# Patient Record
Sex: FEMALE | Race: WHITE | NOT HISPANIC OR LATINO | Employment: OTHER | URBAN - METROPOLITAN AREA
[De-identification: names, ages, dates, MRNs, and addresses within clinical notes are randomized per-mention and may not be internally consistent; named-entity substitution may affect disease eponyms.]

---

## 2021-08-29 ENCOUNTER — HOSPITAL ENCOUNTER (INPATIENT)
Facility: HOSPITAL | Age: 86
LOS: 2 days | Discharge: HOME HEALTH | End: 2021-08-31
Attending: EMERGENCY MEDICINE | Admitting: FAMILY MEDICINE

## 2021-08-29 ENCOUNTER — APPOINTMENT (OUTPATIENT)
Dept: GENERAL RADIOLOGY | Facility: HOSPITAL | Age: 86
End: 2021-08-29

## 2021-08-29 DIAGNOSIS — R09.02 HYPOXIA: ICD-10-CM

## 2021-08-29 DIAGNOSIS — I50.9 ACUTE ON CHRONIC CONGESTIVE HEART FAILURE, UNSPECIFIED HEART FAILURE TYPE (HCC): Primary | ICD-10-CM

## 2021-08-29 PROBLEM — I10 HTN (HYPERTENSION): Status: ACTIVE | Noted: 2021-08-29

## 2021-08-29 PROBLEM — I35.1 AORTIC VALVE REGURGITATION: Status: ACTIVE | Noted: 2018-05-13

## 2021-08-29 PROBLEM — Z95.0 CARDIAC PACEMAKER IN SITU: Status: ACTIVE | Noted: 2021-08-29

## 2021-08-29 LAB
ALBUMIN SERPL-MCNC: 3.8 G/DL (ref 3.5–5.2)
ALBUMIN/GLOB SERPL: 1.2 G/DL
ALP SERPL-CCNC: 98 U/L (ref 39–117)
ALT SERPL W P-5'-P-CCNC: 15 U/L (ref 1–33)
ANION GAP SERPL CALCULATED.3IONS-SCNC: 15 MMOL/L (ref 5–15)
AST SERPL-CCNC: 25 U/L (ref 1–32)
BASOPHILS # BLD AUTO: 0.1 10*3/MM3 (ref 0–0.2)
BASOPHILS NFR BLD AUTO: 0.9 % (ref 0–1.5)
BILIRUB SERPL-MCNC: 0.5 MG/DL (ref 0–1.2)
BUN SERPL-MCNC: 22 MG/DL (ref 8–23)
BUN/CREAT SERPL: 27.5 (ref 7–25)
CALCIUM SPEC-SCNC: 9.6 MG/DL (ref 8.6–10.5)
CHLORIDE SERPL-SCNC: 102 MMOL/L (ref 98–107)
CO2 SERPL-SCNC: 21 MMOL/L (ref 22–29)
CREAT SERPL-MCNC: 0.8 MG/DL (ref 0.57–1)
DEPRECATED RDW RBC AUTO: 57.7 FL (ref 37–54)
EOSINOPHIL # BLD AUTO: 0.03 10*3/MM3 (ref 0–0.4)
EOSINOPHIL NFR BLD AUTO: 0.3 % (ref 0.3–6.2)
ERYTHROCYTE [DISTWIDTH] IN BLOOD BY AUTOMATED COUNT: 19.7 % (ref 12.3–15.4)
FLUAV SUBTYP SPEC NAA+PROBE: NOT DETECTED
FLUBV RNA ISLT QL NAA+PROBE: NOT DETECTED
GFR SERPL CREATININE-BSD FRML MDRD: 68 ML/MIN/1.73
GLOBULIN UR ELPH-MCNC: 3.3 GM/DL
GLUCOSE SERPL-MCNC: 133 MG/DL (ref 65–99)
HCT VFR BLD AUTO: 38.4 % (ref 34–46.6)
HGB BLD-MCNC: 12 G/DL (ref 12–15.9)
HOLD SPECIMEN: NORMAL
IMM GRANULOCYTES # BLD AUTO: 0.07 10*3/MM3 (ref 0–0.05)
IMM GRANULOCYTES NFR BLD AUTO: 0.7 % (ref 0–0.5)
LYMPHOCYTES # BLD AUTO: 1.24 10*3/MM3 (ref 0.7–3.1)
LYMPHOCYTES NFR BLD AUTO: 11.5 % (ref 19.6–45.3)
MAGNESIUM SERPL-MCNC: 2 MG/DL (ref 1.6–2.4)
MCH RBC QN AUTO: 25.6 PG (ref 26.6–33)
MCHC RBC AUTO-ENTMCNC: 31.3 G/DL (ref 31.5–35.7)
MCV RBC AUTO: 82.1 FL (ref 79–97)
MONOCYTES # BLD AUTO: 0.58 10*3/MM3 (ref 0.1–0.9)
MONOCYTES NFR BLD AUTO: 5.4 % (ref 5–12)
NEUTROPHILS NFR BLD AUTO: 8.72 10*3/MM3 (ref 1.7–7)
NEUTROPHILS NFR BLD AUTO: 81.2 % (ref 42.7–76)
NRBC BLD AUTO-RTO: 0 /100 WBC (ref 0–0.2)
NT-PROBNP SERPL-MCNC: ABNORMAL PG/ML (ref 0–1800)
PLATELET # BLD AUTO: 332 10*3/MM3 (ref 140–450)
PMV BLD AUTO: 12.3 FL (ref 6–12)
POTASSIUM SERPL-SCNC: 4.2 MMOL/L (ref 3.5–5.2)
PROT SERPL-MCNC: 7.1 G/DL (ref 6–8.5)
RBC # BLD AUTO: 4.68 10*6/MM3 (ref 3.77–5.28)
SARS-COV-2 RNA PNL SPEC NAA+PROBE: NOT DETECTED
SODIUM SERPL-SCNC: 138 MMOL/L (ref 136–145)
T4 FREE SERPL-MCNC: 1.07 NG/DL (ref 0.93–1.7)
TROPONIN T SERPL-MCNC: 0.02 NG/ML (ref 0–0.03)
TROPONIN T SERPL-MCNC: 0.02 NG/ML (ref 0–0.03)
TSH SERPL DL<=0.05 MIU/L-ACNC: 17.88 UIU/ML (ref 0.27–4.2)
WBC # BLD AUTO: 10.74 10*3/MM3 (ref 3.4–10.8)
WHOLE BLOOD HOLD SPECIMEN: NORMAL
WHOLE BLOOD HOLD SPECIMEN: NORMAL

## 2021-08-29 PROCEDURE — 25010000002 HEPARIN (PORCINE) PER 1000 UNITS: Performed by: NURSE PRACTITIONER

## 2021-08-29 PROCEDURE — 83735 ASSAY OF MAGNESIUM: CPT | Performed by: NURSE PRACTITIONER

## 2021-08-29 PROCEDURE — 85025 COMPLETE CBC W/AUTO DIFF WBC: CPT | Performed by: EMERGENCY MEDICINE

## 2021-08-29 PROCEDURE — 84484 ASSAY OF TROPONIN QUANT: CPT | Performed by: NURSE PRACTITIONER

## 2021-08-29 PROCEDURE — 99223 1ST HOSP IP/OBS HIGH 75: CPT | Performed by: INTERNAL MEDICINE

## 2021-08-29 PROCEDURE — 25010000002 FUROSEMIDE PER 20 MG: Performed by: NURSE PRACTITIONER

## 2021-08-29 PROCEDURE — 25010000002 FUROSEMIDE PER 20 MG: Performed by: EMERGENCY MEDICINE

## 2021-08-29 PROCEDURE — 84484 ASSAY OF TROPONIN QUANT: CPT | Performed by: EMERGENCY MEDICINE

## 2021-08-29 PROCEDURE — 83880 ASSAY OF NATRIURETIC PEPTIDE: CPT | Performed by: EMERGENCY MEDICINE

## 2021-08-29 PROCEDURE — 25010000002 FUROSEMIDE PER 20 MG: Performed by: FAMILY MEDICINE

## 2021-08-29 PROCEDURE — 99284 EMERGENCY DEPT VISIT MOD MDM: CPT

## 2021-08-29 PROCEDURE — 71045 X-RAY EXAM CHEST 1 VIEW: CPT

## 2021-08-29 PROCEDURE — 93005 ELECTROCARDIOGRAM TRACING: CPT | Performed by: EMERGENCY MEDICINE

## 2021-08-29 PROCEDURE — 80053 COMPREHEN METABOLIC PANEL: CPT | Performed by: EMERGENCY MEDICINE

## 2021-08-29 PROCEDURE — 84439 ASSAY OF FREE THYROXINE: CPT | Performed by: FAMILY MEDICINE

## 2021-08-29 PROCEDURE — 84443 ASSAY THYROID STIM HORMONE: CPT | Performed by: NURSE PRACTITIONER

## 2021-08-29 PROCEDURE — 87636 SARSCOV2 & INF A&B AMP PRB: CPT | Performed by: EMERGENCY MEDICINE

## 2021-08-29 RX ORDER — ERGOCALCIFEROL 1.25 MG/1
50000 CAPSULE ORAL WEEKLY
COMMUNITY

## 2021-08-29 RX ORDER — FUROSEMIDE 10 MG/ML
40 INJECTION INTRAMUSCULAR; INTRAVENOUS EVERY 12 HOURS
Status: COMPLETED | OUTPATIENT
Start: 2021-08-29 | End: 2021-08-30

## 2021-08-29 RX ORDER — SODIUM CHLORIDE 0.9 % (FLUSH) 0.9 %
10 SYRINGE (ML) INJECTION EVERY 12 HOURS SCHEDULED
Status: DISCONTINUED | OUTPATIENT
Start: 2021-08-29 | End: 2021-08-31 | Stop reason: HOSPADM

## 2021-08-29 RX ORDER — AMOXICILLIN 250 MG
2 CAPSULE ORAL 2 TIMES DAILY
Status: DISCONTINUED | OUTPATIENT
Start: 2021-08-29 | End: 2021-08-31 | Stop reason: HOSPADM

## 2021-08-29 RX ORDER — POLYETHYLENE GLYCOL 3350 17 G/17G
17 POWDER, FOR SOLUTION ORAL DAILY
Status: DISCONTINUED | OUTPATIENT
Start: 2021-08-29 | End: 2021-08-31 | Stop reason: HOSPADM

## 2021-08-29 RX ORDER — POTASSIUM CHLORIDE 1.5 G/1.77G
40 POWDER, FOR SOLUTION ORAL AS NEEDED
Status: DISCONTINUED | OUTPATIENT
Start: 2021-08-29 | End: 2021-08-31 | Stop reason: HOSPADM

## 2021-08-29 RX ORDER — HEPARIN SODIUM 5000 [USP'U]/ML
5000 INJECTION, SOLUTION INTRAVENOUS; SUBCUTANEOUS EVERY 12 HOURS SCHEDULED
Status: DISCONTINUED | OUTPATIENT
Start: 2021-08-29 | End: 2021-08-31 | Stop reason: HOSPADM

## 2021-08-29 RX ORDER — FUROSEMIDE 10 MG/ML
40 INJECTION INTRAMUSCULAR; INTRAVENOUS ONCE
Status: COMPLETED | OUTPATIENT
Start: 2021-08-29 | End: 2021-08-29

## 2021-08-29 RX ORDER — AMLODIPINE BESYLATE 10 MG/1
10 TABLET ORAL DAILY
COMMUNITY
End: 2021-08-31 | Stop reason: HOSPADM

## 2021-08-29 RX ORDER — SODIUM CHLORIDE 0.9 % (FLUSH) 0.9 %
10 SYRINGE (ML) INJECTION AS NEEDED
Status: DISCONTINUED | OUTPATIENT
Start: 2021-08-29 | End: 2021-08-31 | Stop reason: HOSPADM

## 2021-08-29 RX ORDER — FUROSEMIDE 10 MG/ML
40 INJECTION INTRAMUSCULAR; INTRAVENOUS DAILY
Status: COMPLETED | OUTPATIENT
Start: 2021-08-29 | End: 2021-08-29

## 2021-08-29 RX ORDER — MAGNESIUM SULFATE HEPTAHYDRATE 40 MG/ML
4 INJECTION, SOLUTION INTRAVENOUS AS NEEDED
Status: DISCONTINUED | OUTPATIENT
Start: 2021-08-29 | End: 2021-08-31 | Stop reason: HOSPADM

## 2021-08-29 RX ORDER — FLUTICASONE PROPIONATE 50 MCG
2 SPRAY, SUSPENSION (ML) NASAL DAILY
COMMUNITY
End: 2021-10-14

## 2021-08-29 RX ORDER — MAGNESIUM SULFATE HEPTAHYDRATE 40 MG/ML
2 INJECTION, SOLUTION INTRAVENOUS AS NEEDED
Status: DISCONTINUED | OUTPATIENT
Start: 2021-08-29 | End: 2021-08-31 | Stop reason: HOSPADM

## 2021-08-29 RX ORDER — LEVOTHYROXINE SODIUM 88 UG/1
88 TABLET ORAL
Status: DISCONTINUED | OUTPATIENT
Start: 2021-08-29 | End: 2021-08-31 | Stop reason: HOSPADM

## 2021-08-29 RX ORDER — ROSUVASTATIN CALCIUM 10 MG/1
10 TABLET, COATED ORAL DAILY
COMMUNITY

## 2021-08-29 RX ORDER — NITROGLYCERIN 20 MG/100ML
5-50 INJECTION INTRAVENOUS
Status: DISCONTINUED | OUTPATIENT
Start: 2021-08-29 | End: 2021-08-31 | Stop reason: HOSPADM

## 2021-08-29 RX ORDER — ROSUVASTATIN CALCIUM 10 MG/1
10 TABLET, COATED ORAL NIGHTLY
Status: DISCONTINUED | OUTPATIENT
Start: 2021-08-29 | End: 2021-08-31 | Stop reason: HOSPADM

## 2021-08-29 RX ORDER — POTASSIUM CHLORIDE 7.45 MG/ML
10 INJECTION INTRAVENOUS
Status: DISCONTINUED | OUTPATIENT
Start: 2021-08-29 | End: 2021-08-31 | Stop reason: HOSPADM

## 2021-08-29 RX ORDER — POTASSIUM CHLORIDE 750 MG/1
40 CAPSULE, EXTENDED RELEASE ORAL AS NEEDED
Status: DISCONTINUED | OUTPATIENT
Start: 2021-08-29 | End: 2021-08-31 | Stop reason: HOSPADM

## 2021-08-29 RX ORDER — LEVOTHYROXINE SODIUM 88 UG/1
88 TABLET ORAL DAILY
COMMUNITY

## 2021-08-29 RX ADMIN — FUROSEMIDE 40 MG: 10 INJECTION, SOLUTION INTRAMUSCULAR; INTRAVENOUS at 07:53

## 2021-08-29 RX ADMIN — DOCUSATE SODIUM 50 MG AND SENNOSIDES 8.6 MG 2 TABLET: 8.6; 5 TABLET, FILM COATED ORAL at 18:55

## 2021-08-29 RX ADMIN — SODIUM CHLORIDE, PRESERVATIVE FREE 10 ML: 5 INJECTION INTRAVENOUS at 07:54

## 2021-08-29 RX ADMIN — SODIUM CHLORIDE, PRESERVATIVE FREE 10 ML: 5 INJECTION INTRAVENOUS at 20:11

## 2021-08-29 RX ADMIN — HEPARIN SODIUM 5000 UNITS: 5000 INJECTION, SOLUTION INTRAVENOUS; SUBCUTANEOUS at 07:54

## 2021-08-29 RX ADMIN — HEPARIN SODIUM 5000 UNITS: 5000 INJECTION, SOLUTION INTRAVENOUS; SUBCUTANEOUS at 20:11

## 2021-08-29 RX ADMIN — POLYETHYLENE GLYCOL 3350 17 G: 17 POWDER, FOR SOLUTION ORAL at 18:55

## 2021-08-29 RX ADMIN — FUROSEMIDE 40 MG: 10 INJECTION, SOLUTION INTRAMUSCULAR; INTRAVENOUS at 17:19

## 2021-08-29 RX ADMIN — FUROSEMIDE 40 MG: 10 INJECTION, SOLUTION INTRAMUSCULAR; INTRAVENOUS at 03:45

## 2021-08-29 RX ADMIN — LEVOTHYROXINE SODIUM 88 MCG: 88 TABLET ORAL at 15:30

## 2021-08-29 RX ADMIN — ROSUVASTATIN CALCIUM 10 MG: 10 TABLET, COATED ORAL at 20:11

## 2021-08-30 ENCOUNTER — APPOINTMENT (OUTPATIENT)
Dept: CARDIOLOGY | Facility: HOSPITAL | Age: 86
End: 2021-08-30

## 2021-08-30 PROBLEM — I50.43 ACUTE ON CHRONIC COMBINED SYSTOLIC AND DIASTOLIC CONGESTIVE HEART FAILURE: Status: ACTIVE | Noted: 2021-08-29

## 2021-08-30 LAB
ANION GAP SERPL CALCULATED.3IONS-SCNC: 9 MMOL/L (ref 5–15)
ASCENDING AORTA: 2.9 CM
AV AREA-PISA: 0.43 CM2
AV RADIUS PISA: 1 CM
AV REGURGITANT VOLUME: 57 CC
AV VENA CONTRACTA: 0.85 CM
BH CV ECHO MEAS - AI DEC SLOPE: 747.7 CM/SEC^2
BH CV ECHO MEAS - AI MAX PG: 80.3 MMHG
BH CV ECHO MEAS - AI MAX VEL: 448 CM/SEC
BH CV ECHO MEAS - AI P1/2T: 175.5 MSEC
BH CV ECHO MEAS - AO MAX PG (FULL): 4.1 MMHG
BH CV ECHO MEAS - AO MAX PG: 7 MMHG
BH CV ECHO MEAS - AO MEAN PG (FULL): 3 MMHG
BH CV ECHO MEAS - AO MEAN PG: 4 MMHG
BH CV ECHO MEAS - AO ROOT AREA (BSA CORRECTED): 2.3
BH CV ECHO MEAS - AO ROOT AREA: 7.1 CM^2
BH CV ECHO MEAS - AO ROOT DIAM: 3 CM
BH CV ECHO MEAS - AO V2 MAX: 131 CM/SEC
BH CV ECHO MEAS - AO V2 MEAN: 88.5 CM/SEC
BH CV ECHO MEAS - AO V2 VTI: 24.2 CM
BH CV ECHO MEAS - ASC AORTA: 2.9 CM
BH CV ECHO MEAS - AVA(I,A): 1.8 CM^2
BH CV ECHO MEAS - AVA(I,D): 1.8 CM^2
BH CV ECHO MEAS - AVA(V,A): 2 CM^2
BH CV ECHO MEAS - AVA(V,D): 2 CM^2
BH CV ECHO MEAS - BSA(HAYCOCK): 1.3 M^2
BH CV ECHO MEAS - BSA: 1.3 M^2
BH CV ECHO MEAS - BZI_BMI: 17 KILOGRAMS/M^2
BH CV ECHO MEAS - BZI_METRIC_HEIGHT: 152.4 CM
BH CV ECHO MEAS - BZI_METRIC_WEIGHT: 39.5 KG
BH CV ECHO MEAS - EDV(CUBED): 117.6 ML
BH CV ECHO MEAS - EDV(MOD-SP2): 113 ML
BH CV ECHO MEAS - EDV(MOD-SP4): 91.6 ML
BH CV ECHO MEAS - EDV(TEICH): 112.8 ML
BH CV ECHO MEAS - EF(CUBED): 49.6 %
BH CV ECHO MEAS - EF(MOD-BP): 34.8 %
BH CV ECHO MEAS - EF(MOD-SP2): 35.3 %
BH CV ECHO MEAS - EF(MOD-SP4): 31.2 %
BH CV ECHO MEAS - EF(TEICH): 41.6 %
BH CV ECHO MEAS - ESV(CUBED): 59.3 ML
BH CV ECHO MEAS - ESV(MOD-SP2): 73.1 ML
BH CV ECHO MEAS - ESV(MOD-SP4): 63 ML
BH CV ECHO MEAS - ESV(TEICH): 65.9 ML
BH CV ECHO MEAS - FS: 20.4 %
BH CV ECHO MEAS - IVS/LVPW: 0.83
BH CV ECHO MEAS - IVSD: 1 CM
BH CV ECHO MEAS - LA DIMENSION: 2.8 CM
BH CV ECHO MEAS - LA/AO: 0.93
BH CV ECHO MEAS - LAD MAJOR: 5.5 CM
BH CV ECHO MEAS - LAT PEAK E' VEL: 6.7 CM/SEC
BH CV ECHO MEAS - LATERAL E/E' RATIO: 15
BH CV ECHO MEAS - LV DIASTOLIC VOL/BSA (35-75): 69.9 ML/M^2
BH CV ECHO MEAS - LV IVRT: 0.1 SEC
BH CV ECHO MEAS - LV MASS(C)D: 200.5 GRAMS
BH CV ECHO MEAS - LV MASS(C)DI: 153 GRAMS/M^2
BH CV ECHO MEAS - LV MAX PG: 2.9 MMHG
BH CV ECHO MEAS - LV MEAN PG: 1 MMHG
BH CV ECHO MEAS - LV SYSTOLIC VOL/BSA (12-30): 48.1 ML/M^2
BH CV ECHO MEAS - LV V1 MAX: 84.6 CM/SEC
BH CV ECHO MEAS - LV V1 MEAN: 50.1 CM/SEC
BH CV ECHO MEAS - LV V1 VTI: 13.7 CM
BH CV ECHO MEAS - LVIDD: 4.9 CM
BH CV ECHO MEAS - LVIDS: 3.9 CM
BH CV ECHO MEAS - LVLD AP2: 7.8 CM
BH CV ECHO MEAS - LVLD AP4: 7 CM
BH CV ECHO MEAS - LVLS AP2: 7.2 CM
BH CV ECHO MEAS - LVLS AP4: 6.8 CM
BH CV ECHO MEAS - LVOT AREA (M): 3.1 CM^2
BH CV ECHO MEAS - LVOT AREA: 3.1 CM^2
BH CV ECHO MEAS - LVOT DIAM: 2 CM
BH CV ECHO MEAS - LVPWD: 1.2 CM
BH CV ECHO MEAS - MED PEAK E' VEL: 5.6 CM/SEC
BH CV ECHO MEAS - MEDIAL E/E' RATIO: 17.8
BH CV ECHO MEAS - MR ALIAS VEL: 30.8 CM/SEC
BH CV ECHO MEAS - MR ERO: 0.13 CM^2
BH CV ECHO MEAS - MR FLOW RATE: 69.7 CM^3/SEC
BH CV ECHO MEAS - MR MAX PG: 110 MMHG
BH CV ECHO MEAS - MR MAX VEL: 524 CM/SEC
BH CV ECHO MEAS - MR MEAN PG: 71 MMHG
BH CV ECHO MEAS - MR MEAN VEL: 393 CM/SEC
BH CV ECHO MEAS - MR PISA RADIUS: 0.6 CM
BH CV ECHO MEAS - MR PISA: 2.3 CM^2
BH CV ECHO MEAS - MR VOLUME: 22.1 ML
BH CV ECHO MEAS - MR VTI: 166 CM
BH CV ECHO MEAS - MV A MAX VEL: 72.8 CM/SEC
BH CV ECHO MEAS - MV AREA (1 DIAM): 4.9 CM^2
BH CV ECHO MEAS - MV DEC SLOPE: 499.5 CM/SEC^2
BH CV ECHO MEAS - MV DEC TIME: 0.21 SEC
BH CV ECHO MEAS - MV DIAM: 2.5 CM
BH CV ECHO MEAS - MV E MAX VEL: 100 CM/SEC
BH CV ECHO MEAS - MV E/A: 1.4
BH CV ECHO MEAS - MV FLOW AREA(1DIAM): 4.9 CM^2
BH CV ECHO MEAS - MV MAX PG: 3.2 MMHG
BH CV ECHO MEAS - MV MEAN PG: 2 MMHG
BH CV ECHO MEAS - MV P1/2T MAX VEL: 93.1 CM/SEC
BH CV ECHO MEAS - MV P1/2T: 54.6 MSEC
BH CV ECHO MEAS - MV V2 MAX: 89.1 CM/SEC
BH CV ECHO MEAS - MV V2 MEAN: 73.1 CM/SEC
BH CV ECHO MEAS - MV V2 VTI: 16.1 CM
BH CV ECHO MEAS - MVA P1/2T LCG: 2.4 CM^2
BH CV ECHO MEAS - MVA(P1/2T): 4 CM^2
BH CV ECHO MEAS - MVA(VTI): 2.7 CM^2
BH CV ECHO MEAS - PA ACC TIME: 0.16 SEC
BH CV ECHO MEAS - PA MAX PG: 4.6 MMHG
BH CV ECHO MEAS - PA PR(ACCEL): 6.1 MMHG
BH CV ECHO MEAS - PA V2 MAX: 107 CM/SEC
BH CV ECHO MEAS - PI END-D VEL: 130 CM/SEC
BH CV ECHO MEAS - RAP SYSTOLE: 3 MMHG
BH CV ECHO MEAS - RF(MV,AO)(1 DIAM): -1.2
BH CV ECHO MEAS - RF(MV,LVOT)(1DIAM): 0.46
BH CV ECHO MEAS - RVSP: 25 MMHG
BH CV ECHO MEAS - SI(AO): 130.5 ML/M^2
BH CV ECHO MEAS - SI(CUBED): 44.5 ML/M^2
BH CV ECHO MEAS - SI(LVOT): 32.8 ML/M^2
BH CV ECHO MEAS - SI(MOD-SP2): 30.4 ML/M^2
BH CV ECHO MEAS - SI(MOD-SP4): 21.8 ML/M^2
BH CV ECHO MEAS - SI(MV 1 DIAM): 60.3 ML/M^2
BH CV ECHO MEAS - SI(TEICH): 35.8 ML/M^2
BH CV ECHO MEAS - SV(AO): 171.1 ML
BH CV ECHO MEAS - SV(CUBED): 58.3 ML
BH CV ECHO MEAS - SV(LVOT): 43 ML
BH CV ECHO MEAS - SV(MOD-SP2): 39.9 ML
BH CV ECHO MEAS - SV(MOD-SP4): 28.6 ML
BH CV ECHO MEAS - SV(MV 1 DIAM): 79 ML
BH CV ECHO MEAS - SV(TEICH): 46.9 ML
BH CV ECHO MEAS - TAPSE (>1.6): 1.6 CM
BH CV ECHO MEAS - TR MAX PG: 22 MMHG
BH CV ECHO MEAS - TR MAX VEL: 236 CM/SEC
BH CV ECHO MEASUREMENTS AVERAGE E/E' RATIO: 16.26
BH CV FAKE - AV AREA-PISA: 0.43 CM2
BH CV VAS BP LEFT ARM: NORMAL MMHG
BH CV XLRA - RV BASE: 3.2 CM
BH CV XLRA - RV LENGTH: 5 CM
BH CV XLRA - RV MID: 2.1 CM
BH CV XLRA - TDI S': 6.5 CM/SEC
BUN SERPL-MCNC: 27 MG/DL (ref 8–23)
BUN/CREAT SERPL: 27.8 (ref 7–25)
CALCIUM SPEC-SCNC: 8.5 MG/DL (ref 8.6–10.5)
CHLORIDE SERPL-SCNC: 102 MMOL/L (ref 98–107)
CO2 SERPL-SCNC: 29 MMOL/L (ref 22–29)
CREAT SERPL-MCNC: 0.97 MG/DL (ref 0.57–1)
GFR SERPL CREATININE-BSD FRML MDRD: 54 ML/MIN/1.73
GLUCOSE SERPL-MCNC: 110 MG/DL (ref 65–99)
IVRT: 95 MSEC
LEFT ATRIUM VOLUME INDEX: 41.2 ML/M^2
LEFT ATRIUM VOLUME: 54 ML
LV EF 2D ECHO EST: 30 %
MR PISA EROA: 0.13 CM2
MV REGURGITANT FRACTION: 28 %
MV REGURGITANT VOLUME: 22 CC
MV VENA CONTRACTA: 0.57 CM
PISA ALIASING VEL: 0.3 M/S
PISA AR ALIASING VEL: 30.8 M/S
PISA AR MAX VEL: 450 M/S
PISA RADIUS: 0.6 CM
POTASSIUM SERPL-SCNC: 3 MMOL/L (ref 3.5–5.2)
POTASSIUM SERPL-SCNC: 4.8 MMOL/L (ref 3.5–5.2)
SODIUM SERPL-SCNC: 140 MMOL/L (ref 136–145)

## 2021-08-30 PROCEDURE — 80048 BASIC METABOLIC PNL TOTAL CA: CPT | Performed by: FAMILY MEDICINE

## 2021-08-30 PROCEDURE — 93306 TTE W/DOPPLER COMPLETE: CPT

## 2021-08-30 PROCEDURE — 84132 ASSAY OF SERUM POTASSIUM: CPT | Performed by: FAMILY MEDICINE

## 2021-08-30 PROCEDURE — 25010000002 HEPARIN (PORCINE) PER 1000 UNITS: Performed by: NURSE PRACTITIONER

## 2021-08-30 PROCEDURE — 25010000002 FUROSEMIDE PER 20 MG: Performed by: FAMILY MEDICINE

## 2021-08-30 PROCEDURE — 93306 TTE W/DOPPLER COMPLETE: CPT | Performed by: INTERNAL MEDICINE

## 2021-08-30 PROCEDURE — 99233 SBSQ HOSP IP/OBS HIGH 50: CPT | Performed by: FAMILY MEDICINE

## 2021-08-30 RX ORDER — SPIRONOLACTONE 25 MG/1
50 TABLET ORAL DAILY
Status: DISCONTINUED | OUTPATIENT
Start: 2021-08-30 | End: 2021-08-31 | Stop reason: HOSPADM

## 2021-08-30 RX ORDER — IPRATROPIUM BROMIDE AND ALBUTEROL SULFATE 2.5; .5 MG/3ML; MG/3ML
3 SOLUTION RESPIRATORY (INHALATION) EVERY 4 HOURS PRN
Status: DISCONTINUED | OUTPATIENT
Start: 2021-08-30 | End: 2021-08-31 | Stop reason: HOSPADM

## 2021-08-30 RX ADMIN — HEPARIN SODIUM 5000 UNITS: 5000 INJECTION, SOLUTION INTRAVENOUS; SUBCUTANEOUS at 20:23

## 2021-08-30 RX ADMIN — POTASSIUM CHLORIDE 40 MEQ: 750 CAPSULE, EXTENDED RELEASE ORAL at 10:22

## 2021-08-30 RX ADMIN — HEPARIN SODIUM 5000 UNITS: 5000 INJECTION, SOLUTION INTRAVENOUS; SUBCUTANEOUS at 08:22

## 2021-08-30 RX ADMIN — POLYETHYLENE GLYCOL 3350 17 G: 17 POWDER, FOR SOLUTION ORAL at 08:22

## 2021-08-30 RX ADMIN — POTASSIUM CHLORIDE 40 MEQ: 750 CAPSULE, EXTENDED RELEASE ORAL at 06:31

## 2021-08-30 RX ADMIN — ROSUVASTATIN CALCIUM 10 MG: 10 TABLET, COATED ORAL at 20:23

## 2021-08-30 RX ADMIN — FUROSEMIDE 40 MG: 10 INJECTION, SOLUTION INTRAMUSCULAR; INTRAVENOUS at 19:00

## 2021-08-30 RX ADMIN — SODIUM CHLORIDE, PRESERVATIVE FREE 10 ML: 5 INJECTION INTRAVENOUS at 08:33

## 2021-08-30 RX ADMIN — POTASSIUM CHLORIDE 40 MEQ: 750 CAPSULE, EXTENDED RELEASE ORAL at 14:39

## 2021-08-30 RX ADMIN — SPIRONOLACTONE 50 MG: 25 TABLET ORAL at 12:29

## 2021-08-30 RX ADMIN — SODIUM CHLORIDE, PRESERVATIVE FREE 10 ML: 5 INJECTION INTRAVENOUS at 20:23

## 2021-08-30 RX ADMIN — DOCUSATE SODIUM 50 MG AND SENNOSIDES 8.6 MG 2 TABLET: 8.6; 5 TABLET, FILM COATED ORAL at 08:22

## 2021-08-30 RX ADMIN — FUROSEMIDE 40 MG: 10 INJECTION, SOLUTION INTRAMUSCULAR; INTRAVENOUS at 04:58

## 2021-08-30 RX ADMIN — LEVOTHYROXINE SODIUM 88 MCG: 88 TABLET ORAL at 04:58

## 2021-08-31 VITALS
TEMPERATURE: 98.1 F | DIASTOLIC BLOOD PRESSURE: 47 MMHG | SYSTOLIC BLOOD PRESSURE: 118 MMHG | RESPIRATION RATE: 16 BRPM | WEIGHT: 83.7 LBS | HEIGHT: 60 IN | OXYGEN SATURATION: 92 % | HEART RATE: 82 BPM | BODY MASS INDEX: 16.43 KG/M2

## 2021-08-31 PROBLEM — I50.43 ACUTE ON CHRONIC COMBINED SYSTOLIC AND DIASTOLIC CONGESTIVE HEART FAILURE (HCC): Status: RESOLVED | Noted: 2021-08-29 | Resolved: 2021-08-31

## 2021-08-31 PROCEDURE — 25010000002 HEPARIN (PORCINE) PER 1000 UNITS: Performed by: NURSE PRACTITIONER

## 2021-08-31 PROCEDURE — 99239 HOSP IP/OBS DSCHRG MGMT >30: CPT | Performed by: FAMILY MEDICINE

## 2021-08-31 PROCEDURE — 97161 PT EVAL LOW COMPLEX 20 MIN: CPT

## 2021-08-31 RX ORDER — METOPROLOL SUCCINATE 25 MG/1
25 TABLET, EXTENDED RELEASE ORAL DAILY
Qty: 30 TABLET | Refills: 1 | Status: SHIPPED | OUTPATIENT
Start: 2021-08-31

## 2021-08-31 RX ORDER — SPIRONOLACTONE 50 MG/1
50 TABLET, FILM COATED ORAL DAILY
Qty: 30 TABLET | Refills: 1 | Status: SHIPPED | OUTPATIENT
Start: 2021-08-31 | End: 2021-12-07 | Stop reason: SDDI

## 2021-08-31 RX ADMIN — SODIUM CHLORIDE, PRESERVATIVE FREE 10 ML: 5 INJECTION INTRAVENOUS at 09:01

## 2021-08-31 RX ADMIN — LEVOTHYROXINE SODIUM 88 MCG: 88 TABLET ORAL at 06:20

## 2021-08-31 RX ADMIN — HEPARIN SODIUM 5000 UNITS: 5000 INJECTION, SOLUTION INTRAVENOUS; SUBCUTANEOUS at 09:01

## 2021-08-31 RX ADMIN — SPIRONOLACTONE 50 MG: 25 TABLET ORAL at 09:01

## 2021-08-31 RX ADMIN — METOPROLOL TARTRATE 12.5 MG: 25 TABLET, FILM COATED ORAL at 09:01

## 2021-09-01 ENCOUNTER — DOCUMENTATION (OUTPATIENT)
Dept: CARDIAC REHAB | Facility: HOSPITAL | Age: 86
End: 2021-09-01

## 2021-09-03 ENCOUNTER — DOCUMENTATION (OUTPATIENT)
Dept: CARDIAC REHAB | Facility: HOSPITAL | Age: 86
End: 2021-09-03

## 2021-09-03 LAB
QT INTERVAL: 392 MS
QTC INTERVAL: 518 MS

## 2021-09-07 ENCOUNTER — OFFICE VISIT (OUTPATIENT)
Dept: CARDIOLOGY | Facility: HOSPITAL | Age: 86
End: 2021-09-07

## 2021-09-07 ENCOUNTER — LAB (OUTPATIENT)
Dept: LAB | Facility: HOSPITAL | Age: 86
End: 2021-09-07

## 2021-09-07 VITALS
SYSTOLIC BLOOD PRESSURE: 131 MMHG | WEIGHT: 89 LBS | TEMPERATURE: 97.4 F | OXYGEN SATURATION: 98 % | RESPIRATION RATE: 20 BRPM | BODY MASS INDEX: 17.47 KG/M2 | HEART RATE: 104 BPM | DIASTOLIC BLOOD PRESSURE: 60 MMHG | HEIGHT: 60 IN

## 2021-09-07 DIAGNOSIS — I34.0 NONRHEUMATIC MITRAL VALVE REGURGITATION: ICD-10-CM

## 2021-09-07 DIAGNOSIS — I50.20 HFREF (HEART FAILURE WITH REDUCED EJECTION FRACTION) (HCC): ICD-10-CM

## 2021-09-07 DIAGNOSIS — I50.20 HFREF (HEART FAILURE WITH REDUCED EJECTION FRACTION) (HCC): Primary | ICD-10-CM

## 2021-09-07 DIAGNOSIS — I35.1 AORTIC VALVE INSUFFICIENCY, ETIOLOGY OF CARDIAC VALVE DISEASE UNSPECIFIED: ICD-10-CM

## 2021-09-07 DIAGNOSIS — R94.31 ABNORMAL ELECTROCARDIOGRAM (ECG) (EKG): ICD-10-CM

## 2021-09-07 DIAGNOSIS — I10 ESSENTIAL HYPERTENSION: ICD-10-CM

## 2021-09-07 LAB
ANION GAP SERPL CALCULATED.3IONS-SCNC: 9 MMOL/L (ref 5–15)
BUN SERPL-MCNC: 17 MG/DL (ref 8–23)
BUN/CREAT SERPL: 19.5 (ref 7–25)
CALCIUM SPEC-SCNC: 9.5 MG/DL (ref 8.6–10.5)
CHLORIDE SERPL-SCNC: 107 MMOL/L (ref 98–107)
CO2 SERPL-SCNC: 27 MMOL/L (ref 22–29)
CREAT SERPL-MCNC: 0.87 MG/DL (ref 0.57–1)
GFR SERPL CREATININE-BSD FRML MDRD: 61 ML/MIN/1.73
GLUCOSE SERPL-MCNC: 93 MG/DL (ref 65–99)
POTASSIUM SERPL-SCNC: 4 MMOL/L (ref 3.5–5.2)
SODIUM SERPL-SCNC: 143 MMOL/L (ref 136–145)

## 2021-09-07 PROCEDURE — 80048 BASIC METABOLIC PNL TOTAL CA: CPT

## 2021-09-07 PROCEDURE — 99204 OFFICE O/P NEW MOD 45 MIN: CPT | Performed by: NURSE PRACTITIONER

## 2021-09-07 PROCEDURE — 36415 COLL VENOUS BLD VENIPUNCTURE: CPT

## 2021-09-07 RX ORDER — LISINOPRIL 2.5 MG/1
2.5 TABLET ORAL DAILY
Qty: 30 TABLET | Refills: 11 | Status: SHIPPED | OUTPATIENT
Start: 2021-09-07 | End: 2021-12-07 | Stop reason: ALTCHOICE

## 2021-09-07 NOTE — PATIENT INSTRUCTIONS
- Lab work on 7th floor    - Office will call to schedule testing    - Office will call or send message with testing results    - Cardiology will call to schedule appointment

## 2021-09-07 NOTE — PROGRESS NOTES
"Chief Complaint  Congestive Heart Failure and Establish Care    Subjective    History of Present Illness {CC  Problem List  Visit  Diagnosis   Encounters  Notes  Medications  Labs  Result Review Imaging  Media :23}     Laureen Nettles, 89 y.o. female with HTN, HLD, dual-chamber PPM, aortic valve regurgitation, hypothyroidism, and newly diagnosed HFrEF presents to [facility] and Valve clinic for Congestive Heart Failure and Establish Care.    Patient recently hospitalized to [facility] from 8/29/2021-8/31/2021 for complaints of shortness of breath.  Patient was diuresed with IV furosemide; TTE revealed LVEF equals 30-35%, moderate to severe LV systolic dysfunction.  Severe aortic valve regurgitation present, moderate to severe mitral valve regurgitation present.  Patient with newly diagnosed HFrEF diagnosed during recent hospitalization. Hospital notes indicate patient with no desire for prolonged aggressive interventions given her age.    Patient presents today feeling well overall, and improved since recent hospitalization.  Patient limited historian, POA present during clinic visit and assisted with history and details.  Patient states that her dyspnea has improved since hospital discharge.  Patient denies dyspnea, chest pain/pressure, heart racing, and syncope.  Patient does not recall any previous cardiac diagnoses.  Does not recall any previous cardiac testing.  Current primary cardiologist.      Objective     Vital Signs:   Vitals:    09/07/21 1010 09/07/21 1013 09/07/21 1015   BP: 140/63 128/58 131/60   BP Location: Right arm Left arm Left arm   Patient Position: Sitting Standing Sitting   Cuff Size: Adult Adult Adult   Pulse: 92 101 104   Resp:   20   Temp:   97.4 °F (36.3 °C)   TempSrc:   Temporal   SpO2: 97% 98% 98%   Weight:   40.4 kg (89 lb)   Height:   152.4 cm (60\")     Body mass index is 17.38 kg/m².  Physical Exam  Vitals and nursing note reviewed.   Constitutional:     "   Appearance: Normal appearance.   HENT:      Head: Normocephalic.   Eyes:      Extraocular Movements: Extraocular movements intact.   Neck:      Vascular: No carotid bruit.   Cardiovascular:      Rate and Rhythm: Normal rate and regular rhythm.      Pulses: Normal pulses.      Heart sounds: Normal heart sounds, S1 normal and S2 normal. No murmur heard.     Pulmonary:      Effort: Pulmonary effort is normal. No respiratory distress.      Breath sounds: Normal breath sounds.   Musculoskeletal:      Cervical back: Neck supple.      Right lower leg: No edema.      Left lower leg: No edema.   Skin:     General: Skin is warm and dry.   Neurological:      General: No focal deficit present.      Mental Status: She is alert.   Psychiatric:         Mood and Affect: Mood normal.         Behavior: Behavior normal.         Thought Content: Thought content normal.        Data Reviewed:{ Labs  Result Review  Imaging  Med Tab  Media :23}     Potassium (08/30/2021 17:36)  Basic Metabolic Panel (08/30/2021 03:32)  BNP (08/29/2021 01:29)  CBC & Differential (08/29/2021 01:29)  Adult Transthoracic Echo Complete W/ Cont if Necessary Per Protocol (08/30/2021 10:21)  ECG 12 Lead (08/29/2021 02:04)      Assessment and Plan {CC Problem List  Visit Diagnosis  ROS  Review (Popup)  Health Maintenance  Quality  BestPractice  Medications  SmartSets  SnapShot Encounters  Media :23}     1. HFrEF (heart failure with reduced ejection fraction) (CMS/[facility])  -Newly diagnosed HFrEF; TTE during recent hospitalization with LVEF 30-35%, moderate to severe LV systolic dysfunction.  -Appears euvolemic on exam; pulmonary exam with no evidence of pulmonary edema  -Given newly diagnosed HFrEF will pursue ischemic evaluation with myocardial perfusion study.  -Continue metoprolol, spironolactone as prescribed. Entresto a consideration in future; will initiate low-dose lisinopril and monitor response given patient's age and unknown home blood  pressures.  - Ambulatory Referral to Cardiology  - Stress Test With Myocardial Perfusion (1 Day); Future  - lisinopril (PRINIVIL,ZESTRIL) 2.5 MG tablet; Take 1 tablet by mouth Daily.  Dispense: 30 tablet; Refill: 11  - Basic Metabolic Panel; Future  -Discussed heart failure symptoms, and when to seek emergent medical care.    2.  Aortic valve regurgitation  -Severe aortic valve regurgitation noted on TTE 8/30/2021  -Denies syncopal episodes  - Ambulatory Referral to Cardiology    3.  Mitral valve regurgitation  -Moderate to severe mitral valve regurgitation noted on TTE 8/30/2021  -Referral to cardiology as above    4. Essential hypertension  -Controlled at time of visit  -Continue with metoprolol, spironolactone as prescribed  - lisinopril (PRINIVIL,ZESTRIL) 2.5 MG tablet; Take 1 tablet by mouth Daily.  Dispense: 30 tablet; Refill: 11    5. Abnormal electrocardiogram (ECG) (EKG)   -In setting of newly diagnosed HFrEF will pursue ischemic evaluation as above  - Stress Test With Myocardial Perfusion (1 Day); Future        Follow Up {Instructions Charge Capture  Follow-up Communications :23}     Return in about 4 weeks (around 10/5/2021) for Office follow-up.  Heart/fluid check .    Patient was given instructions and counseling regarding her condition or for health maintenance advice. Please see specific information pulled into the AVS if appropriate.  Patient was instructed to call the Heart [facility] Valve Center with any questions, concerns, or worsening symptoms.    *Please note that portions of this note were completed with a voice recognition program. Efforts were made to edit the dictations, but occasionally words are mistranscribed.

## 2021-09-21 ENCOUNTER — HOSPITAL ENCOUNTER (OUTPATIENT)
Dept: CARDIOLOGY | Facility: HOSPITAL | Age: 86
Discharge: HOME | End: 2021-09-21
Admitting: NURSE PRACTITIONER

## 2021-09-21 VITALS
DIASTOLIC BLOOD PRESSURE: 80 MMHG | BODY MASS INDEX: 17.47 KG/M2 | SYSTOLIC BLOOD PRESSURE: 120 MMHG | HEIGHT: 60 IN | WEIGHT: 89 LBS | HEART RATE: 100 BPM

## 2021-09-21 DIAGNOSIS — R94.31 ABNORMAL ELECTROCARDIOGRAM (ECG) (EKG): ICD-10-CM

## 2021-09-21 DIAGNOSIS — I50.20 HFREF (HEART FAILURE WITH REDUCED EJECTION FRACTION) (HCC): ICD-10-CM

## 2021-09-21 LAB
BH CV REST NUCLEAR ISOTOPE DOSE: 9.2 MCI
BH CV STRESS BP STAGE 1: NORMAL
BH CV STRESS BP STAGE 3: NORMAL
BH CV STRESS BP STAGE 4: NORMAL
BH CV STRESS COMMENTS STAGE 1: NORMAL
BH CV STRESS DOSE REGADENOSON STAGE 1: 0.4
BH CV STRESS DURATION MIN STAGE 1: 1
BH CV STRESS DURATION MIN STAGE 2: 1
BH CV STRESS DURATION MIN STAGE 3: 1
BH CV STRESS DURATION MIN STAGE 4: 1
BH CV STRESS DURATION SEC STAGE 1: 0
BH CV STRESS DURATION SEC STAGE 2: 0
BH CV STRESS DURATION SEC STAGE 3: 0
BH CV STRESS DURATION SEC STAGE 4: 0
BH CV STRESS HR STAGE 1: 108
BH CV STRESS HR STAGE 2: 110
BH CV STRESS HR STAGE 3: 111
BH CV STRESS HR STAGE 4: 112
BH CV STRESS NUCLEAR ISOTOPE DOSE: 33 MCI
BH CV STRESS O2 STAGE 1: 99
BH CV STRESS O2 STAGE 2: 97
BH CV STRESS O2 STAGE 3: 100
BH CV STRESS O2 STAGE 4: 99
BH CV STRESS PROTOCOL 1: NORMAL
BH CV STRESS RECOVERY BP: NORMAL MMHG
BH CV STRESS RECOVERY HR: 107 BPM
BH CV STRESS RECOVERY O2: 98 %
BH CV STRESS STAGE 1: 1
BH CV STRESS STAGE 2: 2
BH CV STRESS STAGE 3: 3
BH CV STRESS STAGE 4: 4
LV EF NUC BP: 36 %
MAXIMAL PREDICTED HEART RATE: 131 BPM
PERCENT MAX PREDICTED HR: 87.02 %
STRESS BASELINE BP: NORMAL MMHG
STRESS BASELINE HR: 100 BPM
STRESS O2 SAT REST: 96 %
STRESS PERCENT HR: 102 %
STRESS POST ESTIMATED WORKLOAD: 1 METS
STRESS POST EXERCISE DUR MIN: 4 MIN
STRESS POST EXERCISE DUR SEC: 0 SEC
STRESS POST O2 SAT PEAK: 100 %
STRESS POST PEAK BP: NORMAL MMHG
STRESS POST PEAK HR: 114 BPM
STRESS TARGET HR: 111 BPM

## 2021-09-21 PROCEDURE — 93018 CV STRESS TEST I&R ONLY: CPT | Performed by: INTERNAL MEDICINE

## 2021-09-21 PROCEDURE — 25010000002 REGADENOSON 0.4 MG/5ML SOLUTION: Performed by: NURSE PRACTITIONER

## 2021-09-21 PROCEDURE — 78452 HT MUSCLE IMAGE SPECT MULT: CPT

## 2021-09-21 PROCEDURE — 0 TECHNETIUM SESTAMIBI: Performed by: NURSE PRACTITIONER

## 2021-09-21 PROCEDURE — A9500 TC99M SESTAMIBI: HCPCS | Performed by: NURSE PRACTITIONER

## 2021-09-21 PROCEDURE — 78452 HT MUSCLE IMAGE SPECT MULT: CPT | Performed by: INTERNAL MEDICINE

## 2021-09-21 PROCEDURE — 93017 CV STRESS TEST TRACING ONLY: CPT

## 2021-09-21 RX ORDER — CAFFEINE CITRATE 20 MG/ML
60 SOLUTION INTRAVENOUS ONCE
Status: COMPLETED | OUTPATIENT
Start: 2021-09-21 | End: 2021-09-21

## 2021-09-21 RX ADMIN — TECHNETIUM TC 99M SESTAMIBI 1 DOSE: 1 INJECTION INTRAVENOUS at 14:52

## 2021-09-21 RX ADMIN — CAFFEINE CITRATE 60 MG: 20 INJECTION, SOLUTION INTRAVENOUS at 14:58

## 2021-09-21 RX ADMIN — TECHNETIUM TC 99M SESTAMIBI 1 DOSE: 1 INJECTION INTRAVENOUS at 12:52

## 2021-09-21 RX ADMIN — REGADENOSON 0.4 MG: 0.08 INJECTION, SOLUTION INTRAVENOUS at 14:50

## 2021-09-24 DIAGNOSIS — I25.10 CORONARY ARTERY CALCIFICATION: Primary | ICD-10-CM

## 2021-09-24 DIAGNOSIS — I25.84 CORONARY ARTERY CALCIFICATION: Primary | ICD-10-CM

## 2021-09-24 RX ORDER — ASPIRIN 81 MG/1
81 TABLET ORAL DAILY
Qty: 90 TABLET | Refills: 0 | Status: SHIPPED | OUTPATIENT
Start: 2021-09-24

## 2021-09-24 NOTE — PROGRESS NOTES
Discussed stress test results with patient's health surrogate.  Instructed to begin 81 mg ASA given calcifications noted on stress test.  Follow-up with cardiology as scheduled.

## 2021-09-30 ENCOUNTER — DOCUMENTATION (OUTPATIENT)
Dept: CARDIOLOGY | Facility: HOSPITAL | Age: 86
End: 2021-09-30

## 2021-09-30 DIAGNOSIS — I50.20 HFREF (HEART FAILURE WITH REDUCED EJECTION FRACTION) (HCC): Primary | ICD-10-CM

## 2021-09-30 RX ORDER — FUROSEMIDE 20 MG/1
TABLET ORAL
Qty: 30 TABLET | Refills: 1 | Status: SHIPPED | OUTPATIENT
Start: 2021-09-30

## 2021-09-30 NOTE — PROGRESS NOTES
Received call from home health nurse regarding patient with increased LE edema, weight gain of 8lb in one week.  Reports blood pressure 120/62.  Patient denies dyspnea.    Patient currently not on furosemide.  Recent BMP reviewed with normal renal function.    Instructed to initiate furosemide 20 mg twice daily for 3 days, then transition to 20 mg daily.  Contact heart valve clinic to schedule follow-up appointment next week.    Basic Metabolic Panel (09/07/2021 11:13)

## 2021-10-06 NOTE — PROGRESS NOTES
Electrophysiology Clinic Consult     Laureen Nettles  5/16/1932  [unfilled]  [unfilled]    10/14/21    DATE OF ADMISSION: (Not on file)  [facility] CARDIOLOGY    Criselda Padgett MD  [address] / [facility] [address]  Referring Provider: Pj Anna APRN     Chief Complaint   Patient presents with   • HFrEF     Referring Provider: NASREEN Dangelo     Problem List:    1. CHF  a. S/p BSC ppm implant 9/2018   b. Echo, 8/30/21, EF 30-35%, Mild concentric hypertrophy. Severe AVR. Mod to severe MR. Mild TR.   c. Stress test, 9/21/21, EF 36%, Small fixed apical defect with no reversibility. Heavily calcified arterial structures.   2. HTN  3. HLD  4. Hypothyroidism  5. AV regurgitation  6. B12 deficiency   7. Surgical hx  a. Anal fistula repair  b. Tonsillectomy     History of Present Illness:     Mrs. Nettles is a pleasant 89-year-old female with above-noted past medical history. She lives in [facility].  She is seen today in consultation with Dr. Diaz for further evaluation and management of her [employer] permanent pacemaker. Patient s/p BSC ppm implant in September 2018 with a physician at [facility]. She cannot recall his name. Patient recently hospitalized at [facility] 8/29- 8/31/2021 for newly diagnosed heart failure with reduced ejection fraction. Patient with improvement of respiratory symptoms with IV diuretics. Underwent TTE during hospitalization that showed EF 30 to 35%, moderate to severe LV systolic dysfunction. Severe aortic valve regurgitation present, moderate to severe mitral valve regurgitation present. Since this time patient overall feeling well. Patient denies chest pain/pressure, heart racing, and syncope. She has mild to moderate dyspnea on exertion. She underwent recent stress test on 9/21/2021 with noted EF 36%, small fixed apical defect with no reversibility. Heavily calcified arterial structures. She was initiated  on aspirin daily and is tolerating well without any bleeding issues. Overall patient is feeling well other than her increased LEONG.     Allergies   Allergen Reactions   • Penicillins Anaphylaxis   • Potassium-Containing Compounds Rash        Cannot display prior to admission medications because the patient has not been admitted in this contact.            Current Outpatient Medications:   •  aspirin 81 MG EC tablet, Take 1 tablet by mouth Daily., Disp: 90 tablet, Rfl: 0  •  ergocalciferol (ERGOCALCIFEROL) 1.25 MG (37809 UT) capsule, Take 50,000 Units by mouth 1 (One) Time Per Week., Disp: , Rfl:   •  furosemide (LASIX) 20 MG tablet, Take 1 tab twice daily for 3 days, then continue 1 tab every morning., Disp: 30 tablet, Rfl: 1  •  levothyroxine (SYNTHROID, LEVOTHROID) 88 MCG tablet, Take 88 mcg by mouth Daily., Disp: , Rfl:   •  lisinopril (PRINIVIL,ZESTRIL) 2.5 MG tablet, Take 1 tablet by mouth Daily., Disp: 30 tablet, Rfl: 11  •  metoprolol succinate XL (Toprol XL) 25 MG 24 hr tablet, Take 1 tablet by mouth Daily., Disp: 30 tablet, Rfl: 1  •  rosuvastatin (CRESTOR) 10 MG tablet, Take 10 mg by mouth Daily., Disp: , Rfl:   •  spironolactone (ALDACTONE) 50 MG tablet, Take 1 tablet by mouth Daily., Disp: 30 tablet, Rfl: 1    Social History     Socioeconomic History   • Marital status: Single   Tobacco Use   • Smoking status: Never Smoker   • Smokeless tobacco: Never Used   Vaping Use   • Vaping Use: Never used   Substance and Sexual Activity   • Alcohol use: Never   • Drug use: Never   • Sexual activity: Defer       Family History   Problem Relation Age of Onset   • No Known Problems Mother    • Heart disease Father        REVIEW OF SYSTEMS:   CONST:  No weight loss, fever, chills, weakness or fatigue.   HEENT:  No visual loss, blurred vision, double vision, yellow sclerae.                   No hearing loss, congestion, sore throat.   SKIN:      No rashes, urticaria, ulcers, sores.     RESP:     +LEONG/ shortness of  "breath,- hemoptysis, cough, sputum.   GI:           No anorexia, nausea, vomiting, diarrhea. No abdominal pain, melena.   :         No burning on urination, hematuria or increased frequency.  ENDO:    No diaphoresis, cold or heat intolerance. No polyuria or polydipsia.   NEURO:  No headache, dizziness, syncope, paralysis, ataxia, or parasthesias.                  No change in bowel or bladder control. No history of CVA/TIA  MUSC:    No muscle, back pain, joint pain or stiffness.   HEME:    No anemia, bleeding, bruising. No history of DVT/PE.  PSYCH:  No history of depression, anxiety    Vitals:    10/14/21 1516   BP: 98/48   BP Location: Left arm   Patient Position: Sitting   Cuff Size: Adult   Pulse: 88   SpO2: 96%   Weight: 40.3 kg (88 lb 12.8 oz)   Height: 152.4 cm (60\")                 Physical Exam:  GEN: Well nourished, well-developed, no acute distress  HEENT: Normocephalic, atraumatic, PERRLA, moist mucous membranes  NECK: Supple, NO JVD, no thyromegaly, no lymphadenopathy  CARD: S1S2, RRR, no murmur, gallop, rub  LUNGS: Clear to auscultation, normal respiratory effort  ABDOMEN: Soft, nontender, normal bowel sounds  EXTREMITIES: No gross deformities, no clubbing, cyanosis, or edema  SKIN: Warm, dry  NEURO: No focal deficits, alert and oriented x 3  PSYCHIATRIC: Normal affect and mood      I personally viewed and interpreted the patient's EKG/Telemetry/lab data    Lab Results   Component Value Date    GLUCOSE 93 09/07/2021    CALCIUM 9.5 09/07/2021     09/07/2021    K 4.0 09/07/2021    CO2 27.0 09/07/2021     09/07/2021    BUN 17 09/07/2021    CREATININE 0.87 09/07/2021    EGFRIFNONA 61 09/07/2021    BCR 19.5 09/07/2021    ANIONGAP 9.0 09/07/2021     Lab Results   Component Value Date    WBC 10.74 08/29/2021    HGB 12.0 08/29/2021    HCT 38.4 08/29/2021    MCV 82.1 08/29/2021     08/29/2021     No results found for: INR, PROTIME  Lab Results   Component Value Date    TSH 17.880 (H) " 08/29/2021             ECG 12 Lead    Date/Time: 10/14/2021 3:38 PM  Performed by: Jose Diaz MD  Authorized by: Jose Diaz MD   Comparison: compared with previous ECG from 8/29/2021  Comparison to previous ECG: Previously sinus tachycardia with pvcs  Rhythm: sinus rhythm  Rate: normal  BPM: 88  Comments: PACs          Manual device interrogation: BSC ppm, DDDR at 60, normal function, <1% AMS, Underlying NSR, 6.5 years on battery.   18 episodes of RVR 10-20 seconds.       ICD-10-CM ICD-9-CM   1. Acute systolic CHF (congestive heart failure) (HCC)  I50.21 428.21     428.0   2. Primary hypertension  I10 401.9   3. Aortic valve insufficiency, etiology of cardiac valve disease unspecified  I35.1 424.1       Assessment and Plan:     1. HFrEF   -Newly diagnosed HFrEF, Echo 8/30/21 with LVEF 30-35%, moderate to severe LV systolic dysfunction. S/p BSC ppm implant in 2018 with normal device function today and 6.5 years on battery. <1% AMS.   -Complains of some lower extremity swelling, but on exam it appears to be nonpitting  -Stress test, 9/21/21, EF 36%, Small fixed apical defect with no reversibility. Heavily calcified arterial structures  -Continue metoprolol, lisinopril, spironolactone as prescribed. Entresto a consideration in future if bp will tolerate; will refer to general cardiology as well to help manage this.     2.  Aortic valve regurgitation  -Severe aortic valve regurgitation noted on TTE 8/30/2021  -Denies syncopal episodes     3.  Mitral valve regurgitation  -Moderate to severe mitral valve regurgitation noted on TTE 8/30/2021     4. Essential hypertension  -Well-controlled  -Continue with metoprolol, spironolactone as prescribed       Scribed for Jose Diaz MD by NASREEN Brady. 10/14/2021 16:02 EDT     I, Jose Diaz MD, personally performed the services described in this documentation as scribed by the above named individual in my presence, and it is both accurate and complete.   10/14/2021  16:24 EDT

## 2021-10-14 ENCOUNTER — OFFICE VISIT (OUTPATIENT)
Dept: CARDIOLOGY | Facility: CLINIC | Age: 86
End: 2021-10-14

## 2021-10-14 VITALS
SYSTOLIC BLOOD PRESSURE: 98 MMHG | OXYGEN SATURATION: 96 % | HEIGHT: 60 IN | WEIGHT: 88.8 LBS | HEART RATE: 88 BPM | BODY MASS INDEX: 17.43 KG/M2 | DIASTOLIC BLOOD PRESSURE: 48 MMHG

## 2021-10-14 DIAGNOSIS — I50.21 ACUTE SYSTOLIC CHF (CONGESTIVE HEART FAILURE) (HCC): Primary | ICD-10-CM

## 2021-10-14 DIAGNOSIS — I35.1 AORTIC VALVE INSUFFICIENCY, ETIOLOGY OF CARDIAC VALVE DISEASE UNSPECIFIED: ICD-10-CM

## 2021-10-14 DIAGNOSIS — I10 PRIMARY HYPERTENSION: ICD-10-CM

## 2021-10-14 PROCEDURE — 99214 OFFICE O/P EST MOD 30 MIN: CPT | Performed by: STUDENT IN AN ORGANIZED HEALTH CARE EDUCATION/TRAINING PROGRAM

## 2021-10-14 PROCEDURE — 93000 ELECTROCARDIOGRAM COMPLETE: CPT | Performed by: STUDENT IN AN ORGANIZED HEALTH CARE EDUCATION/TRAINING PROGRAM

## 2021-10-14 PROCEDURE — 93280 PM DEVICE PROGR EVAL DUAL: CPT | Performed by: STUDENT IN AN ORGANIZED HEALTH CARE EDUCATION/TRAINING PROGRAM

## 2021-11-15 ENCOUNTER — TELEPHONE (OUTPATIENT)
Dept: CARDIOLOGY | Facility: CLINIC | Age: 86
End: 2021-11-15

## 2021-11-15 NOTE — TELEPHONE ENCOUNTER
Patient's PCP called with concerns of patients BP. She requested to speak directly to Dr. Diaz. I forwarded her call back number ([phone]) to Dr. Diaz's cell and faxed the last office note to her @ [phone].
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
## 2021-12-07 ENCOUNTER — PATIENT ROUNDING (BHMG ONLY) (OUTPATIENT)
Dept: CARDIOLOGY | Facility: CLINIC | Age: 86
End: 2021-12-07

## 2021-12-07 ENCOUNTER — OFFICE VISIT (OUTPATIENT)
Dept: CARDIOLOGY | Facility: CLINIC | Age: 86
End: 2021-12-07

## 2021-12-07 VITALS
BODY MASS INDEX: 17.67 KG/M2 | OXYGEN SATURATION: 96 % | SYSTOLIC BLOOD PRESSURE: 88 MMHG | DIASTOLIC BLOOD PRESSURE: 42 MMHG | HEART RATE: 72 BPM | WEIGHT: 90 LBS | HEIGHT: 60 IN

## 2021-12-07 DIAGNOSIS — I50.21 ACUTE SYSTOLIC CHF (CONGESTIVE HEART FAILURE) (HCC): Primary | ICD-10-CM

## 2021-12-07 PROCEDURE — 99214 OFFICE O/P EST MOD 30 MIN: CPT | Performed by: INTERNAL MEDICINE

## 2021-12-07 PROCEDURE — 93000 ELECTROCARDIOGRAM COMPLETE: CPT | Performed by: INTERNAL MEDICINE

## 2021-12-07 RX ORDER — SPIRONOLACTONE 25 MG/1
TABLET ORAL
COMMUNITY
Start: 2021-11-19

## 2021-12-07 RX ORDER — LEVOTHYROXINE SODIUM 0.1 MG/1
TABLET ORAL
COMMUNITY
Start: 2021-10-16

## 2021-12-07 RX ORDER — CYANOCOBALAMIN 1000 UG/ML
INJECTION, SOLUTION INTRAMUSCULAR; SUBCUTANEOUS
COMMUNITY
Start: 2021-10-16

## 2021-12-07 NOTE — PROGRESS NOTES
"December 7, 2021    Hello, may I speak with Laureen Nettles? \"YES\"    My name is Mely JEFFERSON.    I am  with [facility] CARDIOLOGY  [address]  [place] [address] [phone].    Before we get started may I verify your date of birth? 5/16/1932 \"Yes, correct\"    I am calling to officially welcome you to our practice and ask about your recent visit. Is this a good time to talk? \"Yes\"    Tell me about your visit with us. What things went well?  \"Everything went well-quick and efficient; all went well-everything clear\"       We're always looking for ways to make our patients' experiences even better. Do you have recommendations on ways we may improve?  \"No\"    Overall were you satisfied with your first visit to our practice? \"Yes\"       I appreciate you taking the time to speak with me today. Is there anything else I can do for you? \"No\"      Thank you, and have a great day.      "

## 2022-04-20 ENCOUNTER — OFFICE VISIT (OUTPATIENT)
Dept: CARDIOLOGY | Facility: CLINIC | Age: 87
End: 2022-04-20

## 2022-04-20 VITALS
HEIGHT: 60 IN | DIASTOLIC BLOOD PRESSURE: 42 MMHG | WEIGHT: 94.4 LBS | BODY MASS INDEX: 18.53 KG/M2 | SYSTOLIC BLOOD PRESSURE: 110 MMHG | HEART RATE: 86 BPM | OXYGEN SATURATION: 98 % | RESPIRATION RATE: 18 BRPM

## 2022-04-20 DIAGNOSIS — Z95.0 CARDIAC PACEMAKER IN SITU: ICD-10-CM

## 2022-04-20 PROCEDURE — 99214 OFFICE O/P EST MOD 30 MIN: CPT | Performed by: INTERNAL MEDICINE

## 2022-04-20 PROCEDURE — 93280 PM DEVICE PROGR EVAL DUAL: CPT | Performed by: INTERNAL MEDICINE

## 2022-04-20 RX ORDER — LISINOPRIL 2.5 MG/1
TABLET ORAL
COMMUNITY
Start: 2022-01-24 | End: 2022-04-20 | Stop reason: ALTCHOICE

## 2022-04-20 NOTE — PROGRESS NOTES
[facility]  Follow Up Visit  Laureen Nettles  5/16/1932    VISIT DATE:  04/20/22    PCP:   Criselda Padgett MD  [address] [facility] [address]          CC:  No chief complaint on file.      Problem List:  1. CHF  1. S/p BSC ppm implant 9/2018   2. Echo, 8/30/21, EF 30-35%, Mild concentric hypertrophy. Severe AVR. Mod to severe MR. Mild TR.   3. Stress test, 9/21/21, EF 36%, Small fixed apical defect with no reversibility. Heavily calcified arterial structures.   2. HTN  3. HLD  4. Hypothyroidism  5. AV regurgitation  6. B12 deficiency   7. Surgical hx  a. Anal fistula repair  b. Tonsillectomy      Reviewed cardiology clinic notes from 2018:  At that time an echocardiogram showed EF 55 to 60% aortic regurgitation by echo in 2017.  She had previously stated she would not desire repair or replacement of the valve..  There is no significant mitral valve abnormality at that time.     Echo March 2021: [facility]:  Mild LVH, mildly reduced LV systolic function with EF 40%, grade 1 diastolic dysfunction, moderate to severe aortic regurgitation with pressure half-time 218, 1+ mitral regurgitation      History of Present Illness:  Laureen Nettles  Is a 89 y.o. female with pertinent cardiac history detailed above.  Patient following up for systolic heart failure and valvular heart disease.  Patient reports doing well today.  She has no complaints of shortness of breath or swelling.  She is hard of hearing.  She has no new complaints today.  States she does not have any home monitoring for her pacemaker.      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        Allergies   Allergen Reactions   • Penicillins Anaphylaxis   • Potassium-Containing Compounds Rash       Social History     Socioeconomic History   • Marital status: Single   Tobacco Use   • Smoking status: Never Smoker   • Smokeless tobacco: Never Used   Vaping Use   • Vaping Use: Never used   Substance and Sexual Activity   • Alcohol use: Never   • Drug use: Never   • Sexual activity: Defer       Family History   Problem Relation Age of Onset   • No Known Problems Mother    • Heart disease Father        Current Medications:    Current Outpatient Medications:   •  aspirin 81 MG EC tablet, Take 1 tablet by mouth Daily., Disp: 90 tablet, Rfl: 0  •  Cholecalciferol 50 MCG (2000 UT) capsule, Vitamin D3 50 mcg (2,000 unit) capsule  Take 1 capsule every day by oral route., Disp: , Rfl:   •  cyanocobalamin 1000 MCG/ML injection, , Disp: , Rfl:   •  ergocalciferol (ERGOCALCIFEROL) 1.25 MG (87986 UT) capsule, Take 50,000 Units by mouth 1 (One) Time Per Week., Disp: , Rfl:   •  furosemide (LASIX) 20 MG tablet, Take 1 tab twice daily for 3 days, then continue 1 tab every morning., Disp: 30 tablet, Rfl: 1  •  levothyroxine (SYNTHROID, LEVOTHROID) 100 MCG tablet, , Disp: , Rfl:   •  levothyroxine (SYNTHROID, LEVOTHROID) 88 MCG tablet, Take 88 mcg by mouth Daily., Disp: , Rfl:   •  metoprolol succinate XL (Toprol XL) 25 MG 24 hr tablet, Take 1 tablet by mouth Daily., Disp: 30 tablet, Rfl: 1  •  Multiple Vitamins-Minerals (PRESERVISION AREDS 2+MULTI VIT PO), PreserVision AREDS, Disp: , Rfl:   •  rosuvastatin (CRESTOR) 10 MG tablet, Take 10 mg by mouth Daily., Disp: , Rfl:   •  spironolactone (ALDACTONE) 25 MG tablet, , Disp: , Rfl:      Review of Systems   Cardiovascular: Negative for chest pain, dyspnea on exertion, irregular heartbeat, leg swelling, near-syncope and palpitations.   Respiratory: Negative for shortness of breath.        Vitals:    04/20/22 1347   BP: 110/42   Pulse: 86   Resp: 18   SpO2: 98%   Weight: 42.8  "kg (94 lb 6.4 oz)   Height: 152.4 cm (60\")       Physical Exam  Constitutional:       Appearance: Normal appearance.   Cardiovascular:      Rate and Rhythm: Normal rate and regular rhythm.      Heart sounds: Murmur (2/6 systolic) heard.   Pulmonary:      Effort: Pulmonary effort is normal.      Breath sounds: Normal breath sounds.   Abdominal:      Palpations: Abdomen is soft.   Musculoskeletal:      Right lower leg: No edema.      Left lower leg: No edema.   Neurological:      General: No focal deficit present.      Mental Status: She is alert.         Diagnostic Data:  Procedures  No results found for: CHLPL, TRIG, HDL, LDLDIRECT  Lab Results   Component Value Date    GLUCOSE 93 09/07/2021    BUN 17 09/07/2021    CREATININE 0.87 09/07/2021     09/07/2021    K 4.0 09/07/2021     09/07/2021    CO2 27.0 09/07/2021     No results found for: HGBA1C  Lab Results   Component Value Date    WBC 10.74 08/29/2021    HGB 12.0 08/29/2021    HCT 38.4 08/29/2021     08/29/2021     [employer] dual-chamber pacemaker interrogation: [facility]  Mild DDDR rate 60-1 30  Right atrial lead: 1% paced, P wave amplitude 4.4 mV, threshold 0.8 V at 0.5 ms, impedance 486 ohms  RV lead less than 1% paced, R wave amplitude greater than 25 mV, threshold 0.7 V at 0.5 ms, impedance 628 ohms  Battery voltage 6.5 years, no significant events programming changes    Assessment:   Diagnosis Plan   1. Cardiac pacemaker in situ         Plan:    1.  Chronic systolic heart failure  -Currently appears compensated and euvolemic  -Appears secondary to her valvular heart disease, ejection fraction has declined over the last 3 years with increasing severity of aortic regurgitation  -EF 30 to 35%  -No evidence of ischemia on stress testing there was a fixed apical defect  -Had discussed with patient and healthcare surrogate, not pursuing any surgical replacement or procedural intervention  -We will continue Toprol-XL 25 mg, " spironolactone 25 mg  -Okay to remain off of lisinopril due to intermittent low BP     2.  Valvular heart disease: Severe aortic regurgitation moderate to severe mitral regurgitation  -With her age would not recommend surgical intervention  -Has no symptoms, normal quality of life     3.  [employer] pacemaker  Normal device check in office today detailed above    Follow-up 6 months        Daniel Ascencio MD [facility]

## 2023-10-30 NOTE — PROGRESS NOTES
[facility]  Follow Up Visit  Laureen Nettles  5/16/1932    VISIT DATE:  10/31/23    PCP:   Criselda Padgett MD  [address] [facility] [address]          CC:  Cardiac pacemaker in situ      Problem List:  CHF  S/p BSC ppm implant 9/2018   Echo, 8/30/21, EF 30-35%, Mild concentric hypertrophy. Severe AVR. Mod to severe MR. Mild TR.   Stress test, 9/21/21, EF 36%, Small fixed apical defect with no reversibility. Heavily calcified arterial structures.   HTN  HLD  Hypothyroidism  AV regurgitation  B12 deficiency   Surgical hx  Anal fistula repair  Tonsillectomy      Reviewed cardiology clinic notes from 2018:  At that time an echocardiogram showed EF 55 to 60% aortic regurgitation by echo in 2017.  She had previously stated she would not desire repair or replacement of the valve..  There is no significant mitral valve abnormality at that time.     Echo March 2021: [facility]:  Mild LVH, mildly reduced LV systolic function with EF 40%, grade 1 diastolic dysfunction, moderate to severe aortic regurgitation with pressure half-time 218, 1+ mitral regurgitation       History of Present Illness:  Laureen Nettles  Is a 89 y.o. female with pertinent cardiac history detailed above.  Patient does have known severe valvular heart disease.  She also has a [employer] pacemaker.  Patient accompanied by her family today.  She resides at  [facility].  She ambulates with a walker but denies chest pain or dyspnea.  She is unaware of any rapid palpitations.  She does not have any heart failure symptoms.  Her device interrogation today did show multiple episodes of atrial fibrillation.  The longest lasting 53 minutes.  She has hearing difficulty but otherwise is very functionally intact.  We discussed risks and benefits of anticoagulation for A-fib.  She is agreeable to a trial of anticoagulation.  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       Allergies   Allergen Reactions    Penicillins Anaphylaxis    Potassium-Containing Compounds Rash       Social History     Socioeconomic History    Marital status: Single   Tobacco Use    Smoking status: Never    Smokeless tobacco: Never   Vaping Use    Vaping Use: Never used   Substance and Sexual Activity    Alcohol use: Never    Drug use: Never    Sexual activity: Defer       Family History   Problem Relation Age of Onset    No Known Problems Mother     Heart disease Father        Current Medications:    Current Outpatient Medications:     aspirin 81 MG EC tablet, Take 1 tablet by mouth Daily., Disp: 90 tablet, Rfl: 0    Cholecalciferol 50 MCG (2000 UT) capsule, Vitamin D3 50 mcg (2,000 unit) capsule  Take 1 capsule every day by oral route., Disp: , Rfl:     cyanocobalamin 1000 MCG/ML injection, Inject 1 mL into the appropriate muscle as directed by prescriber Every 28 (Twenty-Eight) Days., Disp: , Rfl:     ergocalciferol (ERGOCALCIFEROL) 1.25 MG (28364 UT) capsule, Take 1 capsule by mouth 1 (One) Time Per Week., Disp: , Rfl:     furosemide (LASIX) 20 MG tablet, Take 1 tab twice daily for 3 days, then continue 1 tab every morning., Disp: 30 tablet, Rfl: 1    levothyroxine (SYNTHROID, LEVOTHROID) 88 MCG tablet, Take 1 tablet by mouth Daily., Disp: , Rfl:     metoprolol succinate XL (Toprol XL) 25 MG 24 hr tablet, Take 1 tablet by mouth Daily., Disp: 30 tablet, Rfl: 1    Multiple Vitamins-Minerals (PRESERVISION AREDS 2+MULTI VIT PO), PreserVision AREDS, Disp: , Rfl:     rosuvastatin (CRESTOR) 10 MG tablet, Take 1 tablet by mouth Daily., Disp: , Rfl:     spironolactone (ALDACTONE) 25 MG tablet, , Disp: , Rfl:   "    Review of Systems   HENT:          Hard of hearing   Cardiovascular:  Negative for chest pain, dyspnea on exertion, irregular heartbeat and palpitations.       Vitals:    10/31/23 1159   BP: 118/56   BP Location: Right arm   Patient Position: Sitting   Pulse: 93   SpO2: 100%   Weight: 42.4 kg (93 lb 6.4 oz)   Height: 152.4 cm (60\")       Physical Exam  Constitutional:       Appearance: Normal appearance.   Cardiovascular:      Rate and Rhythm: Normal rate.      Heart sounds: Murmur (3/6 diastolic murmur) heard.   Pulmonary:      Breath sounds: No rales.   Musculoskeletal:      Right lower leg: No edema.      Left lower leg: No edema.   Neurological:      Mental Status: She is alert.         Diagnostic Data:  Procedures  No results found for: \"CHLPL\", \"TRIG\", \"HDL\", \"LDLDIRECT\"  Lab Results   Component Value Date    GLUCOSE 93 09/07/2021    BUN 17 09/07/2021    CREATININE 0.87 09/07/2021     09/07/2021    K 4.0 09/07/2021     09/07/2021    CO2 27.0 09/07/2021     No results found for: \"HGBA1C\"  Lab Results   Component Value Date    WBC 10.74 08/29/2021    HGB 12.0 08/29/2021    HCT 38.4 08/29/2021     08/29/2021     [employer] dual-chamber pacemaker model number L3 1 1  Right atrial lead 27% paced, P wave amplitude 3.8 mV, threshold 0.7 V at 0.4 ms, impedance 505  RV lead less than 1% paced, R wave amplitude 21.8 mV, threshold 0.5 V at 0.5 ms, impedance 548 ohms  Battery voltage 5 years  Underlying rhythm sinus at 90  Multiple mode switch events the longest lasting for 63 minutes.  Less than 1% of total    Assessment:   Diagnosis Plan   1. Cardiac pacemaker in situ            Plan:    1.  Chronic systolic heart failure  -Caitlin compensated and euvolemic  -Appears secondary to her valvular heart disease, ejection fraction has declined over the last 3 years with increasing severity of aortic regurgitation  -EF 30 to 35%  -No evidence of ischemia on prior stress testing there was a fixed " apical defect  -Had discussed with patient and healthcare surrogate, not pursuing any surgical replacement or procedural intervention  -We will continue Toprol-XL 25 mg, spironolactone 25 mg       2.  Valvular heart disease: Severe aortic regurgitation moderate to severe mitral regurgitation  -With her age would not recommend surgical intervention  -Has no symptoms, normal quality of life, normal vitals in office     3.  [employer] pacemaker  Device interrogation detailed above.  Most notable finding is new multiple episodes of A-fib    4.  Atrial fibrillation  -Less than 1% total burden but has had events lasting close to an hour with high rates  -discussed anticoagulation, she has remained functional with no new complaints, discussed risks and benefits  -luly d/c aspirin  -Start eliquis 2.5mg BID with close monitoring      Follow-up in the spring      ACP discussion was held with the patient during this visit. Patient has an advance directive in EMR which is still valid.       Daniel Ascencio MD [facility]

## 2023-10-31 ENCOUNTER — OFFICE VISIT (OUTPATIENT)
Dept: CARDIOLOGY | Facility: CLINIC | Age: 88
End: 2023-10-31
Payer: MEDICARE

## 2023-10-31 VITALS
SYSTOLIC BLOOD PRESSURE: 118 MMHG | HEIGHT: 60 IN | WEIGHT: 93.4 LBS | DIASTOLIC BLOOD PRESSURE: 56 MMHG | HEART RATE: 93 BPM | OXYGEN SATURATION: 100 % | BODY MASS INDEX: 18.34 KG/M2

## 2023-10-31 DIAGNOSIS — Z95.0 CARDIAC PACEMAKER IN SITU: Primary | ICD-10-CM

## 2023-10-31 PROCEDURE — 93280 PM DEVICE PROGR EVAL DUAL: CPT | Performed by: INTERNAL MEDICINE

## 2023-10-31 PROCEDURE — 99214 OFFICE O/P EST MOD 30 MIN: CPT | Performed by: INTERNAL MEDICINE

## 2024-04-15 RX ORDER — APIXABAN 2.5 MG/1
2.5 TABLET, FILM COATED ORAL 2 TIMES DAILY
Qty: 60 TABLET | Refills: 6 | Status: SHIPPED | OUTPATIENT
Start: 2024-04-15

## 2024-05-28 DIAGNOSIS — I50.20 HFREF (HEART FAILURE WITH REDUCED EJECTION FRACTION): ICD-10-CM

## 2024-05-29 RX ORDER — FUROSEMIDE 20 MG/1
TABLET ORAL
Qty: 30 TABLET | Refills: 1 | OUTPATIENT
Start: 2024-05-29

## 2024-05-29 NOTE — TELEPHONE ENCOUNTER
[facility] called. RX was previously prescribed by pt's PCP. [facility] is going to reach out to pt's PCP office and see if they will fill medication. Also going to have POA call us to discuss if labs have been collected recently.  [facility] said to hold off on refill at this time, they will reach back out if we need to assist with refill.

## 2024-11-12 RX ORDER — APIXABAN 2.5 MG/1
2.5 TABLET, FILM COATED ORAL 2 TIMES DAILY
Qty: 60 TABLET | Refills: 3 | Status: SHIPPED | OUTPATIENT
Start: 2024-11-12

## 2025-01-20 NOTE — PROGRESS NOTES
[facility]  Follow Up Visit  Laureen Nettles  5/16/1932    VISIT DATE:  01/21/25    PCP:   Criselda Padgett MD  [address] [facility] [address]          CC:  Cardiac pacemaker in situ      Problem List:  CHF  S/p BSC ppm implant 9/2018   Echo, 8/30/21, EF 30-35%, Mild concentric hypertrophy. Severe AVR. Mod to severe MR. Mild TR.   Stress test, 9/21/21, EF 36%, Small fixed apical defect with no reversibility. Heavily calcified arterial structures.   HTN  HLD  Hypothyroidism  AV regurgitation  B12 deficiency   Surgical hx  Anal fistula repair  Tonsillectomy      Reviewed cardiology clinic notes from 2018:  At that time an echocardiogram showed EF 55 to 60% aortic regurgitation by echo in 2017.  She had previously stated she would not desire repair or replacement of the valve..  There is no significant mitral valve abnormality at that time.     Echo March 2021: [facility]:  Mild LVH, mildly reduced LV systolic function with EF 40%, grade 1 diastolic dysfunction, moderate to severe aortic regurgitation with pressure half-time 218, 1+ mitral regurgitation         History of Present Illness:  Laureen Nettles  Is a 89 y.o. female with pertinent cardiac history detailed above.  Patient is hard of hearing.  Accompanied by her family today.  She resides at [facility].  Device check today showed she is infrequently using her pacemaker.  Has not had recent atrial fibrillation.  Patient from my prior echocardiogram in 2021 was found to have systolic heart failure and advanced valvular disease.  However she remains relatively asymptomatic and euvolemic.  Denies chest pain orthopnea or dyspnea.  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       Allergies   Allergen Reactions    Penicillins Anaphylaxis    Potassium-Containing Compounds Rash       Social History     Socioeconomic History    Marital status: Single   Tobacco Use    Smoking status: Never    Smokeless tobacco: Never   Vaping Use    Vaping status: Never Used   Substance and Sexual Activity    Alcohol use: Never    Drug use: Never    Sexual activity: Defer       Family History   Problem Relation Age of Onset    No Known Problems Mother     Heart disease Father        Current Medications:    Current Outpatient Medications:     apixaban (Eliquis) 2.5 MG tablet tablet, TAKE 1 TABLET BY MOUTH 2 (TWO) TIMES A DAY., Disp: 60 tablet, Rfl: 3    Cholecalciferol 50 MCG (2000 UT) capsule, Vitamin D3 50 mcg (2,000 unit) capsule  Take 1 capsule every day by oral route., Disp: , Rfl:     cyanocobalamin 1000 MCG/ML injection, Inject 1 mL into the appropriate muscle as directed by prescriber Every 28 (Twenty-Eight) Days., Disp: , Rfl:     ergocalciferol (ERGOCALCIFEROL) 1.25 MG (39214 UT) capsule, Take 1 capsule by mouth 1 (One) Time Per Week., Disp: , Rfl:     furosemide (LASIX) 20 MG tablet, Take 1 tab twice daily for 3 days, then continue 1 tab every morning., Disp: 30 tablet, Rfl: 1    levothyroxine (SYNTHROID, LEVOTHROID) 88 MCG tablet, Take 1 tablet by mouth Daily., Disp: , Rfl:     metoprolol succinate XL (Toprol XL) 25 MG 24 hr tablet, Take 1 tablet by mouth Daily., Disp: 30 tablet, Rfl: 1    Multiple Vitamins-Minerals (PRESERVISION AREDS 2+MULTI VIT PO), PreserVision AREDS, Disp: , Rfl:     rosuvastatin (CRESTOR) 10 MG tablet, Take 1 tablet by mouth Daily., Disp: , Rfl:     spironolactone (ALDACTONE) 25 MG tablet, , Disp: , Rfl:      Review of Systems   Cardiovascular:  Negative for chest pain, dyspnea on exertion, irregular  "heartbeat and orthopnea.   Respiratory:  Negative for shortness of breath.        Vitals:    01/21/25 1153   BP: 110/48   Pulse: 97   SpO2: 92%   Weight: 39 kg (86 lb)   Height: 152.4 cm (60\")       Physical Exam  Constitutional:       Appearance: Normal appearance.   Cardiovascular:      Rate and Rhythm: Normal rate and regular rhythm.      Pulses: Normal pulses.      Heart sounds: Normal heart sounds.   Pulmonary:      Effort: Pulmonary effort is normal.      Breath sounds: Normal breath sounds.   Musculoskeletal:      Right lower leg: No edema.      Left lower leg: No edema.   Neurological:      Mental Status: She is alert.         Diagnostic Data:    ECG 12 Lead    Date/Time: 1/21/2025 11:58 AM  Performed by: Daniel Ascencio MD    Authorized by: Daniel Ascencio MD  Comparison: compared with previous ECG from 12/27/2021  Comparison to previous ECG: ST changes are improved compared with previous EKG  Rhythm: sinus rhythm  Rate: normal  BPM: 97  QRS axis: left  Other findings: non-specific ST-T wave changes and prolonged QTc interval    Clinical impression: abnormal EKG        No results found for: \"CHLPL\", \"TRIG\", \"HDL\", \"LDLDIRECT\"  Lab Results   Component Value Date    GLUCOSE 93 09/07/2021    BUN 17 09/07/2021    CREATININE 0.87 09/07/2021     09/07/2021    K 4.0 09/07/2021     09/07/2021    CO2 27.0 09/07/2021     No results found for: \"HGBA1C\"  Lab Results   Component Value Date    WBC 10.74 08/29/2021    HGB 12.0 08/29/2021    HCT 38.4 08/29/2021     08/29/2021     [employer] dual-chamber pacemaker model number L3 1 1  Mode DDDR with lower rate of 60  Right atrial lead: 3% paced, P wave amplitude 3.5 mV, threshold 1 V at 0.5 ms, impedance 450 ohms  RV lead less than 1% paced, R wave amplitude 19.5 normals, threshold 1 V at 0.5 ms, impedance 564 ohms  Battery voltage 3 years  1 episode of NSVT.        Assessment:   Diagnosis Plan   1. Cardiac pacemaker in situ  ECG 12 Lead "          Plan:    1.  Chronic systolic heart failure  -Appears secondary to her valvular heart disease,n  -EF 30 to 35%  -No evidence of ischemia on prior stress testing there was a fixed apical defect  -Had discussed with patient and healthcare surrogate, not pursuing any surgical replacement or procedural intervention  -continue Toprol-XL 25 mg, spironolactone 25 mg  -Patient appears euvolemic and compensated.  No heart failure symptoms        2.  Valvular heart disease: Severe aortic regurgitation moderate to severe mitral regurgitation  -With her age would not recommend surgical intervention  -Has no symptoms, normal quality of life,  -Continue medical management     3.  [employer] pacemaker  Device interrogation detailed above.  Stable function.  3 years battery life left  Infrequently paced       4.  Atrial fibrillation  -Identified on device interrogation.  Started on Eliquis 2.5 mg twice daily last office visit  -Not had any recent episodes.    Will continue yearly follow-up at this time.  Patient's family will contact with any issues or concerns in the interim      ACP discussion was held with the patient during this visit. Patient has an advance directive in EMR which is still valid.       Daniel Ascencio MD [facility]

## 2025-01-21 ENCOUNTER — OFFICE VISIT (OUTPATIENT)
Dept: CARDIOLOGY | Facility: CLINIC | Age: OVER 89
End: 2025-01-21
Payer: MEDICARE

## 2025-01-21 ENCOUNTER — TELEPHONE (OUTPATIENT)
Dept: CARDIOLOGY | Facility: CLINIC | Age: OVER 89
End: 2025-01-21

## 2025-01-21 VITALS
OXYGEN SATURATION: 92 % | BODY MASS INDEX: 16.88 KG/M2 | HEIGHT: 60 IN | DIASTOLIC BLOOD PRESSURE: 48 MMHG | WEIGHT: 86 LBS | SYSTOLIC BLOOD PRESSURE: 110 MMHG | HEART RATE: 97 BPM

## 2025-01-21 DIAGNOSIS — I35.1 AORTIC VALVE INSUFFICIENCY, ETIOLOGY OF CARDIAC VALVE DISEASE UNSPECIFIED: ICD-10-CM

## 2025-01-21 DIAGNOSIS — I50.22 CHRONIC SYSTOLIC (CONGESTIVE) HEART FAILURE: ICD-10-CM

## 2025-01-21 DIAGNOSIS — I48.0 PAF (PAROXYSMAL ATRIAL FIBRILLATION): ICD-10-CM

## 2025-01-21 DIAGNOSIS — Z95.0 CARDIAC PACEMAKER IN SITU: Primary | ICD-10-CM

## 2025-01-21 NOTE — TELEPHONE ENCOUNTER
Caller: Lexi Marieara     Relationship: SELF    Best call back number: [phone]    What is your medical concern? PT WAS SEEN IN THE OFFICE TODAY. WHEN SHE RETURNED TO HER CARE FACILITY SHE WAS TESTED FOR COVID AND THE TEST WAS POSITIVE. HER FAMILY WANTED TO LET THE OFFICE KNOW ABOUT IT.

## 2025-01-24 ENCOUNTER — APPOINTMENT (OUTPATIENT)
Dept: GENERAL RADIOLOGY | Facility: HOSPITAL | Age: OVER 89
DRG: 640 | End: 2025-01-24
Payer: MEDICARE

## 2025-01-24 ENCOUNTER — HOSPITAL ENCOUNTER (INPATIENT)
Facility: HOSPITAL | Age: OVER 89
LOS: 8 days | Discharge: NURSING, REHAB OR LONG-TERM CARE | DRG: 640 | End: 2025-02-03
Attending: EMERGENCY MEDICINE | Admitting: INTERNAL MEDICINE
Payer: MEDICARE

## 2025-01-24 DIAGNOSIS — E83.52 HYPERCALCEMIA: ICD-10-CM

## 2025-01-24 DIAGNOSIS — U07.1 COVID-19 VIRUS INFECTION: ICD-10-CM

## 2025-01-24 DIAGNOSIS — E86.0 DEHYDRATION: ICD-10-CM

## 2025-01-24 DIAGNOSIS — N28.9 RENAL INSUFFICIENCY: ICD-10-CM

## 2025-01-24 DIAGNOSIS — N39.0 URINARY TRACT INFECTION WITHOUT HEMATURIA, SITE UNSPECIFIED: Primary | ICD-10-CM

## 2025-01-24 DIAGNOSIS — R53.1 GENERALIZED WEAKNESS: ICD-10-CM

## 2025-01-24 LAB
ALBUMIN SERPL-MCNC: 4.2 G/DL (ref 3.5–5.2)
ALBUMIN/GLOB SERPL: 1.3 G/DL
ALP SERPL-CCNC: 71 U/L (ref 39–117)
ALT SERPL W P-5'-P-CCNC: 9 U/L (ref 1–33)
ANION GAP SERPL CALCULATED.3IONS-SCNC: 20 MMOL/L (ref 5–15)
AST SERPL-CCNC: 17 U/L (ref 1–32)
BACTERIA UR QL AUTO: ABNORMAL /HPF
BASOPHILS # BLD AUTO: 0.06 10*3/MM3 (ref 0–0.2)
BASOPHILS NFR BLD AUTO: 0.6 % (ref 0–1.5)
BILIRUB SERPL-MCNC: 0.4 MG/DL (ref 0–1.2)
BILIRUB UR QL STRIP: NEGATIVE
BUN SERPL-MCNC: 60 MG/DL (ref 8–23)
BUN/CREAT SERPL: 43.2 (ref 7–25)
CALCIUM SPEC-SCNC: 13.2 MG/DL (ref 8.2–9.6)
CHLORIDE SERPL-SCNC: 94 MMOL/L (ref 98–107)
CLARITY UR: ABNORMAL
CO2 SERPL-SCNC: 25 MMOL/L (ref 22–29)
COLOR UR: YELLOW
CREAT SERPL-MCNC: 1.39 MG/DL (ref 0.57–1)
DEPRECATED RDW RBC AUTO: 48.8 FL (ref 37–54)
EGFRCR SERPLBLD CKD-EPI 2021: 35.7 ML/MIN/1.73
EOSINOPHIL # BLD AUTO: 0.03 10*3/MM3 (ref 0–0.4)
EOSINOPHIL NFR BLD AUTO: 0.3 % (ref 0.3–6.2)
ERYTHROCYTE [DISTWIDTH] IN BLOOD BY AUTOMATED COUNT: 14 % (ref 12.3–15.4)
FLUAV RNA RESP QL NAA+PROBE: NOT DETECTED
FLUBV RNA RESP QL NAA+PROBE: NOT DETECTED
GEN 5 1HR TROPONIN T REFLEX: 35 NG/L
GLOBULIN UR ELPH-MCNC: 3.3 GM/DL
GLUCOSE BLDC GLUCOMTR-MCNC: 93 MG/DL (ref 70–130)
GLUCOSE SERPL-MCNC: 106 MG/DL (ref 65–99)
GLUCOSE UR STRIP-MCNC: NEGATIVE MG/DL
HCT VFR BLD AUTO: 46 % (ref 34–46.6)
HGB BLD-MCNC: 15.1 G/DL (ref 12–15.9)
HGB UR QL STRIP.AUTO: ABNORMAL
HOLD SPECIMEN: NORMAL
HYALINE CASTS UR QL AUTO: ABNORMAL /LPF
IMM GRANULOCYTES # BLD AUTO: 0.05 10*3/MM3 (ref 0–0.05)
IMM GRANULOCYTES NFR BLD AUTO: 0.5 % (ref 0–0.5)
KETONES UR QL STRIP: ABNORMAL
LEUKOCYTE ESTERASE UR QL STRIP.AUTO: ABNORMAL
LYMPHOCYTES # BLD AUTO: 1.45 10*3/MM3 (ref 0.7–3.1)
LYMPHOCYTES NFR BLD AUTO: 13.5 % (ref 19.6–45.3)
MAGNESIUM SERPL-MCNC: 2.4 MG/DL (ref 1.7–2.3)
MCH RBC QN AUTO: 30.9 PG (ref 26.6–33)
MCHC RBC AUTO-ENTMCNC: 32.8 G/DL (ref 31.5–35.7)
MCV RBC AUTO: 94.3 FL (ref 79–97)
MONOCYTES # BLD AUTO: 0.72 10*3/MM3 (ref 0.1–0.9)
MONOCYTES NFR BLD AUTO: 6.7 % (ref 5–12)
NEUTROPHILS NFR BLD AUTO: 78.4 % (ref 42.7–76)
NEUTROPHILS NFR BLD AUTO: 8.45 10*3/MM3 (ref 1.7–7)
NITRITE UR QL STRIP: NEGATIVE
NRBC BLD AUTO-RTO: 0 /100 WBC (ref 0–0.2)
PH UR STRIP.AUTO: 5.5 [PH] (ref 5–8)
PLATELET # BLD AUTO: 246 10*3/MM3 (ref 140–450)
PMV BLD AUTO: 12.2 FL (ref 6–12)
POTASSIUM SERPL-SCNC: 4.8 MMOL/L (ref 3.5–5.2)
PROCALCITONIN SERPL-MCNC: 0.24 NG/ML (ref 0–0.25)
PROT SERPL-MCNC: 7.5 G/DL (ref 6–8.5)
PROT UR QL STRIP: ABNORMAL
PTH-INTACT SERPL-MCNC: 16.8 PG/ML (ref 15–65)
QT INTERVAL: 412 MS
QTC INTERVAL: 451 MS
RBC # BLD AUTO: 4.88 10*6/MM3 (ref 3.77–5.28)
RBC # UR STRIP: ABNORMAL /HPF
REF LAB TEST METHOD: ABNORMAL
SARS-COV-2 RNA RESP QL NAA+PROBE: DETECTED
SODIUM SERPL-SCNC: 139 MMOL/L (ref 136–145)
SP GR UR STRIP: 1.02 (ref 1–1.03)
SQUAMOUS #/AREA URNS HPF: ABNORMAL /HPF
TROPONIN T % DELTA: 0
TROPONIN T NUMERIC DELTA: 0 NG/L
TROPONIN T SERPL HS-MCNC: 35 NG/L
UROBILINOGEN UR QL STRIP: ABNORMAL
WBC # UR STRIP: ABNORMAL /HPF
WBC NRBC COR # BLD AUTO: 10.76 10*3/MM3 (ref 3.4–10.8)
WHOLE BLOOD HOLD COAG: NORMAL
WHOLE BLOOD HOLD SPECIMEN: NORMAL

## 2025-01-24 PROCEDURE — 80053 COMPREHEN METABOLIC PANEL: CPT | Performed by: EMERGENCY MEDICINE

## 2025-01-24 PROCEDURE — 83735 ASSAY OF MAGNESIUM: CPT | Performed by: EMERGENCY MEDICINE

## 2025-01-24 PROCEDURE — 84484 ASSAY OF TROPONIN QUANT: CPT | Performed by: EMERGENCY MEDICINE

## 2025-01-24 PROCEDURE — 87086 URINE CULTURE/COLONY COUNT: CPT | Performed by: INTERNAL MEDICINE

## 2025-01-24 PROCEDURE — 84145 PROCALCITONIN (PCT): CPT | Performed by: INTERNAL MEDICINE

## 2025-01-24 PROCEDURE — 87636 SARSCOV2 & INF A&B AMP PRB: CPT | Performed by: EMERGENCY MEDICINE

## 2025-01-24 PROCEDURE — 83970 ASSAY OF PARATHORMONE: CPT | Performed by: INTERNAL MEDICINE

## 2025-01-24 PROCEDURE — 99222 1ST HOSP IP/OBS MODERATE 55: CPT | Performed by: INTERNAL MEDICINE

## 2025-01-24 PROCEDURE — G0378 HOSPITAL OBSERVATION PER HR: HCPCS

## 2025-01-24 PROCEDURE — 93005 ELECTROCARDIOGRAM TRACING: CPT | Performed by: EMERGENCY MEDICINE

## 2025-01-24 PROCEDURE — 99285 EMERGENCY DEPT VISIT HI MDM: CPT

## 2025-01-24 PROCEDURE — 82948 REAGENT STRIP/BLOOD GLUCOSE: CPT

## 2025-01-24 PROCEDURE — 81001 URINALYSIS AUTO W/SCOPE: CPT | Performed by: EMERGENCY MEDICINE

## 2025-01-24 PROCEDURE — 71045 X-RAY EXAM CHEST 1 VIEW: CPT

## 2025-01-24 PROCEDURE — P9612 CATHETERIZE FOR URINE SPEC: HCPCS

## 2025-01-24 PROCEDURE — 85025 COMPLETE CBC W/AUTO DIFF WBC: CPT | Performed by: EMERGENCY MEDICINE

## 2025-01-24 PROCEDURE — 25010000002 CEFAZOLIN PER 500 MG: Performed by: INTERNAL MEDICINE

## 2025-01-24 PROCEDURE — 93005 ELECTROCARDIOGRAM TRACING: CPT

## 2025-01-24 PROCEDURE — 25810000003 SODIUM CHLORIDE 0.9 % SOLUTION: Performed by: INTERNAL MEDICINE

## 2025-01-24 PROCEDURE — 36415 COLL VENOUS BLD VENIPUNCTURE: CPT

## 2025-01-24 PROCEDURE — 25810000003 SODIUM CHLORIDE 0.9 % SOLUTION: Performed by: EMERGENCY MEDICINE

## 2025-01-24 RX ORDER — SODIUM CHLORIDE 0.9 % (FLUSH) 0.9 %
10 SYRINGE (ML) INJECTION AS NEEDED
Status: DISCONTINUED | OUTPATIENT
Start: 2025-01-24 | End: 2025-02-03 | Stop reason: HOSPADM

## 2025-01-24 RX ORDER — SPIRONOLACTONE 25 MG/1
25 TABLET ORAL DAILY
Status: DISCONTINUED | OUTPATIENT
Start: 2025-01-25 | End: 2025-02-03 | Stop reason: HOSPADM

## 2025-01-24 RX ORDER — NITROGLYCERIN 0.4 MG/1
0.4 TABLET SUBLINGUAL
Status: DISCONTINUED | OUTPATIENT
Start: 2025-01-24 | End: 2025-01-26

## 2025-01-24 RX ORDER — LEVOTHYROXINE SODIUM 88 UG/1
88 TABLET ORAL
Status: DISCONTINUED | OUTPATIENT
Start: 2025-01-25 | End: 2025-02-03 | Stop reason: HOSPADM

## 2025-01-24 RX ORDER — SODIUM CHLORIDE 9 MG/ML
40 INJECTION, SOLUTION INTRAVENOUS AS NEEDED
Status: DISCONTINUED | OUTPATIENT
Start: 2025-01-24 | End: 2025-02-03 | Stop reason: HOSPADM

## 2025-01-24 RX ORDER — POLYETHYLENE GLYCOL 3350 17 G/17G
17 POWDER, FOR SOLUTION ORAL DAILY PRN
Status: DISCONTINUED | OUTPATIENT
Start: 2025-01-24 | End: 2025-01-31

## 2025-01-24 RX ORDER — PHENOL 1.4 %
600 AEROSOL, SPRAY (ML) MUCOUS MEMBRANE 2 TIMES DAILY WITH MEALS
COMMUNITY
End: 2025-02-03 | Stop reason: HOSPADM

## 2025-01-24 RX ORDER — ALENDRONATE SODIUM 70 MG/1
70 TABLET ORAL
COMMUNITY

## 2025-01-24 RX ORDER — BISACODYL 5 MG/1
5 TABLET, DELAYED RELEASE ORAL DAILY PRN
Status: DISCONTINUED | OUTPATIENT
Start: 2025-01-24 | End: 2025-01-31

## 2025-01-24 RX ORDER — AMOXICILLIN 250 MG
2 CAPSULE ORAL 2 TIMES DAILY PRN
Status: DISCONTINUED | OUTPATIENT
Start: 2025-01-24 | End: 2025-01-31

## 2025-01-24 RX ORDER — FUROSEMIDE 20 MG/1
20 TABLET ORAL DAILY
Status: DISCONTINUED | OUTPATIENT
Start: 2025-01-25 | End: 2025-02-03 | Stop reason: HOSPADM

## 2025-01-24 RX ORDER — ONDANSETRON 2 MG/ML
4 INJECTION INTRAMUSCULAR; INTRAVENOUS EVERY 6 HOURS PRN
Status: DISCONTINUED | OUTPATIENT
Start: 2025-01-24 | End: 2025-02-03 | Stop reason: HOSPADM

## 2025-01-24 RX ORDER — BISACODYL 10 MG
10 SUPPOSITORY, RECTAL RECTAL DAILY PRN
Status: DISCONTINUED | OUTPATIENT
Start: 2025-01-24 | End: 2025-01-31

## 2025-01-24 RX ORDER — SODIUM CHLORIDE 0.9 % (FLUSH) 0.9 %
10 SYRINGE (ML) INJECTION EVERY 12 HOURS SCHEDULED
Status: DISCONTINUED | OUTPATIENT
Start: 2025-01-24 | End: 2025-02-03 | Stop reason: HOSPADM

## 2025-01-24 RX ORDER — SODIUM CHLORIDE 9 MG/ML
100 INJECTION, SOLUTION INTRAVENOUS CONTINUOUS
Status: ACTIVE | OUTPATIENT
Start: 2025-01-24 | End: 2025-01-25

## 2025-01-24 RX ORDER — ACETAMINOPHEN 325 MG/1
650 TABLET ORAL EVERY 4 HOURS PRN
Status: DISCONTINUED | OUTPATIENT
Start: 2025-01-24 | End: 2025-02-03 | Stop reason: HOSPADM

## 2025-01-24 RX ORDER — METOPROLOL SUCCINATE 25 MG/1
25 TABLET, EXTENDED RELEASE ORAL DAILY
Status: DISCONTINUED | OUTPATIENT
Start: 2025-01-25 | End: 2025-02-03 | Stop reason: HOSPADM

## 2025-01-24 RX ADMIN — SODIUM CHLORIDE 100 ML/HR: 9 INJECTION, SOLUTION INTRAVENOUS at 15:56

## 2025-01-24 RX ADMIN — APIXABAN 2.5 MG: 2.5 TABLET, FILM COATED ORAL at 20:33

## 2025-01-24 RX ADMIN — Medication 10 ML: at 15:59

## 2025-01-24 RX ADMIN — Medication 10 ML: at 20:33

## 2025-01-24 RX ADMIN — SODIUM CHLORIDE 500 ML: 9 INJECTION, SOLUTION INTRAVENOUS at 13:23

## 2025-01-24 RX ADMIN — CEFAZOLIN 1000 MG: 1 INJECTION, POWDER, FOR SOLUTION INTRAMUSCULAR; INTRAVENOUS at 17:15

## 2025-01-24 NOTE — ED NOTES
" Laureen Nettles    Nursing Report ED to Floor:  Mental status: alert and oriented to self, place, and situation  Ambulatory status: unsure, has not ambulated here  Oxygen Therapy:  room air  Cardiac Rhythm: NSR  Admitted from: ED  Safety Concerns:  falls risk  Social Issues: n/a  ED Room #:  17    ED Nurse Phone Extension - [phone] or may call [phone].      HPI:   Chief Complaint   Patient presents with    Weakness - Generalized       Past Medical History:  Past Medical History:   Diagnosis Date    Aortic valve regurgitation     B12 deficiency     COVID-19 02/2022    Falls     Heart failure     HLD (hyperlipidemia)     HTN (hypertension)     Hypothyroidism     Pacemaker         Past Surgical History:  Past Surgical History:   Procedure Laterality Date    ANAL FISTULA REPAIR      TONSILLECTOMY          Admitting Doctor:   Caroline Chen DO    Consulting Provider(s):  Consults       No orders found from 12/26/2024 to 1/25/2025.             Admitting Diagnosis:   The primary encounter diagnosis was Urinary tract infection without hematuria, site unspecified. Diagnoses of Generalized weakness, Renal insufficiency, Dehydration, Hypercalcemia, and COVID-19 virus infection were also pertinent to this visit.    Most Recent Vitals:   Vitals:    01/24/25 1149 01/24/25 1230 01/24/25 1300 01/24/25 1330   BP: 109/47 127/46 123/41 132/43   BP Location: Right arm      Patient Position: Sitting      Pulse: 79 67 65 62   Resp: 16      Temp: 97.6 °F (36.4 °C)      TempSrc: Oral      SpO2: 93% 95% 95% 96%   Weight: 39 kg (86 lb)      Height: 152.4 cm (60\")          Active LDAs/IV Access:   Lines, Drains & Airways       Active LDAs       Name Placement date Placement time Site Days    Peripheral IV 01/24/25 1154 Right Antecubital 01/24/25  1154  Antecubital  less than 1                    Labs (abnormal labs have a star):   Labs Reviewed   COVID-19 AND FLU A/B, NP SWAB IN TRANSPORT MEDIA 1 HR TAT - Abnormal; Notable for the " following components:       Result Value    COVID19 Detected (*)     All other components within normal limits    Narrative:     Fact sheet for providers: https://www.fda.gov/media/738518/download    Fact sheet for patients: https://www.fda.gov/media/261991/download    Test performed by PCR.  Influenza A and Influenza B negative results should be considered presumptive in samples that have a positive SARS-CoV-2 result.    Competitive inhibition studies showed that SARS-CoV-2 virus, when present at concentrations above 3.6E+04 copies/mL, can inhibit the detection and amplification of influenza A and influenza B virus RNA if present at or below 1.8E+02 copies/mL or 4.9E+02 copies/mL, respectively, and may lead to false negative influenza virus results. If co-infection with influenza A or influenza B virus is suspected in samples with a positive SARS-CoV-2 result, the sample should be re-tested with another FDA cleared, approved, or authorized influenza test, if influenza virus detection would change clinical management.   COMPREHENSIVE METABOLIC PANEL - Abnormal; Notable for the following components:    Glucose 106 (*)     BUN 60 (*)     Creatinine 1.39 (*)     Chloride 94 (*)     Calcium 13.2 (*)     BUN/Creatinine Ratio 43.2 (*)     Anion Gap 20.0 (*)     eGFR 35.7 (*)     All other components within normal limits    Narrative:     GFR Categories in Chronic Kidney Disease (CKD)      GFR Category          GFR (mL/min/1.73)    Interpretation  G1                     90 or greater         Normal or high (1)  G2                      60-89                Mild decrease (1)  G3a                   45-59                Mild to moderate decrease  G3b                   30-44                Moderate to severe decrease  G4                    15-29                Severe decrease  G5                    14 or less           Kidney failure          (1)In the absence of evidence of kidney disease, neither GFR category G1 or G2 fulfill  the criteria for CKD.    eGFR calculation 2021 CKD-EPI creatinine equation, which does not include race as a factor   TROPONIN - Abnormal; Notable for the following components:    HS Troponin T 35 (*)     All other components within normal limits    Narrative:     High Sensitive Troponin T Reference Range:  <14.0 ng/L- Negative Female for AMI  <22.0 ng/L- Negative Male for AMI  >=14 - Abnormal Female indicating possible myocardial injury.  >=22 - Abnormal Male indicating possible myocardial injury.   Clinicians would have to utilize clinical acumen, EKG, Troponin, and serial changes to determine if it is an Acute Myocardial Infarction or myocardial injury due to an underlying chronic condition.        MAGNESIUM - Abnormal; Notable for the following components:    Magnesium 2.4 (*)     All other components within normal limits   CBC WITH AUTO DIFFERENTIAL - Abnormal; Notable for the following components:    MPV 12.2 (*)     Neutrophil % 78.4 (*)     Lymphocyte % 13.5 (*)     Neutrophils, Absolute 8.45 (*)     All other components within normal limits   URINALYSIS W/ MICROSCOPIC IF INDICATED (NO CULTURE) - Abnormal; Notable for the following components:    Appearance, UA Turbid (*)     Ketones, UA 15 mg/dL (1+) (*)     Blood, UA Moderate (2+) (*)     Protein, UA 30 mg/dL (1+) (*)     Leuk Esterase, UA Large (3+) (*)     All other components within normal limits   HIGH SENSITIVITIY TROPONIN T 1HR - Abnormal; Notable for the following components:    HS Troponin T 35 (*)     All other components within normal limits    Narrative:     High Sensitive Troponin T Reference Range:  <14.0 ng/L- Negative Female for AMI  <22.0 ng/L- Negative Male for AMI  >=14 - Abnormal Female indicating possible myocardial injury.  >=22 - Abnormal Male indicating possible myocardial injury.   Clinicians would have to utilize clinical acumen, EKG, Troponin, and serial changes to determine if it is an Acute Myocardial Infarction or myocardial  injury due to an underlying chronic condition.        URINALYSIS, MICROSCOPIC ONLY - Abnormal; Notable for the following components:    RBC, UA 3-5 (*)     WBC, UA Too Numerous to Count (*)     Bacteria, UA 4+ (*)     Squamous Epithelial Cells, UA 7-12 (*)     All other components within normal limits   POCT GLUCOSE FINGERSTICK - Normal   COVID PRE-OP / PRE-PROCEDURE SCREENING ORDER (NO ISOLATION)    Narrative:     The following orders were created for panel order COVID PRE-OP / PRE-PROCEDURE SCREENING ORDER (NO ISOLATION) - Swab, Nasopharynx.  Procedure                               Abnormality         Status                     ---------                               -----------         ------                     COVID-19 and FLU A/B PCR...[[id number]]  Abnormal            Final result                 Please view results for these tests on the individual orders.   URINE CULTURE   RAINBOW DRAW    Narrative:     The following orders were created for panel order [place] Draw.  Procedure                               Abnormality         Status                     ---------                               -----------         ------                     Green Top (Gel)[[id number]]                                  Final result               Lavender Top[[id number]]                                     Final result               Gold Top - SST[[id number]]                                   Final result               Flood Top[[id number]]                                         Final result               Light Blue Top[[id number]]                                   Final result                 Please view results for these tests on the individual orders.   PROCALCITONIN   PTH, INTACT   POCT GLUCOSE FINGERSTICK   CBC AND DIFFERENTIAL    Narrative:     The following orders were created for panel order CBC & Differential.  Procedure                               Abnormality         Status                     ---------                                -----------         ------                     CBC Auto Differential[[id number]]        Abnormal            Final result                 Please view results for these tests on the individual orders.   GREEN TOP   LAVENDER TOP   GOLD TOP - SST   GRAY TOP   LIGHT BLUE TOP       Meds Given in ED:   Medications   sodium chloride 0.9 % flush 10 mL (has no administration in time range)   sodium chloride 0.9 % flush 10 mL (has no administration in time range)   sodium chloride 0.9 % flush 10 mL (has no administration in time range)   sodium chloride 0.9 % infusion 40 mL (has no administration in time range)   nitroglycerin (NITROSTAT) SL tablet 0.4 mg (has no administration in time range)   sodium chloride 0.9 % infusion (has no administration in time range)   Potassium Replacement - Follow Nurse / BPA Driven Protocol (has no administration in time range)   Magnesium Standard Dose Replacement - Follow Nurse / BPA Driven Protocol (has no administration in time range)   Phosphorus Replacement - Follow Nurse / BPA Driven Protocol (has no administration in time range)   Calcium Replacement - Follow Nurse / BPA Driven Protocol (has no administration in time range)   acetaminophen (TYLENOL) tablet 650 mg (has no administration in time range)   sennosides-docusate (PERICOLACE) 8.6-50 MG per tablet 2 tablet (has no administration in time range)     And   polyethylene glycol (MIRALAX) packet 17 g (has no administration in time range)     And   bisacodyl (DULCOLAX) EC tablet 5 mg (has no administration in time range)     And   bisacodyl (DULCOLAX) suppository 10 mg (has no administration in time range)   ondansetron (ZOFRAN) injection 4 mg (has no administration in time range)   apixaban (ELIQUIS) tablet 2.5 mg (has no administration in time range)   furosemide (LASIX) tablet 20 mg ( Oral Dose Auto Held 2/2/25 0900)   levothyroxine (SYNTHROID, LEVOTHROID) tablet 88 mcg (has no administration in time range)   metoprolol  succinate XL (TOPROL-XL) 24 hr tablet 25 mg (has no administration in time range)   spironolactone (ALDACTONE) tablet 25 mg ( Oral Dose Auto Held 2/2/25 0900)   sodium chloride 0.9 % bolus 500 mL (500 mL Intravenous New Bag 1/24/25 1323)     sodium chloride, 100 mL/hr         Last NIH score:                                                          Dysphagia screening results:        [place] Coma Scale:  No data recorded     CIWA:        Restraint Type:            Isolation Status:  Contact and Airborne

## 2025-01-24 NOTE — H&P
[facility] Medicine Services  HISTORY AND PHYSICAL    Patient Name: Laureen Nettles  : 1932  MRN: [id number]  Primary Care Physician: Criselda Padgett MD  Date of admission: 2025      Subjective   Subjective     Chief Complaint:  Lethargy    HPI:  Laureen Nettles is a 89 y.o. female with history of hypothyroidism and paroxysmal A-fib who lives at [facility] and sent for lethargy, mental status changes.  Labs significant for hypercalcemia and mild MEENAKSHI.  Patient is poor historian and significantly hard of hearing so difficult to get good history while wearing a mask.  She denies any pain or difficulty breathing.  She is sleepy and opens her eyes to answer a question and then falls back to sleep.      Personal History     Past Medical History:   Diagnosis Date    Aortic valve regurgitation     B12 deficiency     COVID-19 2022    Falls     Heart failure     HLD (hyperlipidemia)     HTN (hypertension)     Hypothyroidism     Pacemaker            Past Surgical History:   Procedure Laterality Date    ANAL FISTULA REPAIR      TONSILLECTOMY         Family History: family history includes Heart disease in her father; No Known Problems in her mother.     Social History:  reports that she has never smoked. She has never used smokeless tobacco. She reports that she does not drink alcohol and does not use drugs.  Social History     Social History Narrative    Caffeine 1 serving of coffee in the morning       Medications:  Available home medication information reviewed.  Cholecalciferol, Multiple Vitamins-Minerals, alendronate, apixaban, calcium carbonate, cyanocobalamin, furosemide, levothyroxine, metoprolol succinate XL, rosuvastatin, and spironolactone    Allergies   Allergen Reactions    Penicillins Anaphylaxis    Potassium-Containing Compounds Rash       Objective   Objective     Vital Signs:   Temp:  [97.6 °F (36.4 °C)] 97.6 °F (36.4 °C)  Heart Rate:  [62-79] 62  Resp:  [16] 16  BP:  (109-132)/(41-47) 132/43       Physical Exam  Constitutional:       Appearance: She is ill-appearing.   HENT:      Head: Normocephalic and atraumatic.      Mouth/Throat:      Mouth: Mucous membranes are dry.   Eyes:      Pupils: Pupils are equal, round, and reactive to light.   Cardiovascular:      Rate and Rhythm: Normal rate and regular rhythm.      Heart sounds: Murmur heard.   Pulmonary:      Effort: No respiratory distress.      Breath sounds: Normal breath sounds.   Abdominal:      General: There is no distension.   Musculoskeletal:         General: No swelling or tenderness.   Neurological:      Mental Status: She is disoriented.            Result Review:  I have personally reviewed the results from the time of this admission to 1/24/2025 14:00 EST and agree with these findings:  [x]  Laboratory list / accordion  []  Microbiology  []  Radiology  []  EKG/Telemetry   []  Cardiology/Vascular   []  Pathology  [x]  Old records  []  Other:  Most notable findings include:       LAB RESULTS:      Lab 01/24/25  1153   WBC 10.76   HEMOGLOBIN 15.1   HEMATOCRIT 46.0   PLATELETS 246   NEUTROS ABS 8.45*   IMMATURE GRANS (ABS) 0.05   LYMPHS ABS 1.45   MONOS ABS 0.72   EOS ABS 0.03   MCV 94.3         Lab 01/24/25  1153   SODIUM 139   POTASSIUM 4.8   CHLORIDE 94*   CO2 25.0   ANION GAP 20.0*   BUN 60*   CREATININE 1.39*   EGFR 35.7*   GLUCOSE 106*   CALCIUM 13.2*   MAGNESIUM 2.4*         Lab 01/24/25  1153   TOTAL PROTEIN 7.5   ALBUMIN 4.2   GLOBULIN 3.3   ALT (SGPT) 9   AST (SGOT) 17   BILIRUBIN 0.4   ALK PHOS 71         Lab 01/24/25  1310 01/24/25  1153   HSTROP T 35* 35*                 UA          1/24/2025    12:22   Urinalysis   Squamous Epithelial Cells, UA 7-12    Specific [place], UA 1.017    Ketones, UA 15 mg/dL (1+)    Blood, UA Moderate (2+)    Leukocytes, UA Large (3+)    Nitrite, UA Negative    RBC, UA 3-5    WBC, UA Too Numerous to Count    Bacteria, UA 4+        Microbiology Results (last 10 days)        Procedure Component Value - Date/Time    COVID PRE-OP / PRE-PROCEDURE SCREENING ORDER (NO ISOLATION) - Swab, Nasopharynx [[id number]]  (Abnormal) Collected: 01/24/25 1201    Lab Status: Final result Specimen: Swab from Nasopharynx Updated: 01/24/25 1256    Narrative:      The following orders were created for panel order COVID PRE-OP / PRE-PROCEDURE SCREENING ORDER (NO ISOLATION) - Swab, Nasopharynx.  Procedure                               Abnormality         Status                     ---------                               -----------         ------                     COVID-19 and FLU A/B PCR...[[id number]]  Abnormal            Final result                 Please view results for these tests on the individual orders.    COVID-19 and FLU A/B PCR, 1 HR TAT - Swab, Nasopharynx [[id number]]  (Abnormal) Collected: 01/24/25 1201    Lab Status: Final result Specimen: Swab from Nasopharynx Updated: 01/24/25 1256     COVID19 Detected     Influenza A PCR Not Detected     Influenza B PCR Not Detected    Narrative:      Fact sheet for providers: https://www.fda.gov/media/716080/download    Fact sheet for patients: https://www.fda.gov/media/744405/download    Test performed by PCR.  Influenza A and Influenza B negative results should be considered presumptive in samples that have a positive SARS-CoV-2 result.    Competitive inhibition studies showed that SARS-CoV-2 virus, when present at concentrations above 3.6E+04 copies/mL, can inhibit the detection and amplification of influenza A and influenza B virus RNA if present at or below 1.8E+02 copies/mL or 4.9E+02 copies/mL, respectively, and may lead to false negative influenza virus results. If co-infection with influenza A or influenza B virus is suspected in samples with a positive SARS-CoV-2 result, the sample should be re-tested with another FDA cleared, approved, or authorized influenza test, if influenza virus detection would change clinical management.            XR Chest  1 View    Result Date: 1/24/2025  XR CHEST 1 VW Date of Exam: 1/24/2025 11:49 AM EST Indication: Weak/Dizzy/AMS triage protocol Comparison: Chest x-ray 8/29/2021 Findings: The heart is stable in size. There is some left lower lobe airspace disease at the costophrenic angle and suspected underlying pleural effusion. No evidence for pneumothorax. Cardiac pacemaker is noted.     Impression: Impression: Some suspected left lower lobe airspace disease of the costophrenic angle with potential trace effusion. Electronically Signed: Adriana Asencio MD  1/24/2025 12:22 PM EST  Workstation ID: [id number]     Results for orders placed during the hospital encounter of 08/29/21    Adult Transthoracic Echo Complete W/ Cont if Necessary Per Protocol    Interpretation Summary  · Estimated left ventricular EF = 30-35%. Moderate to severe LV systolic dysfunction.  · Left ventricular wall thickness is consistent with mild concentric hypertrophy.  · Left ventricular diastolic function is consistent with (grade II w/high LAP) pseudonormalization.  · Severe aortic valve regurgitation is present.  · Moderate to severe mitral valve regurgitation is present.  · Mild tricuspid valve regurgitation is present  · Estimated right ventricular systolic pressure from tricuspid regurgitation is normal (<35 mmHg).      Assessment & Plan   Assessment & Plan       UTI (urinary tract infection)    89-year-old female with history of paroxysmal A-fib, admitted for lethargy and mental status changes.  Labs significant for hypercalcemia as well as possible UTI.      Mental status changes  -Suspect multifactorial  -Labs significant for likely decreased p.o. intake as well as hypercalcemia  -Hold vitamin D and calcium supplementation  -Normal saline at 100 cc/h  -Repeat ionized calcium in a.m.  -Check PTH  -Check TSH in a.m.    MEENAKSHI  -Secondary to decreased p.o. intake  -Hold Lasix and spironolactone  -Check renal function in a.m. after IVF      Recent COVID  infection  -On room air  -Monitor for now    Hypercalcemia  -Suspect secondary to supplementation  -See above    Possible UTI  -Procalcitonin added, pending  -Add urine culture  -Will treat with cefazolin empirically for now, DC if urine culture comes back without growth. Has allergy to PCN but patient is poor historian and no micro history on file here    Hard of hearing    Paroxysmal A-fib  -On low-dose Eliquis  -Follows with cardiology here  -Continue metoprolol XL with hold parameters          VTE Prophylaxis:  Pharmacologic & mechanical VTE prophylaxis orders are present.          CODE STATUS:    Code Status and Medical Interventions: CPR (Attempt to Resuscitate); Full Support   Ordered at: 01/24/25 1345     Level Of Support Discussed With:    Patient     Code Status (Patient has no pulse and is not breathing):    CPR (Attempt to Resuscitate)     Medical Interventions (Patient has pulse or is breathing):    Full Support       Expected Discharge   Expected discharge date/ time has not been documented.     Caroline Chen, DO  01/24/25

## 2025-01-24 NOTE — ED PROVIDER NOTES
[facility]    EMERGENCY DEPARTMENT ENCOUNTER      Pt Name: Laureen Nettles  MRN: [id number]  YOB: 1932  Date of evaluation: 1/24/2025  Provider: Cr Cavanaugh DO    CHIEF COMPLAINT       Chief Complaint   Patient presents with    Weakness - Generalized     HPI  Stated Reason for Visit: PT PRESENTS TO THE ED WITH GENERALIZED WEAKNESS. DX WITH COVID RECENTLY. FACILITY STAFF IS CONCERNED FOR A UTI. History Obtained From: EMS;transferring facility     HISTORY OF PRESENT ILLNESS  (Location/Symptom, Timing/Onset, Context/Setting, Quality, Duration, Modifying Factors, Severity.)   Laureen Nettles is a 89 y.o. female who presents to the emergency department from her nursing facility secondary to generalized weakness.  She has been diagnosed with COVID recently.  There was a concern about possible urinary tract infection as she been generally weak.  The patient herself states she does not know why she is here, does not have any acute complaints, she is very hard of hearing, she exhibits frustration with being at the hospital, did not have any other acute issues.  She denies any discomfort, chest pain or shortness of breath, she has no other acute systemic complaints.      Nursing notes were reviewed.      PAST MEDICAL HISTORY     Past Medical History:   Diagnosis Date    Aortic valve regurgitation     B12 deficiency     COVID-19 02/2022    Falls     Heart failure     HLD (hyperlipidemia)     HTN (hypertension)     Hypothyroidism     Pacemaker          SURGICAL HISTORY       Past Surgical History:   Procedure Laterality Date    ANAL FISTULA REPAIR      TONSILLECTOMY           CURRENT MEDICATIONS       Current Facility-Administered Medications:     sodium chloride 0.9 % bolus 500 mL, 500 mL, Intravenous, Once, Cr Cavanaugh DO    sodium chloride 0.9 % flush 10 mL, 10 mL, Intravenous, PRN, Cr Cavanaugh DO    Current Outpatient Medications:     apixaban (Eliquis) 2.5 MG tablet tablet, TAKE 1  "TABLET BY MOUTH 2 (TWO) TIMES A DAY., Disp: 60 tablet, Rfl: 3    Cholecalciferol 50 MCG (2000 UT) capsule, Vitamin D3 50 mcg (2,000 unit) capsule  Take 1 capsule every day by oral route., Disp: , Rfl:     cyanocobalamin 1000 MCG/ML injection, Inject 1 mL into the appropriate muscle as directed by prescriber Every 28 (Twenty-Eight) Days., Disp: , Rfl:     ergocalciferol (ERGOCALCIFEROL) 1.25 MG (96221 UT) capsule, Take 1 capsule by mouth 1 (One) Time Per Week., Disp: , Rfl:     furosemide (LASIX) 20 MG tablet, Take 1 tab twice daily for 3 days, then continue 1 tab every morning., Disp: 30 tablet, Rfl: 1    levothyroxine (SYNTHROID, LEVOTHROID) 88 MCG tablet, Take 1 tablet by mouth Daily., Disp: , Rfl:     metoprolol succinate XL (Toprol XL) 25 MG 24 hr tablet, Take 1 tablet by mouth Daily., Disp: 30 tablet, Rfl: 1    Multiple Vitamins-Minerals (PRESERVISION AREDS 2+MULTI VIT PO), PreserVision AREDS, Disp: , Rfl:     rosuvastatin (CRESTOR) 10 MG tablet, Take 1 tablet by mouth Daily., Disp: , Rfl:     spironolactone (ALDACTONE) 25 MG tablet, , Disp: , Rfl:     ALLERGIES     Penicillins and Potassium-containing compounds    FAMILY HISTORY       Family History   Problem Relation Age of Onset    No Known Problems Mother     Heart disease Father           SOCIAL HISTORY       Social History     Socioeconomic History    Marital status: Single   Tobacco Use    Smoking status: Never    Smokeless tobacco: Never   Vaping Use    Vaping status: Never Used   Substance and Sexual Activity    Alcohol use: Never    Drug use: Never    Sexual activity: Defer         PHYSICAL EXAM    (up to 7 for level 4, 8 or more for level 5)     Vitals:    01/24/25 1149 01/24/25 1230 01/24/25 1300   BP: 109/47 127/46 123/41   BP Location: Right arm     Patient Position: Sitting     Pulse: 79 67 65   Resp: 16     Temp: 97.6 °F (36.4 °C)     TempSrc: Oral     SpO2: 93% 95% 95%   Weight: 39 kg (86 lb)     Height: 152.4 cm (60\")         Physical " Exam  General : Patient is awake, elderly, not acutely ill or toxic appearing  HEENT: Pupils are equally round, EOMI, conjunctivae clear, poor dentition  Neck: Neck is supple, full range of motion, trachea midline  Cardiac: Heart regular rate, rhythm, no murmurs, rubs, or gallops  Lungs: Lungs are clear to auscultation, there is no wheezing, rhonchi, or rales. There is no use of accessory muscles  Abdomen: Abdomen is soft, nontender, nondistended. There are no firm or pulsatile masses, no rebound rigidity or guarding  Musculoskeletal: Generalized muscle atrophy no focal muscle deficits are appreciated  Dermatology: Skin is warm and dry        DIAGNOSTIC RESULTS     EKG:  All EKGs are interpreted by the Emergency Department Physician who either signs or Co-signs this chart in the absence of a cardiologist.    ECG 12 Lead ED Triage Standing Order; Weak / Dizzy / AMS   Final Result   Test Reason : ED Triage Standing Order~   Blood Pressure :   */*   mmHG   Vent. Rate :  72 BPM     Atrial Rate :  72 BPM      P-R Int : 144 ms          QRS Dur : 112 ms       QT Int : 412 ms       P-R-T Axes :  52  -6  85 degrees     QTcB Int : 451 ms      Normal sinus rhythm with sinus arrhythmia   Minimal voltage criteria for LVH, may be normal variant   Nonspecific ST and T wave abnormality   Abnormal ECG   When compared with ECG of 29-Aug-2021 02:04,   premature ventricular complexes are no longer present   Incomplete right bundle branch block is no longer present   QT has shortened   Confirmed by MARY BOWIE MD ([id number]) on 1/24/2025 11:56:24 AM      Referred By: EDMD           Confirmed By: MARY BOWIE MD          RADIOLOGY:     [x] Radiologist's Report Reviewed:  XR Chest 1 View   Final Result   Impression:   Some suspected left lower lobe airspace disease of the costophrenic angle with potential trace effusion.         Electronically Signed: Adriana Asencio MD     1/24/2025 12:22 PM EST     Workstation ID: [id number]          I  ordered and independently reviewed the above noted radiographic studies.      I viewed images of chest x-ray which showed possible left lobe airspace disease/effusion per my independent interpretation.    See radiologist's dictation for official interpretation.        LABS:    I have reviewed and interpreted all of the currently available lab results from this visit (if applicable):  Results for orders placed or performed during the hospital encounter of 01/24/25   Comprehensive Metabolic Panel    Collection Time: 01/24/25 11:53 AM    Specimen: Blood   Result Value Ref Range    Glucose 106 (H) 65 - 99 mg/dL    BUN 60 (H) 8 - 23 mg/dL    Creatinine 1.39 (H) 0.57 - 1.00 mg/dL    Sodium 139 136 - 145 mmol/L    Potassium 4.8 3.5 - 5.2 mmol/L    Chloride 94 (L) 98 - 107 mmol/L    CO2 25.0 22.0 - 29.0 mmol/L    Calcium 13.2 (C) 8.2 - 9.6 mg/dL    Total Protein 7.5 6.0 - 8.5 g/dL    Albumin 4.2 3.5 - 5.2 g/dL    ALT (SGPT) 9 1 - 33 U/L    AST (SGOT) 17 1 - 32 U/L    Alkaline Phosphatase 71 39 - 117 U/L    Total Bilirubin 0.4 0.0 - 1.2 mg/dL    Globulin 3.3 gm/dL    A/G Ratio 1.3 g/dL    BUN/Creatinine Ratio 43.2 (H) 7.0 - 25.0    Anion Gap 20.0 (H) 5.0 - 15.0 mmol/L    eGFR 35.7 (L) >60.0 mL/min/1.73   High Sensitivity Troponin T    Collection Time: 01/24/25 11:53 AM    Specimen: Blood   Result Value Ref Range    HS Troponin T 35 (H) <14 ng/L   Magnesium    Collection Time: 01/24/25 11:53 AM    Specimen: Blood   Result Value Ref Range    Magnesium 2.4 (H) 1.7 - 2.3 mg/dL   CBC Auto Differential    Collection Time: 01/24/25 11:53 AM    Specimen: Blood   Result Value Ref Range    WBC 10.76 3.40 - 10.80 10*3/mm3    RBC 4.88 3.77 - 5.28 10*6/mm3    Hemoglobin 15.1 12.0 - 15.9 g/dL    Hematocrit 46.0 34.0 - 46.6 %    MCV 94.3 79.0 - 97.0 fL    MCH 30.9 26.6 - 33.0 pg    MCHC 32.8 31.5 - 35.7 g/dL    RDW 14.0 12.3 - 15.4 %    RDW-SD 48.8 37.0 - 54.0 fl    MPV 12.2 (H) 6.0 - 12.0 fL    Platelets 246 140 - 450 10*3/mm3     Neutrophil % 78.4 (H) 42.7 - 76.0 %    Lymphocyte % 13.5 (L) 19.6 - 45.3 %    Monocyte % 6.7 5.0 - 12.0 %    Eosinophil % 0.3 0.3 - 6.2 %    Basophil % 0.6 0.0 - 1.5 %    Immature Grans % 0.5 0.0 - 0.5 %    Neutrophils, Absolute 8.45 (H) 1.70 - 7.00 10*3/mm3    Lymphocytes, Absolute 1.45 0.70 - 3.10 10*3/mm3    Monocytes, Absolute 0.72 0.10 - 0.90 10*3/mm3    Eosinophils, Absolute 0.03 0.00 - 0.40 10*3/mm3    Basophils, Absolute 0.06 0.00 - 0.20 10*3/mm3    Immature Grans, Absolute 0.05 0.00 - 0.05 10*3/mm3    nRBC 0.0 0.0 - 0.2 /100 WBC   Green Top (Gel)    Collection Time: 01/24/25 11:53 AM   Result Value Ref Range    Extra Tube Hold for add-ons.    Lavender Top    Collection Time: 01/24/25 11:53 AM   Result Value Ref Range    Extra Tube hold for add-on    Gold Top - SST    Collection Time: 01/24/25 11:53 AM   Result Value Ref Range    Extra Tube Hold for add-ons.    Gray Top    Collection Time: 01/24/25 11:53 AM   Result Value Ref Range    Extra Tube Hold for add-ons.    Light Blue Top    Collection Time: 01/24/25 11:53 AM   Result Value Ref Range    Extra Tube Hold for add-ons.    ECG 12 Lead ED Triage Standing Order; Weak / Dizzy / AMS    Collection Time: 01/24/25 11:55 AM   Result Value Ref Range    QT Interval 412 ms    QTC Interval 451 ms   COVID-19 and FLU A/B PCR, 1 HR TAT - Swab, Nasopharynx    Collection Time: 01/24/25 12:01 PM    Specimen: Nasopharynx; Swab   Result Value Ref Range    COVID19 Detected (C) Not Detected - Ref. Range    Influenza A PCR Not Detected Not Detected    Influenza B PCR Not Detected Not Detected   Urinalysis With Microscopic If Indicated (No Culture) - Straight Cath    Collection Time: 01/24/25 12:22 PM    Specimen: Straight Cath; Urine   Result Value Ref Range    Color, UA Yellow Yellow, Straw    Appearance, UA Turbid (A) Clear    pH, UA 5.5 5.0 - 8.0    Specific Gravity, UA 1.017 1.001 - 1.030    Glucose, UA Negative Negative    Ketones, UA 15 mg/dL (1+) (A) Negative     Bilirubin, UA Negative Negative    Blood, UA Moderate (2+) (A) Negative    Protein, UA 30 mg/dL (1+) (A) Negative    Leuk Esterase, UA Large (3+) (A) Negative    Nitrite, UA Negative Negative    Urobilinogen, UA 0.2 E.U./dL 0.2 - 1.0 E.U./dL   Urinalysis, Microscopic Only - Straight Cath    Collection Time: 01/24/25 12:22 PM    Specimen: Straight Cath; Urine   Result Value Ref Range    RBC, UA 3-5 (A) None Seen, 0-2 /HPF    WBC, UA Too Numerous to Count (A) None Seen, 0-2 /HPF    Bacteria, UA 4+ (A) None Seen, Trace /HPF    Squamous Epithelial Cells, UA 7-12 (A) None Seen, 0-2 /HPF    Hyaline Casts, UA 0-6 0 - 6 /LPF    Methodology Manual Light Microscopy    POC Glucose Once    Collection Time: 01/24/25 12:24 PM    Specimen: Blood   Result Value Ref Range    Glucose 93 70 - 130 mg/dL        If labs were ordered, I independently reviewed the results and considered them in treating the patient.      EMERGENCY DEPARTMENT COURSE and DIFFERENTIAL DIAGNOSIS/MDM:   Vitals:  AS OF 13:23 EST    BP - 123/41  HR - 65  TEMP - 97.6 °F (36.4 °C) (Oral)  O2 SATS - 95%      Orders placed during this visit:  Orders Placed This Encounter   Procedures    COVID PRE-OP / PRE-PROCEDURE SCREENING ORDER (NO ISOLATION) - Swab, Nasopharynx    COVID-19 and FLU A/B PCR, 1 HR TAT - Swab, Nasopharynx    XR Chest 1 View    [place] Draw    Comprehensive Metabolic Panel    High Sensitivity Troponin T    Magnesium    CBC Auto Differential    Urinalysis With Microscopic If Indicated (No Culture) - Straight Cath    High Sensitivity Troponin T 1Hr    Urinalysis, Microscopic Only - Urine, Clean Catch    NPO Diet NPO Type: Strict NPO    Undress & Gown    Continuous Pulse Oximetry    Vital Signs    Straight cath    Oxygen Therapy- Nasal Cannula; Titrate 1-6 LPM Per SpO2; 90 - 95%    POC Glucose Once    POC Glucose Once    ECG 12 Lead ED Triage Standing Order; Weak / Dizzy / AMS    Insert Peripheral IV    Fall Precautions    CBC & Differential    Green Top  (Gel)    Lavender Top    Gold Top - SST    Gray Top    Light Blue Top       All labs have been independently reviewed by me.  All radiology studies have been reviewed by me and the radiologist dictating the report.  All EKG's have been independently viewed and interpreted by me.      Discussion below represents my analysis of pertinent findings related to patient's condition, differential diagnosis, treatment plan and final disposition.    Differential diagnosis:  The differential diagnosis associated with the patient's presentation includes: Generalized weakness, failure thrive adult, dehydration, electrolyte normalities, recent viral infection, UTI    Additional sources  Discussed/ obtained information from independent historians:   [] Spouse  [] Parent  [] Family member  [] Friend  [x] EMS   [] Other:    External (non-ED) record review:   [] Inpatient record:   [] Office record:   [] Outpatient record:   [] Prior Outpatient labs:   [] Prior Outpatient radiology:   [] Primary Care record:   [] Outside ED record:   [] Other:     Patient's care impacted by:   [] Diabetes  [] Hypertension  [] CHF  [] Hyperlipidemia  [] Coronary Artery Disease   [] COPD   [] Cancer   [] Tobacco Abuse   [] Substance Abuse    [] Other:     Care significantly affected by Social Determinants of Health (housing and economic circumstances, unemployment)    [] Yes     [x] No   If yes, Patient's care significantly limited by Social Determinants of Health including:   [] Inadequate housing   [] Low income   [] Alcoholism and drug addiction in family   [] Problems related to primary support group   [] Unemployment   [] Problems related to employment   [] Other Social Determinants of Health:       MEDICATIONS ADMINISTERED IN ED:  Medications   sodium chloride 0.9 % flush 10 mL (has no administration in time range)   sodium chloride 0.9 % bolus 500 mL (has no administration in time range)              This is a pleasant 89-year-old female who has  been generally weak, present for assessment possible UTI, recent viral infection, recent COVID infection.  She is without acute complaints, exhibiting frustration on why she is here to begin with.  Patient is afebrile, not requiring supplemental oxygen.  Blood work labs imaging and urinalysis obtained with results as above.  Urinalysis with TNTC WBCs with 4+ bacteria, large leukocytes.  She is also positive COVID-19, she started on IV antibiotics.  She has mild renal insufficiency creatinine 1.4 with a BUN of 60, calcium of 13.2.  Likely dehydrated, she is also on ergocalciferol which we will hold, start IV fluids, IV antibiotics.  White count is 10.7 with stable H&H.  At this point given her weakness, UTI, dehydration we will continue with IV fluids, plan for admission for electrolyte assessment, UTI treatment.  Case discussed with hospitalist Dr. Kwan.        PROCEDURES:  Procedures    CRITICAL CARE TIME    Total Critical Care time was 0 minutes, excluding separately reportable procedures.   There was a high probability of clinically significant/life threatening deterioration in the patient's condition which required my urgent intervention.      FINAL IMPRESSION      1. Urinary tract infection without hematuria, site unspecified    2. Generalized weakness    3. Renal insufficiency    4. Dehydration    5. Hypercalcemia    6. COVID-19 virus infection          DISPOSITION/PLAN     ED Disposition       ED Disposition   Decision to Admit    Condition   --    Comment   --               Comment: Please note this report has been produced using speech recognition software.      Cr Cavanaugh DO  Attending Emergency Physician         Cr Cavanaugh DO  01/24/25 [id number]

## 2025-01-24 NOTE — LETTER
EMS Transport Request  For use at [facility], [place], [place], [place], and [place] only   Patient Name: Laureen Nettles : 1932   Weight:39 kg (86 lb) Pick-up Location: [facility] BLS/ALS: BLS/ALS: BLS   Insurance: [employer] MEDICARE REPLACEMENT Auth End Date:    Pre-Cert #: D/C Summary complete:    Destination: Other [facility]   Contact Precautions: Droplet/Spore COVID + will be out of 10 day isolation Monday   Equipment (O2, Fluids, etc.): None   Arrive By Date/Time: Monday 2-3-25 11-1 afternoon Stretcher/WC: Stretcher   CM Requesting: Christine Bennett RN Ext: [phone]   Notes/Medical Necessity: weakness, Hard of Hearing, staff using a lift in the room      ______________________________________________________________________    *Only 2 patient bags OR 1 carry-on size bag are permitted.  Wheelchairs and walkers CANNOT transported with the patient. Acknowledge: Yes

## 2025-01-25 LAB
ANION GAP SERPL CALCULATED.3IONS-SCNC: 6 MMOL/L (ref 5–15)
BACTERIA SPEC AEROBE CULT: NORMAL
BASOPHILS # BLD AUTO: 0.04 10*3/MM3 (ref 0–0.2)
BASOPHILS NFR BLD AUTO: 0.3 % (ref 0–1.5)
BUN SERPL-MCNC: 48 MG/DL (ref 8–23)
BUN/CREAT SERPL: 30.8 (ref 7–25)
CA-I SERPL ISE-MCNC: 1.37 MMOL/L (ref 1.15–1.3)
CALCIUM SPEC-SCNC: 11 MG/DL (ref 8.2–9.6)
CHLORIDE SERPL-SCNC: 104 MMOL/L (ref 98–107)
CO2 SERPL-SCNC: 31 MMOL/L (ref 22–29)
CREAT SERPL-MCNC: 1.56 MG/DL (ref 0.57–1)
DEPRECATED RDW RBC AUTO: 49.1 FL (ref 37–54)
EGFRCR SERPLBLD CKD-EPI 2021: 31.1 ML/MIN/1.73
EOSINOPHIL # BLD AUTO: 0.03 10*3/MM3 (ref 0–0.4)
EOSINOPHIL NFR BLD AUTO: 0.2 % (ref 0.3–6.2)
ERYTHROCYTE [DISTWIDTH] IN BLOOD BY AUTOMATED COUNT: 14.1 % (ref 12.3–15.4)
GLUCOSE SERPL-MCNC: 138 MG/DL (ref 65–99)
HCT VFR BLD AUTO: 37.6 % (ref 34–46.6)
HGB BLD-MCNC: 12.1 G/DL (ref 12–15.9)
IMM GRANULOCYTES # BLD AUTO: 0.06 10*3/MM3 (ref 0–0.05)
IMM GRANULOCYTES NFR BLD AUTO: 0.5 % (ref 0–0.5)
LYMPHOCYTES # BLD AUTO: 1.42 10*3/MM3 (ref 0.7–3.1)
LYMPHOCYTES NFR BLD AUTO: 10.9 % (ref 19.6–45.3)
MAGNESIUM SERPL-MCNC: 1.9 MG/DL (ref 1.7–2.3)
MCH RBC QN AUTO: 30.6 PG (ref 26.6–33)
MCHC RBC AUTO-ENTMCNC: 32.2 G/DL (ref 31.5–35.7)
MCV RBC AUTO: 94.9 FL (ref 79–97)
MONOCYTES # BLD AUTO: 0.83 10*3/MM3 (ref 0.1–0.9)
MONOCYTES NFR BLD AUTO: 6.4 % (ref 5–12)
NEUTROPHILS NFR BLD AUTO: 10.6 10*3/MM3 (ref 1.7–7)
NEUTROPHILS NFR BLD AUTO: 81.7 % (ref 42.7–76)
NRBC BLD AUTO-RTO: 0 /100 WBC (ref 0–0.2)
PHOSPHATE SERPL-MCNC: 1.5 MG/DL (ref 2.5–4.5)
PLATELET # BLD AUTO: 215 10*3/MM3 (ref 140–450)
PMV BLD AUTO: 12.3 FL (ref 6–12)
POTASSIUM SERPL-SCNC: 3.9 MMOL/L (ref 3.5–5.2)
RBC # BLD AUTO: 3.96 10*6/MM3 (ref 3.77–5.28)
SODIUM SERPL-SCNC: 141 MMOL/L (ref 136–145)
TSH SERPL DL<=0.05 MIU/L-ACNC: 3.57 UIU/ML (ref 0.27–4.2)
WBC NRBC COR # BLD AUTO: 12.98 10*3/MM3 (ref 3.4–10.8)

## 2025-01-25 PROCEDURE — 99232 SBSQ HOSP IP/OBS MODERATE 35: CPT | Performed by: INTERNAL MEDICINE

## 2025-01-25 PROCEDURE — 82330 ASSAY OF CALCIUM: CPT | Performed by: INTERNAL MEDICINE

## 2025-01-25 PROCEDURE — 84100 ASSAY OF PHOSPHORUS: CPT | Performed by: INTERNAL MEDICINE

## 2025-01-25 PROCEDURE — 83735 ASSAY OF MAGNESIUM: CPT | Performed by: INTERNAL MEDICINE

## 2025-01-25 PROCEDURE — 25010000002 CEFAZOLIN PER 500 MG: Performed by: INTERNAL MEDICINE

## 2025-01-25 PROCEDURE — 84443 ASSAY THYROID STIM HORMONE: CPT | Performed by: INTERNAL MEDICINE

## 2025-01-25 PROCEDURE — 80048 BASIC METABOLIC PNL TOTAL CA: CPT | Performed by: INTERNAL MEDICINE

## 2025-01-25 PROCEDURE — 25810000003 SODIUM CHLORIDE 0.9 % SOLUTION 250 ML FLEX CONT: Performed by: INTERNAL MEDICINE

## 2025-01-25 PROCEDURE — G0378 HOSPITAL OBSERVATION PER HR: HCPCS

## 2025-01-25 PROCEDURE — 85025 COMPLETE CBC W/AUTO DIFF WBC: CPT | Performed by: INTERNAL MEDICINE

## 2025-01-25 RX ADMIN — Medication 10 ML: at 09:57

## 2025-01-25 RX ADMIN — LEVOTHYROXINE SODIUM 88 MCG: 0.09 TABLET ORAL at 05:14

## 2025-01-25 RX ADMIN — SODIUM PHOSPHATE, MONOBASIC, MONOHYDRATE AND SODIUM PHOSPHATE, DIBASIC, ANHYDROUS 15 MMOL: 142; 276 INJECTION, SOLUTION INTRAVENOUS at 21:27

## 2025-01-25 RX ADMIN — CEFAZOLIN 1000 MG: 1 INJECTION, POWDER, FOR SOLUTION INTRAMUSCULAR; INTRAVENOUS at 15:05

## 2025-01-25 RX ADMIN — Medication 10 ML: at 21:27

## 2025-01-25 RX ADMIN — APIXABAN 2.5 MG: 2.5 TABLET, FILM COATED ORAL at 21:27

## 2025-01-25 RX ADMIN — CEFAZOLIN 1000 MG: 1 INJECTION, POWDER, FOR SOLUTION INTRAMUSCULAR; INTRAVENOUS at 05:14

## 2025-01-25 RX ADMIN — SODIUM PHOSPHATE, MONOBASIC, MONOHYDRATE AND SODIUM PHOSPHATE, DIBASIC, ANHYDROUS 15 MMOL: 142; 276 INJECTION, SOLUTION INTRAVENOUS at 16:06

## 2025-01-25 RX ADMIN — APIXABAN 2.5 MG: 2.5 TABLET, FILM COATED ORAL at 09:55

## 2025-01-25 NOTE — PROGRESS NOTES
"    [facility]  PROGRESS NOTE    Patient Name: Laureen Nettles  : 1932  MRN: [id number]    Date of Admission: 2025  Primary Care Physician: Criselda Padgett MD    Subjective   Subjective     CC:  altered mental status     HPI:  Dozing, roused, \"I'm fine\".  Didn't answer followup questions.  [place].       Objective   Objective     Vital Signs:   Temp:  [97 °F (36.1 °C)-97.6 °F (36.4 °C)] 97.4 °F (36.3 °C)  Heart Rate:  [62-95] 95  Resp:  [16-20] 18  BP: (104-137)/(40-59) 137/59     Physical Exam:  Gen:  thin sleeping in bed, opens eyes to voice, said a few words, closed eyes ago.  NAD   Neuro: somnolent [place] nonfocal   HEENT:  NC/AT   Neck:  Supple, no LAD  Heart RRR   Abd:  Soft, nontender,  Extrem:  No c/c/e      Results Reviewed:  LAB RESULTS:      Lab 25  1310 25  1153   WBC  --  10.76   HEMOGLOBIN  --  15.1   HEMATOCRIT  --  46.0   PLATELETS  --  246   NEUTROS ABS  --  8.45*   IMMATURE GRANS (ABS)  --  0.05   LYMPHS ABS  --  1.45   MONOS ABS  --  0.72   EOS ABS  --  0.03   MCV  --  94.3   PROCALCITONIN 0.24  --    HSTROP T 35* 35*         Lab 25  1153   SODIUM 139   POTASSIUM 4.8   CHLORIDE 94*   CO2 25.0   ANION GAP 20.0*   BUN 60*   CREATININE 1.39*   EGFR 35.7*   GLUCOSE 106*   CALCIUM 13.2*   MAGNESIUM 2.4*         Lab 25  1153   TOTAL PROTEIN 7.5   ALBUMIN 4.2   GLOBULIN 3.3   ALT (SGPT) 9   AST (SGOT) 17   BILIRUBIN 0.4   ALK PHOS 71         Lab 25  1310 25  1153   HSTROP T 35* 35*                 Brief Urine Lab Results  (Last result in the past 365 days)        Color   Clarity   Blood   Leuk Est   Nitrite   Protein   CREAT   Urine HCG        25 1222 Yellow   Turbid   Moderate (2+)   Large (3+)   Negative   30 mg/dL (1+)                   Microbiology Results Abnormal       Procedure Component Value - Date/Time    COVID PRE-OP / PRE-PROCEDURE SCREENING ORDER (NO ISOLATION) - Swab, Nasopharynx [[id number]]  " (Abnormal) Collected: 01/24/25 1201    Lab Status: Final result Specimen: Swab from Nasopharynx Updated: 01/24/25 1256    Narrative:      The following orders were created for panel order COVID PRE-OP / PRE-PROCEDURE SCREENING ORDER (NO ISOLATION) - Swab, Nasopharynx.  Procedure                               Abnormality         Status                     ---------                               -----------         ------                     COVID-19 and FLU A/B PCR...[[id number]]  Abnormal            Final result                 Please view results for these tests on the individual orders.    COVID-19 and FLU A/B PCR, 1 HR TAT - Swab, Nasopharynx [[id number]]  (Abnormal) Collected: 01/24/25 1201    Lab Status: Final result Specimen: Swab from Nasopharynx Updated: 01/24/25 1256     COVID19 Detected     Influenza A PCR Not Detected     Influenza B PCR Not Detected    Narrative:      Fact sheet for providers: https://www.fda.gov/media/034688/download    Fact sheet for patients: https://www.fda.gov/media/796606/download    Test performed by PCR.  Influenza A and Influenza B negative results should be considered presumptive in samples that have a positive SARS-CoV-2 result.    Competitive inhibition studies showed that SARS-CoV-2 virus, when present at concentrations above 3.6E+04 copies/mL, can inhibit the detection and amplification of influenza A and influenza B virus RNA if present at or below 1.8E+02 copies/mL or 4.9E+02 copies/mL, respectively, and may lead to false negative influenza virus results. If co-infection with influenza A or influenza B virus is suspected in samples with a positive SARS-CoV-2 result, the sample should be re-tested with another FDA cleared, approved, or authorized influenza test, if influenza virus detection would change clinical management.            XR Chest 1 View    Result Date: 1/24/2025  XR CHEST 1 VW Date of Exam: 1/24/2025 11:49 AM EST Indication: Weak/Dizzy/AMS triage protocol  Comparison: Chest x-ray 8/29/2021 Findings: The heart is stable in size. There is some left lower lobe airspace disease at the costophrenic angle and suspected underlying pleural effusion. No evidence for pneumothorax. Cardiac pacemaker is noted.     Impression: Impression: Some suspected left lower lobe airspace disease of the costophrenic angle with potential trace effusion. Electronically Signed: Adriana Asencio MD  1/24/2025 12:22 PM EST  Workstation ID: [id number]     Results for orders placed during the hospital encounter of 08/29/21    Adult Transthoracic Echo Complete W/ Cont if Necessary Per Protocol    Interpretation Summary  · Estimated left ventricular EF = 30-35%. Moderate to severe LV systolic dysfunction.  · Left ventricular wall thickness is consistent with mild concentric hypertrophy.  · Left ventricular diastolic function is consistent with (grade II w/high LAP) pseudonormalization.  · Severe aortic valve regurgitation is present.  · Moderate to severe mitral valve regurgitation is present.  · Mild tricuspid valve regurgitation is present  · Estimated right ventricular systolic pressure from tricuspid regurgitation is normal (<35 mmHg).      Current medications:  Scheduled Meds:apixaban, 2.5 mg, Oral, BID  ceFAZolin, 1,000 mg, Intravenous, Q12H  [Held by provider] furosemide, 20 mg, Oral, Daily  levothyroxine, 88 mcg, Oral, Q AM  metoprolol succinate XL, 25 mg, Oral, Daily  sodium chloride, 10 mL, Intravenous, Q12H  [Held by provider] spironolactone, 25 mg, Oral, Daily      Continuous Infusions:sodium chloride, 100 mL/hr, Last Rate: 100 mL/hr (01/24/25 6036)      PRN Meds:.  acetaminophen    senna-docusate sodium **AND** polyethylene glycol **AND** bisacodyl **AND** bisacodyl    Calcium Replacement - Follow Nurse / BPA Driven Protocol    Magnesium Standard Dose Replacement - Follow Nurse / BPA Driven Protocol    nitroglycerin    ondansetron    Phosphorus Replacement - Follow Nurse / BPA Driven  Protocol    Potassium Replacement - Follow Nurse / BPA Driven Protocol    sodium chloride    sodium chloride    sodium chloride    Assessment & Plan   Assessment & Plan     Active Hospital Problems    Diagnosis  POA    **UTI (urinary tract infection) [N39.0]  Yes      Resolved Hospital Problems   No resolved problems to display.        Brief Hospital Course to date:  Laureen Nettles is a 89 y.o. female with history of hypothyroidism and paroxysmal A-fib who lives at [place] and sent for lethargy, mental status changes.  Labs significant for hypercalcemia and mild MEENAKSHI.      Mental status changes  - hypercalcemia, likely w  dehydration contributing   - 2000ml infused.  Stop for now   - recent COVID, ? L basilar infiltrate.  Cefazolin will cover most organisms and the infiltrate is likely from COVID not bacterial, or is noninfectious.   - meds reviewed:  no sedatives prescribed  - supplements stopped, diuretics stopped  , consider if diuretics should be stopped for good     COVID +  - diagnosed on 1/21/25  - denies symptoms.  On room air.      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     VTE Prophylaxis:  Pharmacologic & mechanical VTE prophylaxis orders are present.         AM-PAC 6 Clicks Score (PT): 18 (01/25/25 0600)    CODE STATUS:   Code Status and Medical Interventions: CPR (Attempt to Resuscitate); Full Support   Ordered at: 01/24/25 1345     Level Of Support Discussed With:    Patient     Code Status (Patient has no pulse and is not breathing):    CPR (Attempt to Resuscitate)     Medical Interventions (Patient has pulse  or is breathing):    Full Support       Maira Bates MD  01/25/25

## 2025-01-25 NOTE — PLAN OF CARE
Problem: Adult Inpatient Plan of Care  Goal: Absence of Hospital-Acquired Illness or Injury  Intervention: Identify and Manage Fall Risk  Recent Flowsheet Documentation  Taken 1/25/2025 0600 by Logan Cornejo, RN  Safety Promotion/Fall Prevention:   toileting scheduled   safety round/check completed   room organization consistent   nonskid shoes/slippers when out of bed   muscle strengthening facilitated   mobility aid in reach   lighting adjusted   gait belt   fall prevention program maintained   clutter free environment maintained   assistive device/personal items within reach   activity supervised  Taken 1/25/2025 0400 by Logan Cornejo, RN  Safety Promotion/Fall Prevention:   toileting scheduled   safety round/check completed   room organization consistent   nonskid shoes/slippers when out of bed   muscle strengthening facilitated   mobility aid in reach   lighting adjusted   gait belt   fall prevention program maintained   clutter free environment maintained   assistive device/personal items within reach   activity supervised  Taken 1/25/2025 0200 by Logan Cornejo, RN  Safety Promotion/Fall Prevention:   toileting scheduled   safety round/check completed   room organization consistent   nonskid shoes/slippers when out of bed   muscle strengthening facilitated   mobility aid in reach   lighting adjusted   gait belt   fall prevention program maintained   clutter free environment maintained   assistive device/personal items within reach   activity supervised  Taken 1/25/2025 0000 by Logan Cornejo, RN  Safety Promotion/Fall Prevention:   toileting scheduled   safety round/check completed   room organization consistent   nonskid shoes/slippers when out of bed   muscle strengthening facilitated   mobility aid in reach   lighting adjusted   gait belt   fall prevention program maintained   clutter free environment maintained   assistive device/personal items within reach   activity supervised  Taken 1/24/2025 2200  by Cornejo, Logan, RN  Safety Promotion/Fall Prevention:   toileting scheduled   safety round/check completed   room organization consistent   nonskid shoes/slippers when out of bed   muscle strengthening facilitated   mobility aid in reach   lighting adjusted   gait belt   fall prevention program maintained   clutter free environment maintained   assistive device/personal items within reach   activity supervised  Taken 1/24/2025 2000 by Logan Cornejo RN  Safety Promotion/Fall Prevention:   toileting scheduled   safety round/check completed   room organization consistent   nonskid shoes/slippers when out of bed   muscle strengthening facilitated   mobility aid in reach   lighting adjusted   gait belt   fall prevention program maintained   clutter free environment maintained   assistive device/personal items within reach   activity supervised  Intervention: Prevent Skin Injury  Recent Flowsheet Documentation  Taken 1/25/2025 0600 by Logan Cornejo RN  Body Position:   30 degrees lateral   position changed independently  Skin Protection: incontinence pads utilized  Taken 1/25/2025 0400 by Logan Cornejo RN  Body Position:   30 degrees lateral   position changed independently  Skin Protection: incontinence pads utilized  Taken 1/25/2025 0200 by Logan Cornejo RN  Body Position:   30 degrees lateral   position changed independently  Skin Protection: incontinence pads utilized  Taken 1/25/2025 0000 by Logan Cornejo RN  Body Position:   30 degrees lateral   position changed independently  Skin Protection: incontinence pads utilized  Taken 1/24/2025 2200 by Logan Cornejo RN  Body Position:   30 degrees lateral   position changed independently  Skin Protection: incontinence pads utilized  Taken 1/24/2025 2000 by Logan Cornejo RN  Body Position:   30 degrees lateral   position changed independently  Skin Protection: incontinence pads utilized  Intervention: Prevent Infection  Recent Flowsheet Documentation  Taken  1/25/2025 0600 by Logan Cornejo RN  Infection Prevention:   cohorting utilized   equipment surfaces disinfected   hand hygiene promoted   rest/sleep promoted   personal protective equipment utilized   single patient room provided   visitors restricted/screened  Taken 1/25/2025 0400 by Logan Cornejo RN  Infection Prevention:   cohorting utilized   equipment surfaces disinfected   hand hygiene promoted   rest/sleep promoted   personal protective equipment utilized   single patient room provided   visitors restricted/screened  Taken 1/25/2025 0200 by Logan Cornejo RN  Infection Prevention:   cohorting utilized   equipment surfaces disinfected   hand hygiene promoted   rest/sleep promoted   personal protective equipment utilized   single patient room provided   visitors restricted/screened  Taken 1/25/2025 0000 by Logan Cornejo RN  Infection Prevention:   cohorting utilized   equipment surfaces disinfected   hand hygiene promoted   rest/sleep promoted   personal protective equipment utilized   single patient room provided   visitors restricted/screened  Taken 1/24/2025 2200 by Logan Cornejo RN  Infection Prevention:   cohorting utilized   equipment surfaces disinfected   hand hygiene promoted   rest/sleep promoted   personal protective equipment utilized   single patient room provided   visitors restricted/screened  Taken 1/24/2025 2000 by Logan Cornejo RN  Infection Prevention:   cohorting utilized   equipment surfaces disinfected   hand hygiene promoted   rest/sleep promoted   personal protective equipment utilized   single patient room provided   visitors restricted/screened  Goal: Optimal Comfort and Wellbeing  Intervention: Provide Person-Centered Care  Recent Flowsheet Documentation  Taken 1/25/2025 0600 by Logan Cornejo RN  Trust Relationship/Rapport:   care explained   choices provided   emotional support provided   empathic listening provided   questions answered   questions encouraged    reassurance provided   thoughts/feelings acknowledged  Taken 1/25/2025 0400 by Logan Corneoj RN  Trust Relationship/Rapport:   care explained   choices provided   emotional support provided   empathic listening provided   questions answered   questions encouraged   reassurance provided   thoughts/feelings acknowledged  Taken 1/25/2025 0200 by Logan Cornejo RN  Trust Relationship/Rapport:   care explained   choices provided   emotional support provided   empathic listening provided   questions answered   questions encouraged   reassurance provided   thoughts/feelings acknowledged  Taken 1/25/2025 0000 by Logan Cornejo RN  Trust Relationship/Rapport:   care explained   choices provided   emotional support provided   empathic listening provided   questions answered   questions encouraged   reassurance provided   thoughts/feelings acknowledged  Taken 1/24/2025 2200 by Logan Cornejo RN  Trust Relationship/Rapport:   care explained   choices provided   emotional support provided   empathic listening provided   questions answered   questions encouraged   reassurance provided   thoughts/feelings acknowledged  Taken 1/24/2025 2000 by Logan Cornejo RN  Trust Relationship/Rapport:   care explained   choices provided   emotional support provided   empathic listening provided   questions answered   questions encouraged   reassurance provided   thoughts/feelings acknowledged     Problem: Skin Injury Risk Increased  Goal: Skin Health and Integrity  Intervention: Optimize Skin Protection  Recent Flowsheet Documentation  Taken 1/25/2025 0600 by Logan Cornejo RN  Activity Management: activity encouraged  Pressure Reduction Techniques: weight shift assistance provided  Head of Bed (HOB) Positioning:   HOB elevated   HOB at 20-30 degrees  Pressure Reduction Devices: pressure-redistributing mattress utilized  Skin Protection: incontinence pads utilized  Taken 1/25/2025 0400 by Logan Cornejo, RN  Activity Management:  activity encouraged  Pressure Reduction Techniques: weight shift assistance provided  Head of Bed (HOB) Positioning:   HOB elevated   HOB at 20-30 degrees  Pressure Reduction Devices: pressure-redistributing mattress utilized  Skin Protection: incontinence pads utilized  Taken 1/25/2025 0200 by Logan Cornejo RN  Activity Management: activity encouraged  Pressure Reduction Techniques: weight shift assistance provided  Head of Bed (HOB) Positioning:   HOB elevated   HOB at 20-30 degrees  Pressure Reduction Devices: pressure-redistributing mattress utilized  Skin Protection: incontinence pads utilized  Taken 1/25/2025 0000 by Logan Cornejo RN  Activity Management: activity encouraged  Pressure Reduction Techniques: weight shift assistance provided  Head of Bed (HOB) Positioning:   HOB elevated   HOB at 20-30 degrees  Pressure Reduction Devices: pressure-redistributing mattress utilized  Skin Protection: incontinence pads utilized  Taken 1/24/2025 2200 by Logan Cornejo RN  Activity Management: activity encouraged  Pressure Reduction Techniques: weight shift assistance provided  Head of Bed (HOB) Positioning:   HOB elevated   HOB at 20-30 degrees  Pressure Reduction Devices: pressure-redistributing mattress utilized  Skin Protection: incontinence pads utilized  Taken 1/24/2025 2000 by Logan Cornejo RN  Activity Management: activity encouraged  Pressure Reduction Techniques: weight shift assistance provided  Head of Bed (HOB) Positioning:   HOB elevated   HOB at 20-30 degrees  Pressure Reduction Devices: pressure-redistributing mattress utilized  Skin Protection: incontinence pads utilized   Goal Outcome Evaluation:

## 2025-01-26 PROBLEM — E86.0 DEHYDRATION: Status: ACTIVE | Noted: 2025-01-26

## 2025-01-26 LAB
ANION GAP SERPL CALCULATED.3IONS-SCNC: 9 MMOL/L (ref 5–15)
BUN SERPL-MCNC: 45 MG/DL (ref 8–23)
BUN/CREAT SERPL: 44.1 (ref 7–25)
CALCIUM SPEC-SCNC: 9.5 MG/DL (ref 8.2–9.6)
CHLORIDE SERPL-SCNC: 106 MMOL/L (ref 98–107)
CO2 SERPL-SCNC: 26 MMOL/L (ref 22–29)
CREAT SERPL-MCNC: 1.02 MG/DL (ref 0.57–1)
EGFRCR SERPLBLD CKD-EPI 2021: 51.7 ML/MIN/1.73
GLUCOSE SERPL-MCNC: 110 MG/DL (ref 65–99)
PHOSPHATE SERPL-MCNC: 4.2 MG/DL (ref 2.5–4.5)
POTASSIUM SERPL-SCNC: 3.5 MMOL/L (ref 3.5–5.2)
SODIUM SERPL-SCNC: 141 MMOL/L (ref 136–145)

## 2025-01-26 PROCEDURE — 84100 ASSAY OF PHOSPHORUS: CPT | Performed by: INTERNAL MEDICINE

## 2025-01-26 PROCEDURE — 99232 SBSQ HOSP IP/OBS MODERATE 35: CPT | Performed by: INTERNAL MEDICINE

## 2025-01-26 PROCEDURE — 97162 PT EVAL MOD COMPLEX 30 MIN: CPT

## 2025-01-26 PROCEDURE — 97530 THERAPEUTIC ACTIVITIES: CPT

## 2025-01-26 PROCEDURE — 97165 OT EVAL LOW COMPLEX 30 MIN: CPT

## 2025-01-26 PROCEDURE — 80048 BASIC METABOLIC PNL TOTAL CA: CPT | Performed by: INTERNAL MEDICINE

## 2025-01-26 PROCEDURE — 25010000002 CEFAZOLIN PER 500 MG: Performed by: INTERNAL MEDICINE

## 2025-01-26 RX ADMIN — CEFAZOLIN 1000 MG: 1 INJECTION, POWDER, FOR SOLUTION INTRAMUSCULAR; INTRAVENOUS at 15:49

## 2025-01-26 RX ADMIN — LEVOTHYROXINE SODIUM 88 MCG: 0.09 TABLET ORAL at 04:38

## 2025-01-26 RX ADMIN — CEFAZOLIN 1000 MG: 1 INJECTION, POWDER, FOR SOLUTION INTRAMUSCULAR; INTRAVENOUS at 04:38

## 2025-01-26 RX ADMIN — METOPROLOL SUCCINATE 25 MG: 25 TABLET, EXTENDED RELEASE ORAL at 09:28

## 2025-01-26 RX ADMIN — ACETAMINOPHEN 650 MG: 325 TABLET ORAL at 00:21

## 2025-01-26 RX ADMIN — Medication 10 ML: at 15:49

## 2025-01-26 RX ADMIN — Medication 10 ML: at 21:24

## 2025-01-26 RX ADMIN — APIXABAN 2.5 MG: 2.5 TABLET, FILM COATED ORAL at 09:28

## 2025-01-26 RX ADMIN — APIXABAN 2.5 MG: 2.5 TABLET, FILM COATED ORAL at 21:23

## 2025-01-26 NOTE — PLAN OF CARE
Problem: Adult Inpatient Plan of Care  Goal: Absence of Hospital-Acquired Illness or Injury  Intervention: Identify and Manage Fall Risk  Recent Flowsheet Documentation  Taken 1/26/2025 0600 by Logan Cornejo, RN  Safety Promotion/Fall Prevention:   toileting scheduled   safety round/check completed   room organization consistent   nonskid shoes/slippers when out of bed   muscle strengthening facilitated   mobility aid in reach   lighting adjusted   gait belt   fall prevention program maintained   clutter free environment maintained   assistive device/personal items within reach   activity supervised  Taken 1/26/2025 0400 by Logan Cornejo, RN  Safety Promotion/Fall Prevention:   toileting scheduled   safety round/check completed   room organization consistent   nonskid shoes/slippers when out of bed   muscle strengthening facilitated   mobility aid in reach   lighting adjusted   gait belt   fall prevention program maintained   clutter free environment maintained   assistive device/personal items within reach   activity supervised  Taken 1/26/2025 0200 by Logan Cornejo, RN  Safety Promotion/Fall Prevention:   toileting scheduled   safety round/check completed   room organization consistent   nonskid shoes/slippers when out of bed   muscle strengthening facilitated   mobility aid in reach   lighting adjusted   gait belt   fall prevention program maintained   clutter free environment maintained   assistive device/personal items within reach   activity supervised  Taken 1/26/2025 0000 by Logan Cornejo, RN  Safety Promotion/Fall Prevention:   toileting scheduled   safety round/check completed   room organization consistent   nonskid shoes/slippers when out of bed   muscle strengthening facilitated   mobility aid in reach   lighting adjusted   gait belt   fall prevention program maintained   clutter free environment maintained   assistive device/personal items within reach   activity supervised  Taken 1/25/2025 2130  by Cornejo, Logan, RN  Safety Promotion/Fall Prevention:   toileting scheduled   safety round/check completed   room organization consistent   nonskid shoes/slippers when out of bed   muscle strengthening facilitated   mobility aid in reach   lighting adjusted   gait belt   fall prevention program maintained   clutter free environment maintained   assistive device/personal items within reach   activity supervised  Intervention: Prevent Skin Injury  Recent Flowsheet Documentation  Taken 1/26/2025 0600 by Logan Cornejo RN  Body Position:   30 degrees lateral   position changed independently  Skin Protection: incontinence pads utilized  Taken 1/26/2025 0400 by Logan Cornejo RN  Body Position:   30 degrees lateral   position changed independently  Skin Protection: incontinence pads utilized  Taken 1/26/2025 0200 by Logan Cornejo RN  Body Position:   30 degrees lateral   position changed independently  Skin Protection: incontinence pads utilized  Taken 1/26/2025 0000 by Logan Cornejo RN  Body Position:   30 degrees lateral   position changed independently  Skin Protection: incontinence pads utilized  Taken 1/25/2025 2130 by Logan Cornejo RN  Body Position:   30 degrees lateral   position changed independently  Skin Protection: incontinence pads utilized  Intervention: Prevent Infection  Recent Flowsheet Documentation  Taken 1/26/2025 0600 by Logan Cornejo RN  Infection Prevention:   cohorting utilized   equipment surfaces disinfected   hand hygiene promoted   rest/sleep promoted   personal protective equipment utilized   single patient room provided   visitors restricted/screened  Taken 1/26/2025 0400 by Logan Cornejo RN  Infection Prevention:   cohorting utilized   equipment surfaces disinfected   hand hygiene promoted   rest/sleep promoted   personal protective equipment utilized   single patient room provided   visitors restricted/screened  Taken 1/26/2025 0200 by Logan Cornejo RN  Infection  Prevention:   cohorting utilized   equipment surfaces disinfected   hand hygiene promoted   rest/sleep promoted   personal protective equipment utilized   single patient room provided   visitors restricted/screened  Taken 1/26/2025 0000 by Logan Cornejo RN  Infection Prevention:   cohorting utilized   equipment surfaces disinfected   hand hygiene promoted   rest/sleep promoted   personal protective equipment utilized   single patient room provided   visitors restricted/screened  Taken 1/25/2025 2130 by Logan Cornejo, RN  Infection Prevention:   cohorting utilized   equipment surfaces disinfected   hand hygiene promoted   rest/sleep promoted   personal protective equipment utilized   single patient room provided   visitors restricted/screened  Goal: Optimal Comfort and Wellbeing  Intervention: Provide Person-Centered Care  Recent Flowsheet Documentation  Taken 1/26/2025 0600 by Logan Cornejo RN  Trust Relationship/Rapport:   care explained   choices provided   emotional support provided   empathic listening provided   questions answered   questions encouraged   reassurance provided   thoughts/feelings acknowledged  Taken 1/26/2025 0400 by Logan Cornejo RN  Trust Relationship/Rapport:   care explained   choices provided   emotional support provided   empathic listening provided   questions answered   questions encouraged   reassurance provided   thoughts/feelings acknowledged  Taken 1/26/2025 0200 by Logan Cornejo RN  Trust Relationship/Rapport:   care explained   choices provided   emotional support provided   empathic listening provided   questions answered   questions encouraged   reassurance provided   thoughts/feelings acknowledged  Taken 1/26/2025 0000 by Logan Cornejo RN  Trust Relationship/Rapport:   care explained   choices provided   emotional support provided   empathic listening provided   questions answered   questions encouraged   reassurance provided   thoughts/feelings acknowledged  Taken  1/25/2025 2130 by Logan Cornejo RN  Trust Relationship/Rapport:   care explained   choices provided   emotional support provided   empathic listening provided   questions answered   questions encouraged   reassurance provided   thoughts/feelings acknowledged     Problem: Skin Injury Risk Increased  Goal: Skin Health and Integrity  Intervention: Optimize Skin Protection  Recent Flowsheet Documentation  Taken 1/26/2025 0600 by Logan Cornejo RN  Activity Management: activity encouraged  Pressure Reduction Techniques: weight shift assistance provided  Head of Bed (HOB) Positioning:   HOB elevated   HOB at 20-30 degrees  Pressure Reduction Devices: pressure-redistributing mattress utilized  Skin Protection: incontinence pads utilized  Taken 1/26/2025 0400 by Logan Cornejo RN  Activity Management: activity encouraged  Pressure Reduction Techniques: weight shift assistance provided  Head of Bed (HOB) Positioning:   HOB elevated   HOB at 20-30 degrees  Pressure Reduction Devices: pressure-redistributing mattress utilized  Skin Protection: incontinence pads utilized  Taken 1/26/2025 0200 by Logan Cornejo RN  Activity Management: activity encouraged  Pressure Reduction Techniques: weight shift assistance provided  Head of Bed (HOB) Positioning:   HOB elevated   HOB at 20-30 degrees  Pressure Reduction Devices: pressure-redistributing mattress utilized  Skin Protection: incontinence pads utilized  Taken 1/26/2025 0000 by Logan Cornejo RN  Activity Management: activity encouraged  Pressure Reduction Techniques: weight shift assistance provided  Head of Bed (HOB) Positioning:   HOB elevated   HOB at 20-30 degrees  Pressure Reduction Devices: pressure-redistributing mattress utilized  Skin Protection: incontinence pads utilized  Taken 1/25/2025 2130 by Logan Cornejo RN  Activity Management: activity encouraged  Pressure Reduction Techniques: weight shift assistance provided  Head of Bed (HOB) Positioning:   HOB  elevated   HOB at 20-30 degrees  Pressure Reduction Devices: pressure-redistributing mattress utilized  Skin Protection: incontinence pads utilized   Goal Outcome Evaluation:

## 2025-01-26 NOTE — PROGRESS NOTES
"    [facility] Medicine Services  PROGRESS NOTE    Patient Name: Laureen Nettles  : 1932  MRN: [id number]    Date of Admission: 2025  Primary Care Physician: Criselda Padgett MD    Subjective   Subjective     CC:  altered mental status     HPI:  Up in chair , eating, also picking at her telemetry box - \"I think I broke this.\"        Objective   Objective     Vital Signs:   Temp:  [97.4 °F (36.3 °C)-97.9 °F (36.6 °C)] 97.4 °F (36.3 °C)  Heart Rate:  [] 82  Resp:  [16-18] 18  BP: (103-133)/(43-60) 107/49     Physical Exam:  Gen: Up in chair, brighter than yesterday, confused   Neuro: alert, pleasant   HEENT:  NC/AT   Neck:  Supple, no LAD  Heart RRR   Abd:  Soft, nontender,  Extrem:  No c/c/e      Results Reviewed:  LAB RESULTS:      Lab 25  1349 25  1310 25  1153   WBC 12.98*  --  10.76   HEMOGLOBIN 12.1  --  15.1   HEMATOCRIT 37.6  --  46.0   PLATELETS 215  --  246   NEUTROS ABS 10.60*  --  8.45*   IMMATURE GRANS (ABS) 0.06*  --  0.05   LYMPHS ABS 1.42  --  1.45   MONOS ABS 0.83  --  0.72   EOS ABS 0.03  --  0.03   MCV 94.9  --  94.3   PROCALCITONIN  --  0.24  --    HSTROP T  --  35* 35*         Lab 25  0301 25  1349 25  1153   SODIUM 141 141 139   POTASSIUM 3.5 3.9 4.8   CHLORIDE 106 104 94*   CO2 26.0 31.0* 25.0   ANION GAP 9.0 6.0 20.0*   BUN 45* 48* 60*   CREATININE 1.02* 1.56* 1.39*   EGFR 51.7* 31.1* 35.7*   GLUCOSE 110* 138* 106*   CALCIUM 9.5 11.0* 13.2*   IONIZED CALCIUM  --  1.37*  --    MAGNESIUM  --  1.9 2.4*   PHOSPHORUS 4.2 1.5*  --    TSH  --  3.570  --          Lab 25  1153   TOTAL PROTEIN 7.5   ALBUMIN 4.2   GLOBULIN 3.3   ALT (SGPT) 9   AST (SGOT) 17   BILIRUBIN 0.4   ALK PHOS 71         Lab 25  1310 25  1153   HSTROP T 35* 35*                 Brief Urine Lab Results  (Last result in the past 365 days)        Color   Clarity   Blood   Leuk Est   Nitrite   Protein   CREAT   Urine HCG        25 1222 " Yellow   Turbid   Moderate (2+)   Large (3+)   Negative   30 mg/dL (1+)                   Microbiology Results Abnormal       Procedure Component Value - Date/Time    COVID PRE-OP / PRE-PROCEDURE SCREENING ORDER (NO ISOLATION) - Swab, Nasopharynx [[id number]]  (Abnormal) Collected: 01/24/25 1201    Lab Status: Final result Specimen: Swab from Nasopharynx Updated: 01/24/25 1256    Narrative:      The following orders were created for panel order COVID PRE-OP / PRE-PROCEDURE SCREENING ORDER (NO ISOLATION) - Swab, Nasopharynx.  Procedure                               Abnormality         Status                     ---------                               -----------         ------                     COVID-19 and FLU A/B PCR...[[id number]]  Abnormal            Final result                 Please view results for these tests on the individual orders.    COVID-19 and FLU A/B PCR, 1 HR TAT - Swab, Nasopharynx [[id number]]  (Abnormal) Collected: 01/24/25 1201    Lab Status: Final result Specimen: Swab from Nasopharynx Updated: 01/24/25 1256     COVID19 Detected     Influenza A PCR Not Detected     Influenza B PCR Not Detected    Narrative:      Fact sheet for providers: https://www.fda.gov/media/479156/download    Fact sheet for patients: https://www.fda.gov/media/331872/download    Test performed by PCR.  Influenza A and Influenza B negative results should be considered presumptive in samples that have a positive SARS-CoV-2 result.    Competitive inhibition studies showed that SARS-CoV-2 virus, when present at concentrations above 3.6E+04 copies/mL, can inhibit the detection and amplification of influenza A and influenza B virus RNA if present at or below 1.8E+02 copies/mL or 4.9E+02 copies/mL, respectively, and may lead to false negative influenza virus results. If co-infection with influenza A or influenza B virus is suspected in samples with a positive SARS-CoV-2 result, the sample should be re-tested with another FDA  cleared, approved, or authorized influenza test, if influenza virus detection would change clinical management.            XR Chest 1 View    Result Date: 1/24/2025  XR CHEST 1 VW Date of Exam: 1/24/2025 11:49 AM EST Indication: Weak/Dizzy/AMS triage protocol Comparison: Chest x-ray 8/29/2021 Findings: The heart is stable in size. There is some left lower lobe airspace disease at the costophrenic angle and suspected underlying pleural effusion. No evidence for pneumothorax. Cardiac pacemaker is noted.     Impression: Impression: Some suspected left lower lobe airspace disease of the costophrenic angle with potential trace effusion. Electronically Signed: Adriana Asencio MD  1/24/2025 12:22 PM EST  Workstation ID: [id number]     Results for orders placed during the hospital encounter of 08/29/21    Adult Transthoracic Echo Complete W/ Cont if Necessary Per Protocol    Interpretation Summary  · Estimated left ventricular EF = 30-35%. Moderate to severe LV systolic dysfunction.  · Left ventricular wall thickness is consistent with mild concentric hypertrophy.  · Left ventricular diastolic function is consistent with (grade II w/high LAP) pseudonormalization.  · Severe aortic valve regurgitation is present.  · Moderate to severe mitral valve regurgitation is present.  · Mild tricuspid valve regurgitation is present  · Estimated right ventricular systolic pressure from tricuspid regurgitation is normal (<35 mmHg).      Current medications:  Scheduled Meds:apixaban, 2.5 mg, Oral, BID  ceFAZolin, 1,000 mg, Intravenous, Q12H  [Held by provider] furosemide, 20 mg, Oral, Daily  levothyroxine, 88 mcg, Oral, Q AM  metoprolol succinate XL, 25 mg, Oral, Daily  sodium chloride, 10 mL, Intravenous, Q12H  [Held by provider] spironolactone, 25 mg, Oral, Daily      Continuous Infusions:     PRN Meds:.  acetaminophen    senna-docusate sodium **AND** polyethylene glycol **AND** bisacodyl **AND** bisacodyl    Calcium Replacement - Follow  Nurse / BPA Driven Protocol    Magnesium Standard Dose Replacement - Follow Nurse / BPA Driven Protocol    nitroglycerin    ondansetron    Phosphorus Replacement - Follow Nurse / BPA Driven Protocol    Potassium Replacement - Follow Nurse / BPA Driven Protocol    sodium chloride    sodium chloride    sodium chloride    Assessment & Plan   Assessment & Plan     Active Hospital Problems    Diagnosis  POA    **UTI (urinary tract infection) [N39.0]  Yes      Resolved Hospital Problems   No resolved problems to display.        Brief Hospital Course to date:  Laureen Nettles is a 89 y.o. female with history of hypothyroidism and paroxysmal A-fib who lives at [facility] and sent for lethargy, mental status changes.  Labs significant for hypercalcemia and mild MEENAKSHI.      Mental status changes  - hypercalcemia, likely from supplements and dehydration:  improved   - 2000ml infused.  IVF dcd.   - recent COVID, ? L basilar infiltrate.  Cefazolin will cover most organisms and the infiltrate is likely from COVID not bacterial, or is noninfectious.   - meds reviewed:  no sedatives prescribed  - supplements stopped, diuretics stopped - would not restart them.      COVID +  - diagnosed on 1/21/25  - denies symptoms.  On room air.      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  Cx no growth.           [place] and [place] [facility] [place]  Expected discharge date/ time has not been documented.     VTE Prophylaxis:  Pharmacologic & mechanical VTE prophylaxis orders are present.         AM-PAC 6 Clicks Score (PT): 18 (01/26/25 0600)    CODE STATUS:   Code Status and Medical Interventions: CPR (Attempt to Resuscitate); Full Support   Ordered at: 01/24/25 1345     Level Of Support  Discussed With:    Patient     Code Status (Patient has no pulse and is not breathing):    CPR (Attempt to Resuscitate)     Medical Interventions (Patient has pulse or is breathing):    Full Support       Maira Bates MD  01/26/25

## 2025-01-26 NOTE — PLAN OF CARE
Problem: Adult Inpatient Plan of Care  Goal: Plan of Care Review  Outcome: Not Progressing  Flowsheets (Taken 1/26/2025 1817)  Progress: no change  Plan of Care Reviewed With: patient  Goal: Patient-Specific Goal (Individualized)  Outcome: Not Progressing  Goal: Absence of Hospital-Acquired Illness or Injury  Outcome: Not Progressing  Intervention: Identify and Manage Fall Risk  Recent Flowsheet Documentation  Taken 1/26/2025 1800 by Sophia Ames RN  Safety Promotion/Fall Prevention:   activity supervised   assistive device/personal items within reach   clutter free environment maintained   lighting adjusted   nonskid shoes/slippers when out of bed   room organization consistent   safety round/check completed  Taken 1/26/2025 1605 by Sophia Ames, ELLIOT  Safety Promotion/Fall Prevention:   activity supervised   assistive device/personal items within reach   clutter free environment maintained   lighting adjusted   nonskid shoes/slippers when out of bed   room organization consistent   safety round/check completed  Taken 1/26/2025 1400 by Sophia Ames RN  Safety Promotion/Fall Prevention:   activity supervised   assistive device/personal items within reach   clutter free environment maintained   lighting adjusted   nonskid shoes/slippers when out of bed   room organization consistent   safety round/check completed  Taken 1/26/2025 1324 by Sophia Ames, ELLIOT  Safety Promotion/Fall Prevention:   activity supervised   fall prevention program maintained   nonskid shoes/slippers when out of bed   safety round/check completed   assistive device/personal items within reach   clutter free environment maintained  Taken 1/26/2025 1200 by Sophia Ames, ELLIOT  Safety Promotion/Fall Prevention:   activity supervised   assistive device/personal items within reach   clutter free environment maintained   lighting adjusted   nonskid shoes/slippers when out of bed   room organization consistent   safety round/check completed  Taken  1/26/2025 1000 by Sophia Ames RN  Safety Promotion/Fall Prevention:   activity supervised   assistive device/personal items within reach   clutter free environment maintained   lighting adjusted   nonskid shoes/slippers when out of bed   room organization consistent   safety round/check completed  Taken 1/26/2025 0800 by Sophia Ames RN  Safety Promotion/Fall Prevention:   assistive device/personal items within reach   clutter free environment maintained   lighting adjusted   nonskid shoes/slippers when out of bed   room organization consistent   safety round/check completed  Intervention: Prevent Skin Injury  Recent Flowsheet Documentation  Taken 1/26/2025 1800 by Sophia Ames RN  Body Position: position maintained  Skin Protection:   protective footwear used   transparent dressing maintained   incontinence pads utilized  Taken 1/26/2025 1735 by Sophia Ames RN  Body Position:   turned   right   head facing, right  Taken 1/26/2025 1605 by Sophia Ames RN  Body Position:   neutral body alignment   neutral head position   sitting up in bed  Skin Protection:   protective footwear used   transparent dressing maintained   incontinence pads utilized  Taken 1/26/2025 1400 by Sophia Ames RN  Body Position: (When checking on patient she has turned herself from the position she was placed in by staff.)   position changed independently   turned   left   head facing, left   side-lying  Skin Protection:   protective footwear used   transparent dressing maintained   incontinence pads utilized  Taken 1/26/2025 1324 by Sophia Ames RN  Body Position:   turned   right   head facing, right  Skin Protection:   skin sealant/moisture barrier applied   transparent dressing maintained   protective footwear used   incontinence pads utilized  Taken 1/26/2025 1200 by Sophia Ames RN  Body Position: legs elevated  Skin Protection:   protective footwear used   transparent dressing maintained   incontinence pads  utilized  Taken 1/26/2025 1000 by Sophia Ames RN  Body Position: position maintained  Skin Protection:   protective footwear used   transparent dressing maintained   incontinence pads utilized  Taken 1/26/2025 0934 by Sophia Ames RN  Body Position:   turned   left   head facing, left   side-lying  Taken 1/26/2025 0800 by Sophia Ames RN  Body Position:   neutral body alignment   neutral head position  Skin Protection:   incontinence pads utilized   protective footwear used   transparent dressing maintained  Intervention: Prevent and Manage VTE (Venous Thromboembolism) Risk  Recent Flowsheet Documentation  Taken 1/26/2025 0800 by Sophia Ames RN  VTE Prevention/Management:   bilateral   SCDs (sequential compression devices) off  Intervention: Prevent Infection  Recent Flowsheet Documentation  Taken 1/26/2025 1800 by Sophia Ames RN  Infection Prevention:   environmental surveillance performed   equipment surfaces disinfected   hand hygiene promoted  Taken 1/26/2025 1605 by Sophia Ames RN  Infection Prevention:   environmental surveillance performed   equipment surfaces disinfected   hand hygiene promoted  Taken 1/26/2025 1400 by Sophia Ames RN  Infection Prevention:   environmental surveillance performed   equipment surfaces disinfected   hand hygiene promoted  Taken 1/26/2025 1200 by Sophia Ames RN  Infection Prevention:   environmental surveillance performed   equipment surfaces disinfected   hand hygiene promoted  Taken 1/26/2025 1000 by Sophia Ames RN  Infection Prevention:   environmental surveillance performed   equipment surfaces disinfected   hand hygiene promoted  Taken 1/26/2025 0800 by Sophia Ames RN  Infection Prevention:   environmental surveillance performed   equipment surfaces disinfected   hand hygiene promoted  Goal: Optimal Comfort and Wellbeing  Outcome: Not Progressing  Intervention: Provide Person-Centered Care  Recent Flowsheet Documentation  Taken 1/26/2025 1800 by  Sophia Ames RN  Trust Relationship/Rapport:   care explained   questions answered   questions encouraged   thoughts/feelings acknowledged  Taken 1/26/2025 1605 by Sophia Ames RN  Trust Relationship/Rapport:   care explained   questions answered   questions encouraged   thoughts/feelings acknowledged  Taken 1/26/2025 1400 by Sophia Ames RN  Trust Relationship/Rapport:   care explained   questions answered   questions encouraged   thoughts/feelings acknowledged  Taken 1/26/2025 1200 by Sophia Ames RN  Trust Relationship/Rapport:   care explained   questions answered   questions encouraged   thoughts/feelings acknowledged  Taken 1/26/2025 1000 by Sophia Ames RN  Trust Relationship/Rapport:   care explained   questions answered   questions encouraged   thoughts/feelings acknowledged  Taken 1/26/2025 0800 by Sophia Ames RN  Trust Relationship/Rapport:   care explained   emotional support provided   questions answered   questions encouraged   thoughts/feelings acknowledged  Goal: Readiness for Transition of Care  Outcome: Not Progressing     Problem: Skin Injury Risk Increased  Goal: Skin Health and Integrity  Outcome: Not Progressing  Intervention: Optimize Skin Protection  Recent Flowsheet Documentation  Taken 1/26/2025 1800 by Sophia Ames RN  Activity Management: activity encouraged  Pressure Reduction Techniques: frequent weight shift encouraged  Head of Bed (HOB) Positioning: HOB elevated  Pressure Reduction Devices:   positioning supports utilized   pressure-redistributing mattress utilized  Skin Protection:   protective footwear used   transparent dressing maintained   incontinence pads utilized  Taken 1/26/2025 1735 by Sophia Ames RN  Head of Bed (HOB) Positioning: HOB elevated  Taken 1/26/2025 1605 by Sophia Ames RN  Activity Management: activity encouraged  Pressure Reduction Techniques:   frequent weight shift encouraged   weight shift assistance provided  Head of Bed (HOB)  Positioning: [facility] elevated  Pressure Reduction Devices:   positioning supports utilized   pressure-redistributing mattress utilized  Skin Protection:   protective footwear used   transparent dressing maintained   incontinence pads utilized  Taken 1/26/2025 1400 by Sophia Ames RN  Activity Management: activity encouraged  Pressure Reduction Techniques: frequent weight shift encouraged  Head of Bed (HOB) Positioning: [facility] elevated  Pressure Reduction Devices:   positioning supports utilized   pressure-redistributing mattress utilized  Skin Protection:   protective footwear used   transparent dressing maintained   incontinence pads utilized  Taken 1/26/2025 1324 by Sophia Ames RN  Activity Management: back to bed  Pressure Reduction Techniques: weight shift assistance provided  Head of Bed ([facility]) Positioning: [facility] elevated  Pressure Reduction Devices: pressure-redistributing mattress utilized  Skin Protection:   skin sealant/moisture barrier applied   transparent dressing maintained   protective footwear used   incontinence pads utilized  Taken 1/26/2025 1200 by Sophia Ames RN  Activity Management: up in chair  Pressure Reduction Techniques: frequent weight shift encouraged  Head of Bed ([facility]) Positioning: [facility] elevated  Pressure Reduction Devices: (waffle mattress applie)   positioning supports utilized   pressure-redistributing mattress utilized  Skin Protection:   protective footwear used   transparent dressing maintained   incontinence pads utilized  Taken 1/26/2025 1000 by Sophia Ames RN  Activity Management: activity encouraged  Pressure Reduction Techniques: frequent weight shift encouraged  Head of Bed ([facility]) Positioning: [facility] elevated  Pressure Reduction Devices: positioning supports utilized  Skin Protection:   protective footwear used   transparent dressing maintained   incontinence pads utilized  Taken 1/26/2025 0800 by Sophia Ames RN  Activity Management: activity encouraged  Pressure Reduction  Techniques: frequent weight shift encouraged  Head of Bed (HOB) Positioning: HOB elevated  Pressure Reduction Devices: positioning supports utilized  Skin Protection:   incontinence pads utilized   protective footwear used   transparent dressing maintained   Goal Outcome Evaluation:  Plan of Care Reviewed With: patient        Progress: no change

## 2025-01-26 NOTE — THERAPY EVALUATION
Patient Name: Laureen Nettles  : 1932    MRN: [id number]                              Today's Date: 2025       Admit Date: 2025    Visit Dx:     ICD-10-CM ICD-9-CM   1. Urinary tract infection without hematuria, site unspecified  N39.0 599.0   2. Generalized weakness  R53.1 780.79   3. Renal insufficiency  N28.9 593.9   4. Dehydration  E86.0 276.51   5. Hypercalcemia  E83.52 275.42   6. COVID-19 virus infection  U07.1 079.89     Patient Active Problem List   Diagnosis    Hyperlipidemia    Hypothyroidism    Cardiac pacemaker in situ    Aortic valve regurgitation    HTN (hypertension)    Osteoporosis    Acquired cystic kidney disease    Acute systolic CHF (congestive heart failure)    UTI (urinary tract infection)    Dehydration     Past Medical History:   Diagnosis Date    Aortic valve regurgitation     B12 deficiency     COVID-19 2022    Falls     Heart failure     HLD (hyperlipidemia)     HTN (hypertension)     Hypothyroidism     Pacemaker      Past Surgical History:   Procedure Laterality Date    ANAL FISTULA REPAIR      TONSILLECTOMY        General Information       Row Name 25 1312          OT Time and Intention    Document Type evaluation  -LC     Mode of Treatment occupational therapy  -LC       Row Name 25 1312          General Information    Patient Profile Reviewed yes  -LC     Prior Level of Function --  Limited historian, family not present to provide PLOF  -LC     Existing Precautions/Restrictions fall;other (see comments)  JACIEL VALENZUELA side better than R  -LC     Barriers to Rehab previous functional deficit;medically complex;cognitive status;hearing deficit  -LC       Row Name 25 1312          Living Environment    People in Home facility resident  Per chart [facility]  -LC       Row Name 25 1312          Home Main Entrance    Number of Stairs, Main Entrance none  -LC       Row Name 25 1312          Stairs Within Home, Primary    Number of Stairs, Within Home,  Primary none  -       Row Name 01/26/25 1312          Cognition    Orientation Status (Cognition) oriented to;person;disoriented to;place;situation;time  -       Row Name 01/26/25 1312          Safety Issues/Impairments Affecting Functional Mobility    Safety Issues Affecting Function (Mobility) awareness of need for assistance;insight into deficits/self-awareness;safety precaution awareness;safety precautions follow-through/compliance;sequencing abilities  -     Impairments Affecting Function (Mobility) balance;cognition;endurance/activity tolerance;postural/trunk control;strength  -     Cognitive Impairments, Mobility Safety/Performance awareness, need for assistance;insight into deficits/self-awareness;problem-solving/reasoning;safety precaution awareness;safety precaution follow-through  -               User Key  (r) = Recorded By, (t) = Taken By, (c) = Cosigned By      Initials Name Provider Type     Domonique Delarosa OT Occupational Therapist                     Mobility/ADL's       Row Name 01/26/25 1319          Bed Mobility    Bed Mobility scooting/bridging;supine-sit  -     Scooting/Bridging [place] (Bed Mobility) maximum assist (25% patient effort);2 person assist;verbal cues  -     Supine-Sit [place] (Bed Mobility) maximum assist (25% patient effort);2 person assist;verbal cues  -     Assistive Device (Bed Mobility) head of bed elevated;repositioning sheet  -     Comment, (Bed Mobility) VC's to sequence transitioning from supine to sit EOB. Assist with B LE's and trunk into upright position.  -       Row Name 01/26/25 1319          Transfers    Transfers sit-stand transfer;bed-chair transfer  -     Comment, (Transfers) VC's for hand placement and sequencing.  -       Row Name 01/26/25 1319          Bed-Chair Transfer    Bed-Chair [place] (Transfers) verbal cues;moderate assist (50% patient effort);2 person assist  -     Assistive Device (Bed-Chair Transfers) other  (see comments)  UE support  -       Row Name 01/26/25 1319          Sit-Stand Transfer    Sit-Stand [place] (Transfers) moderate assist (50% patient effort);2 person assist;verbal cues  -     Assistive Device (Sit-Stand Transfers) other (see comments)  UE support  -       Row Name 01/26/25 1319          Activities of Daily Living    BADL Assessment/Intervention bathing;lower body dressing;grooming  -[facility] Name 01/26/25 1319          Bathing Assessment/Intervention    [place] Level (Bathing) bathing skills;perineal area;lower body;dependent (less than 25% patient effort)  -     Position (Bathing) supported standing;unsupported sitting  -     Comment, (Bathing) Pt. with incontinence during T/F requiring clean up.  -       Row Name 01/26/25 1319          Lower Body Dressing Assessment/Training    [place] Level (Lower Body Dressing) don;socks;dependent (less than 25% patient effort)  -[facility] Name 01/26/25 1319          Grooming Assessment/Training    [place] Level (Grooming) wash face, hands;hair care, combing/brushing;moderate assist (50% patient effort);minimum assist (75% patient effort);verbal cues  -               User Key  (r) = Recorded By, (t) = Taken By, (c) = Cosigned By      Initials Name Provider Type     Domonique Delarosa OT Occupational Therapist                   Obj/Interventions       Row Name 01/26/25 1324          Sensory Assessment (Somatosensory)    Sensory Assessment (Somatosensory) unable/difficult to assess  -[facility] Name 01/26/25 1324          Vision Assessment/Intervention    Visual Impairment/Limitations corrective lenses full-time  Asking for her glasses throughout session, OT did not find them to be in the room.  -       Row Name 01/26/25 1324          Range of Motion Comprehensive    General Range of Motion bilateral upper extremity ROM WFL  -[facility] Name 01/26/25 1324          Strength Comprehensive (MMT)    Comment, General Manual  Muscle Testing (MMT) Assessment Not formally assessed, limited command following. Anticipate 3/5  -       Row Name 01/26/25 1324          Motor Skills    Motor Skills functional endurance  -     Functional Endurance impaired activity tolerance  -       Row Name 01/26/25 1324          Balance    Balance Assessment sitting static balance;sitting dynamic balance;standing static balance;standing dynamic balance  -     Static Sitting Balance verbal cues;minimal assist  -     Dynamic Sitting Balance verbal cues;minimal assist  -     Position, Sitting Balance unsupported;sitting edge of bed  -     Static Standing Balance verbal cues;moderate assist;2-person assist  -     Dynamic Standing Balance moderate assist;2-person assist  -     Position/Device Used, Standing Balance supported;other (see comments)  B UE support  -     Balance Interventions sitting;standing;sit to stand;supported;occupation based/functional task;weight shifting activity  -     Comment, Balance VC's for upright posture in standing to improve stability.  -               User Key  (r) = Recorded By, (t) = Taken By, (c) = Cosigned By      Initials Name Provider Type     Domonique Delarosa OT Occupational Therapist                   Goals/Plan       Row Name 01/26/25 1343          Transfer Goal 1 (OT)    Activity/Assistive Device (Transfer Goal 1, OT) sit-to-stand/stand-to-sit;bed-to-chair/chair-to-bed;toilet;walker, rolling  -     [place] Level/Cues Needed (Transfer Goal 1, OT) minimum assist (75% or more patient effort);verbal cues required  -     Time Frame (Transfer Goal 1, OT) long term goal (LTG);10 days  -     Progress/Outcome (Transfer Goal 1, OT) goal ongoing  -       Row Name 01/26/25 8796          Dressing Goal 1 (OT)    Activity/Device (Dressing Goal 1, OT) upper body dressing  -     [place]/Cues Needed (Dressing Goal 1, OT) contact guard required;verbal cues required  -     Time Frame (Dressing Goal  1, OT) short term goal (STG);5 days  -     Progress/Outcome (Dressing Goal 1, OT) goal ongoing  -       Row Name 01/26/25 1340          Grooming Goal 1 (OT)    Activity/Device (Grooming Goal 1, OT) grooming skills, all;wash face, hands;oral care;hair care  -     [place] (Grooming Goal 1, OT) minimum assist (75% or more patient effort);verbal cues required  -     Time Frame (Grooming Goal 1, OT) long term goal (LTG);10 days  -     Progress/Outcome (Grooming Goal 1, OT) goal ongoing  -       Row Name 01/26/25 1340          Therapy Assessment/Plan (OT)    Planned Therapy Interventions (OT) activity tolerance training;adaptive equipment training;BADL retraining;functional balance retraining;occupation/activity based interventions;patient/caregiver education/training;strengthening exercise;transfer/mobility retraining  -               User Key  (r) = Recorded By, (t) = Taken By, (c) = Cosigned By      Initials Name Provider Type     Domonique Delarosa, OT Occupational Therapist                   Clinical Impression       Row Name 01/26/25 1331          Pain Assessment    Pretreatment Pain Rating 0/10 - no pain  -     Posttreatment Pain Rating 0/10 - no pain  -LC       Row Name 01/26/25 1331          Plan of Care Review    Plan of Care Reviewed With patient  -     Progress no change  -     Outcome Evaluation Pt. presents below baseline with ADLs and functional mobility. Limited by decreased activity tolerance, generalized weakness, and balance. Skilled OT services warranted to promote return to PLOF. Recommend SNF at discharge.  -       Row Name 01/26/25 1331          Therapy Assessment/Plan (OT)    Patient/Family Therapy Goal Statement (OT) To return to PLOF  -     Therapy Frequency (OT) daily  -     Predicted Duration of Therapy Intervention (OT) To return to PLOF  -       Row Name 01/26/25 1331          Therapy Plan Review/Discharge Plan (OT)    Anticipated Discharge Disposition (OT)  skilled nursing facility  -       Row Name 01/26/25 1331          Vital Signs    Pre Systolic BP Rehab 107  -LC     Pre Treatment Diastolic BP 49  -LC     Post Systolic BP Rehab 105  -LC     Post Treatment Diastolic BP 45  -LC     Pre Patient Position Supine  -LC     Post Patient Position Sitting  -       Row Name 01/26/25 1331          Positioning and Restraints    Pre-Treatment Position in bed  -LC     Post Treatment Position chair  -LC     In Chair notified nsg;reclined;call light within reach;encouraged to call for assist;waffle cushion;legs elevated;exit alarm on  -               User Key  (r) = Recorded By, (t) = Taken By, (c) = Cosigned By      Initials Name Provider Type     Domonique Delarosa, ROJAS Occupational Therapist                   Outcome Measures       Row Name 01/26/25 1342          How much help from another is currently needed...    Putting on and taking off regular lower body clothing? 1  -LC     Bathing (including washing, rinsing, and drying) 1  -LC     Toileting (which includes using toilet bed pan or urinal) 1  -LC     Putting on and taking off regular upper body clothing 2  -LC     Taking care of personal grooming (such as brushing teeth) 2  -LC     Eating meals 2  -LC     AM-PAC 6 Clicks Score (OT) 9  -       Row Name 01/26/25 1343 01/26/25 0800       How much help from another person do you currently need...    Turning from your back to your side while in flat bed without using bedrails? 3  -ZHANNA 3  -AW    Moving from lying on back to sitting on the side of a flat bed without bedrails? 3  -ZHANNA 3  -AW    Moving to and from a bed to a chair (including a wheelchair)? 2  -ZHANNA 3  -AW    Standing up from a chair using your arms (e.g., wheelchair, bedside chair)? 2  -ZHANNA 3  -AW    Climbing 3-5 steps with a railing? 1  -ZHANNA 2  -AW    To walk in hospital room? 1  -ZHANNA 2  -AW    AM-PAC 6 Clicks Score (PT) 12  -ZHANNA 16  -AW    Highest Level of Mobility Goal 4 --> Transfer to chair/commode  -ZHANNA 5 -->  Static standing  -AW      Row Name 01/26/25 0600 01/26/25 0400       How much help from another person do you currently need...    Turning from your back to your side while in flat bed without using bedrails? 3  -BB 3  -BB    Moving from lying on back to sitting on the side of a flat bed without bedrails? 3  -BB 3  -BB    Moving to and from a bed to a chair (including a wheelchair)? 3  -BB 3  -BB    Standing up from a chair using your arms (e.g., wheelchair, bedside chair)? 3  -BB 3  -BB    Climbing 3-5 steps with a railing? 3  -BB 3  -BB    To walk in hospital room? 3  -BB 3  -BB    AM-PAC 6 Clicks Score (PT) 18  -BB 18  -BB    Highest Level of Mobility Goal 6 --> Walk 10 steps or more  -BB 6 --> Walk 10 steps or more  -BB      Row Name 01/26/25 0200          How much help from another person do you currently need...    Turning from your back to your side while in flat bed without using bedrails? 3  -BB     Moving from lying on back to sitting on the side of a flat bed without bedrails? 3  -BB     Moving to and from a bed to a chair (including a wheelchair)? 3  -BB     Standing up from a chair using your arms (e.g., wheelchair, bedside chair)? 3  -BB     Climbing 3-5 steps with a railing? 3  -BB     To walk in hospital room? 3  -BB     AM-PAC 6 Clicks Score (PT) 18  -BB     Highest Level of Mobility Goal 6 --> Walk 10 steps or more  -BB       Row Name 01/26/25 1343 01/26/25 1342       Functional Assessment    Outcome Measure Options AM-PAC 6 Clicks Basic Mobility (PT)  -ZHANNA AM-PAC 6 Clicks Daily Activity (OT)  -SAMANTHA              User Key  (r) = Recorded By, (t) = Taken By, (c) = Cosigned By      Initials Name Provider Type    Paige Trevino, PT Physical Therapist    Domonique Valverde, OT Occupational Therapist    Sophia Norton, RN Registered Nurse    Logan Zuniga, ELLIOT Registered Nurse                    Occupational Therapy Education       Title: PT OT SLP Therapies (In Progress)       Topic: Occupational  Therapy (In Progress)       Point: ADL training (In Progress)       Description:   Instruct learner(s) on proper safety adaptation and remediation techniques during self care or transfers.   Instruct in proper use of assistive devices.                  Learning Progress Summary            Patient Acceptance, E, NR by  at 1/26/2025 1054                      Point: Home exercise program (Not Started)       Description:   Instruct learner(s) on appropriate technique for monitoring, assisting and/or progressing therapeutic exercises/activities.                  Learner Progress:  Not documented in this visit.              Point: Precautions (In Progress)       Description:   Instruct learner(s) on prescribed precautions during self-care and functional transfers.                  Learning Progress Summary            Patient Acceptance, E, NR by  at 1/26/2025 1054                      Point: Body mechanics (In Progress)       Description:   Instruct learner(s) on proper positioning and spine alignment during self-care, functional mobility activities and/or exercises.                  Learning Progress Summary            Patient Acceptance, E, NR by  at 1/26/2025 1054                                      User Key       Initials Effective Dates Name Provider Type Discipline     06/16/21 -  Domonique Delarosa, ROJAS Occupational Therapist OT                  OT Recommendation and Plan  Planned Therapy Interventions (OT): activity tolerance training, adaptive equipment training, BADL retraining, functional balance retraining, occupation/activity based interventions, patient/caregiver education/training, strengthening exercise, transfer/mobility retraining  Therapy Frequency (OT): daily  Plan of Care Review  Plan of Care Reviewed With: patient  Progress: no change  Outcome Evaluation: Pt. presents below baseline with ADLs and functional mobility. Limited by decreased activity tolerance, generalized weakness, and balance.  Skilled OT services warranted to promote return to PLOF. Recommend SNF at discharge.     Time Calculation:   Evaluation Complexity (OT)  Review Occupational Profile/Medical/Therapy History Complexity: brief/low complexity  Assessment, Occupational Performance/Identification of Deficit Complexity: 1-3 performance deficits  Clinical Decision Making Complexity (OT): problem focused assessment/low complexity  Overall Complexity of Evaluation (OT): low complexity     Time Calculation- OT       Row Name 01/26/25 1343             Time Calculation- OT    OT Start Time 1054  -LC      OT Received On 01/26/25  -LC      OT Goal Re-Cert Due Date 02/05/25  -         Untimed Charges    OT Eval/Re-eval Minutes 51  -LC         Total Minutes    Untimed Charges Total Minutes 51  -LC       Total Minutes 51  -LC                User Key  (r) = Recorded By, (t) = Taken By, (c) = Cosigned By      Initials Name Provider Type    LC Domonique Delarosa OT Occupational Therapist                  Therapy Charges for Today       Code Description Service Date Service Provider Modifiers Qty    [id number] HC OT EVAL LOW COMPLEXITY 4 1/26/2025 Domonique Delarosa OT GO 1                 Domonique Delarosa OT  1/26/2025

## 2025-01-26 NOTE — THERAPY EVALUATION
Patient Name: Laureen Nettles  : 1932    MRN: [id number]                              Today's Date: 2025       Admit Date: 2025    Visit Dx:     ICD-10-CM ICD-9-CM   1. Urinary tract infection without hematuria, site unspecified  N39.0 599.0   2. Generalized weakness  R53.1 780.79   3. Renal insufficiency  N28.9 593.9   4. Dehydration  E86.0 276.51   5. Hypercalcemia  E83.52 275.42   6. COVID-19 virus infection  U07.1 079.89     Patient Active Problem List   Diagnosis    Hyperlipidemia    Hypothyroidism    Cardiac pacemaker in situ    Aortic valve regurgitation    HTN (hypertension)    Osteoporosis    Acquired cystic kidney disease    Acute systolic CHF (congestive heart failure)    UTI (urinary tract infection)    Dehydration     Past Medical History:   Diagnosis Date    Aortic valve regurgitation     B12 deficiency     COVID-19 2022    Falls     Heart failure     HLD (hyperlipidemia)     HTN (hypertension)     Hypothyroidism     Pacemaker      Past Surgical History:   Procedure Laterality Date    ANAL FISTULA REPAIR      TONSILLECTOMY        General Information       Row Name 25 1336          Physical Therapy Time and Intention    Document Type evaluation  -ZHANNA     Mode of Treatment physical therapy  -ZHANNA       Row Name 25 1336          General Information    Patient Profile Reviewed yes  -ZHANNA     Prior Level of Function --  limited historian, [place], no family present. Pt resides at [facility] per chart  -ZHANNA     Existing Precautions/Restrictions fall;other (see comments)  [place] L better than R  -ZHANNA     Barriers to Rehab previous functional deficit;cognitive status;hearing deficit;medically complex  -ZHANNA       Row Name 25 1336          Living Environment    People in Home facility resident  -ZHANNA       Row Name 25 1336          Home Main Entrance    Number of Stairs, Main Entrance none  -ZHANNA       Row Name 25 1336          Stairs Within Home, Primary    Number of Stairs, Within  Home, Primary none  -       Row Name 01/26/25 1336          Cognition    Orientation Status (Cognition) oriented to;person;disoriented to;place;situation;time  -       Row Name 01/26/25 1336          Safety Issues/Impairments Affecting Functional Mobility    Safety Issues Affecting Function (Mobility) awareness of need for assistance;insight into deficits/self-awareness;safety precaution awareness;sequencing abilities;safety precautions follow-through/compliance  -ZHANNA     Impairments Affecting Function (Mobility) balance;cognition;endurance/activity tolerance;postural/trunk control;strength  -ZHANNA     Cognitive Impairments, Mobility Safety/Performance awareness, need for assistance;insight into deficits/self-awareness;safety precaution follow-through  -ZHANNA               User Key  (r) = Recorded By, (t) = Taken By, (c) = Cosigned By      Initials Name Provider Type    Paige Trevino, PT Physical Therapist                   Mobility       Row Name 01/26/25 1338          Bed Mobility    Bed Mobility supine-sit  -     Supine-Sit Hecker (Bed Mobility) maximum assist (25% patient effort);2 person assist;verbal cues;nonverbal cues (demo/gesture)  -     Assistive Device (Bed Mobility) head of bed elevated;repositioning sheet;bed rails  -     Comment, (Bed Mobility) Assist needed at trunk and LEs  -       Row Name 01/26/25 1338          Transfers    Comment, (Transfers) cues for sequencing and hand placement  -       Row Name 01/26/25 1338          Bed-Chair Transfer    Bed-Chair Hecker (Transfers) verbal cues;2 person assist;maximum assist (25% patient effort)  -     Assistive Device (Bed-Chair Transfers) other (see comments)  UE support  -       Row Name 01/26/25 1338          Sit-Stand Transfer    Sit-Stand Hecker (Transfers) maximum assist (25% patient effort);2 person assist  -     Assistive Device (Sit-Stand Transfers) other (see comments)  UE support  -       Row Name 01/26/25  1338          Gait/Stairs (Locomotion)    Blair Level (Gait) unable to assess  -               User Key  (r) = Recorded By, (t) = Taken By, (c) = Cosigned By      Initials Name Provider Type    Paige Trevino PT Physical Therapist                   Obj/Interventions       Row Name 01/26/25 1340          Range of Motion Comprehensive    General Range of Motion bilateral lower extremity ROM WFL  -       Row Name 01/26/25 1340          Strength Comprehensive (MMT)    General Manual Muscle Testing (MMT) Assessment lower extremity strength deficits identified  -ZHANNA     Comment, General Manual Muscle Testing (MMT) Assessment LEs grossly 3-/5, unable to formally assess  -ZHANNA       Row Name 01/26/25 1340          Balance    Balance Assessment sitting static balance;sitting dynamic balance;standing static balance;standing dynamic balance  -ZHANNA     Static Sitting Balance minimal assist;verbal cues  -ZHANNA     Dynamic Sitting Balance minimal assist  -ZHANNA     Static Standing Balance maximum assist;2-person assist  -ZHANNA     Dynamic Standing Balance maximum assist;2-person assist  -ZHANNA     Position/Device Used, Standing Balance supported  B UE support  -ZHANNA     Balance Interventions sitting;standing;sit to stand  -       Row Name 01/26/25 1340          Sensory Assessment (Somatosensory)    Sensory Assessment (Somatosensory) unable/difficult to assess  -               User Key  (r) = Recorded By, (t) = Taken By, (c) = Cosigned By      Initials Name Provider Type    Paige Trevino PT Physical Therapist                   Goals/Plan       Row Name 01/26/25 1343          Bed Mobility Goal 1 (PT)    Activity/Assistive Device (Bed Mobility Goal 1, PT) sit to supine/supine to sit  -ZHANNA     Blair Level/Cues Needed (Bed Mobility Goal 1, PT) minimum assist (75% or more patient effort)  -ZHANNA     Time Frame (Bed Mobility Goal 1, PT) short term goal (STG);5 days  -       Row Name 01/26/25 1343          Transfer Goal 1  (PT)    Activity/Assistive Device (Transfer Goal 1, PT) sit-to-stand/stand-to-sit  -ZHANNA     [place]ony Level/Cues Needed (Transfer Goal 1, PT) minimum assist (75% or more patient effort)  -ZHANNA     Time Frame (Transfer Goal 1, PT) long term goal (LTG);10 days  -ZHANNA       Row Name 01/26/25 1343          Therapy Assessment/Plan (PT)    Planned Therapy Interventions (PT) balance training;bed mobility training;gait training;home exercise program;transfer training;strengthening;postural re-education;patient/family education;ROM (range of motion)  -ZHANNA               User Key  (r) = Recorded By, (t) = Taken By, (c) = Cosigned By      Initials Name Provider Type    Paige Trevino, PT Physical Therapist                   Clinical Impression       Row Name 01/26/25 1341          Pain    Pretreatment Pain Rating 0/10 - no pain  -ZHANNA     Posttreatment Pain Rating 0/10 - no pain  -ZHANNA       Row Name 01/26/25 1341          Plan of Care Review    Progress no change  -ZHANNA     Outcome Evaluation PT evaluation completed. Pt presents with mobility below baseline, decreased activity tolerance, balance and strength deficits, warrants IP PT services. Recommend SNF upon DC.  -ZHANNA       Row Name 01/26/25 1341          Therapy Assessment/Plan (PT)    Patient/Family Therapy Goals Statement (PT) not stated  -ZHANNA     Rehab Potential (PT) fair  -ZHANNA     Criteria for Skilled Interventions Met (PT) yes;skilled treatment is necessary  -     Therapy Frequency (PT) daily  -ZHANNA       Row Name 01/26/25 1341          Vital Signs    Pre Systolic BP Rehab 107  -ZHANNA     Pre Treatment Diastolic BP 49  -ZHANNA     O2 Delivery Pre Treatment room air  -ZHANNA     O2 Delivery Intra Treatment room air  -ZHANNA     O2 Delivery Post Treatment room air  -ZHANNA     Pre Patient Position Supine  -ZHANNA     Intra Patient Position Standing  -ZHANNA     Post Patient Position Sitting  -ZHANNA       Row Name 01/26/25 1341          Positioning and Restraints    Pre-Treatment Position in bed  -ZHANNA      Post Treatment Position chair  -ZHANNA     In Chair notified nsg;reclined;encouraged to call for assist;call light within reach;legs elevated;RUE elevated;LUE elevated  no alarm box in room, nsg notified.  -ZHANNA               User Key  (r) = Recorded By, (t) = Taken By, (c) = Cosigned By      Initials Name Provider Type    Paige Trevino, PT Physical Therapist                   Outcome Measures       Row Name 01/26/25 1343 01/26/25 0800       How much help from another person do you currently need...    Turning from your back to your side while in flat bed without using bedrails? 3  -ZHANNA 3  -AW    Moving from lying on back to sitting on the side of a flat bed without bedrails? 3  -ZHANNA 3  -AW    Moving to and from a bed to a chair (including a wheelchair)? 2  -ZHANNA 3  -AW    Standing up from a chair using your arms (e.g., wheelchair, bedside chair)? 2  -ZHANNA 3  -AW    Climbing 3-5 steps with a railing? 1  -ZHANNA 2  -AW    To walk in hospital room? 1  -ZHANNA 2  -AW    AM-PAC 6 Clicks Score (PT) 12  -ZHANNA 16  -AW    Highest Level of Mobility Goal 4 --> Transfer to chair/commode  -ZHANNA 5 --> Static standing  -AW      Row Name 01/26/25 0600 01/26/25 0400       How much help from another person do you currently need...    Turning from your back to your side while in flat bed without using bedrails? 3  -BB 3  -BB    Moving from lying on back to sitting on the side of a flat bed without bedrails? 3  -BB 3  -BB    Moving to and from a bed to a chair (including a wheelchair)? 3  -BB 3  -BB    Standing up from a chair using your arms (e.g., wheelchair, bedside chair)? 3  -BB 3  -BB    Climbing 3-5 steps with a railing? 3  -BB 3  -BB    To walk in hospital room? 3  -BB 3  -BB    AM-PAC 6 Clicks Score (PT) 18  -BB 18  -BB    Highest Level of Mobility Goal 6 --> Walk 10 steps or more  -BB 6 --> Walk 10 steps or more  -BB      Row Name 01/26/25 0200          How much help from another person do you currently need...    Turning from your back to  your side while in flat bed without using bedrails? 3  -BB     Moving from lying on back to sitting on the side of a flat bed without bedrails? 3  -BB     Moving to and from a bed to a chair (including a wheelchair)? 3  -BB     Standing up from a chair using your arms (e.g., wheelchair, bedside chair)? 3  -BB     Climbing 3-5 steps with a railing? 3  -BB     To walk in hospital room? 3  -BB     AM-PAC 6 Clicks Score (PT) 18  -BB     Highest Level of Mobility Goal 6 --> Walk 10 steps or more  -BB       Row Name 01/26/25 1343 01/26/25 1342       Functional Assessment    Outcome Measure Options AM-PAC 6 Clicks Basic Mobility (PT)  -ZHANNA AM-PAC 6 Clicks Daily Activity (OT)  -SAMANTHA              User Key  (r) = Recorded By, (t) = Taken By, (c) = Cosigned By      Initials Name Provider Type    Paige Trevino, PT Physical Therapist    Domonique Valverde, OT Occupational Therapist    Sophia Norton, RN Registered Nurse    Logan Zuniga RN Registered Nurse                                 Physical Therapy Education       Title: PT OT SLP Therapies (In Progress)       Topic: Physical Therapy (In Progress)       Point: Mobility training (In Progress)       Learning Progress Summary            Patient Acceptance, E, NR,NL by  at 1/26/2025 1344                      Point: Home exercise program (Not Started)       Learner Progress:  Not documented in this visit.              Point: Body mechanics (In Progress)       Learning Progress Summary            Patient Acceptance, E, NR,NL by  at 1/26/2025 1344                      Point: Precautions (In Progress)       Learning Progress Summary            Patient Acceptance, E, NR,NL by  at 1/26/2025 1344                                      User Key       Initials Effective Dates Name Provider Type Discipline     06/16/21 -  Paige Villalobos, PT Physical Therapist PT                  PT Recommendation and Plan  Planned Therapy Interventions (PT): balance training, bed  mobility training, gait training, home exercise program, transfer training, strengthening, postural re-education, patient/family education, ROM (range of motion)  Progress: no change  Outcome Evaluation: PT evaluation completed. Pt presents with mobility below baseline, decreased activity tolerance, balance and strength deficits, warrants IP PT services. Recommend SNF upon DC.     Time Calculation:   PT Evaluation Complexity  History, PT Evaluation Complexity: 1-2 personal factors and/or comorbidities  Examination of Body Systems (PT Eval Complexity): total of 3 or more elements  Clinical Presentation (PT Evaluation Complexity): evolving  Clinical Decision Making (PT Evaluation Complexity): moderate complexity  Overall Complexity (PT Evaluation Complexity): moderate complexity     PT Charges       Row Name 01/26/25 1345             Time Calculation    Start Time 1110  -ZHANNA      PT Received On 01/26/25  -ZHANNA      PT Goal Re-Cert Due Date 02/05/25  -ZHANNA         Time Calculation- PT    Total Timed Code Minutes- PT 13 minute(s)  -ZHANNA         Timed Charges    95295 - PT Therapeutic Activity Minutes 13  -ZHANNA         Untimed Charges    PT Eval/Re-eval Minutes 53  -ZHANNA         Total Minutes    Timed Charges Total Minutes 13  -ZHANNA      Untimed Charges Total Minutes 53  -ZHANNA       Total Minutes 66  -ZHANNA                User Key  (r) = Recorded By, (t) = Taken By, (c) = Cosigned By      Initials Name Provider Type    ZHANNA Paige Villalobos, PT Physical Therapist                  Therapy Charges for Today       Code Description Service Date Service Provider Modifiers Qty    00843071800 HC PT THERAPEUTIC ACT EA 15 MIN 1/26/2025 Paige Villalobos, PT GP 1    56725747649 HC PT EVAL MOD COMPLEXITY 4 1/26/2025 Paige Villalobos, PT GP 1            PT G-Codes  Outcome Measure Options: AM-PAC 6 Clicks Basic Mobility (PT)  AM-PAC 6 Clicks Score (PT): 12  AM-PAC 6 Clicks Score (OT): 9  PT Discharge Summary  Anticipated Discharge Disposition (PT):  skilled nursing facility    Paige Villalobos, PT  1/26/2025

## 2025-01-26 NOTE — PLAN OF CARE
Goal Outcome Evaluation:  Plan of Care Reviewed With: patient        Progress: no change  Outcome Evaluation: Pt. presents below baseline with ADLs and functional mobility. Limited by decreased activity tolerance, generalized weakness, and balance. Skilled OT services warranted to promote return to PLOF. Recommend SNF at discharge.    Anticipated Discharge Disposition (OT): skilled nursing facility

## 2025-01-26 NOTE — PLAN OF CARE
Goal Outcome Evaluation:           Progress: no change  Outcome Evaluation: PT evaluation completed. Pt presents with mobility below baseline, decreased activity tolerance, balance and strength deficits, warrants IP PT services. Recommend SNF upon DC.    Anticipated Discharge Disposition (PT): skilled nursing facility

## 2025-01-27 PROCEDURE — 25010000002 CEFAZOLIN PER 500 MG: Performed by: INTERNAL MEDICINE

## 2025-01-27 PROCEDURE — 99232 SBSQ HOSP IP/OBS MODERATE 35: CPT | Performed by: INTERNAL MEDICINE

## 2025-01-27 RX ADMIN — APIXABAN 2.5 MG: 2.5 TABLET, FILM COATED ORAL at 22:39

## 2025-01-27 RX ADMIN — CEFAZOLIN 1000 MG: 1 INJECTION, POWDER, FOR SOLUTION INTRAMUSCULAR; INTRAVENOUS at 05:16

## 2025-01-27 RX ADMIN — POLYVINYL ALCOHOL, POVIDONE 2 DROP: 14; 6 SOLUTION/ DROPS OPHTHALMIC at 16:12

## 2025-01-27 RX ADMIN — APIXABAN 2.5 MG: 2.5 TABLET, FILM COATED ORAL at 09:05

## 2025-01-27 RX ADMIN — LEVOTHYROXINE SODIUM 88 MCG: 0.09 TABLET ORAL at 05:16

## 2025-01-27 RX ADMIN — Medication 10 ML: at 22:40

## 2025-01-27 RX ADMIN — Medication 10 ML: at 09:07

## 2025-01-27 NOTE — CASE MANAGEMENT/SOCIAL WORK
Discharge Planning Assessment  [facility]     Patient Name: Laureen Nettles  MRN: [id number]  Today's Date: 1/27/2025    Admit Date: 1/24/2025    [place]   Discharge Needs Assessment       Row Name 01/27/25 1407       Living Environment    People in Home facility resident    Current Living Arrangements assisted living facility    Living Arrangement Comments From Manuela at [facility]. Ambulates with walker unassisted at [facility].       Discharge Needs Assessment    Equipment Currently Used at Home walker, rolling    Equipment Needed After Discharge none    Discharge Coordination/Progress Has had [facility] in the past at her facility.                   Discharge Plan       Row Name 01/27/25 1409       Plan    [place] SNF at [place]    Patient/Family in Agreement with Plan yes    Plan Comments I spoke with Monica at [facility] [phone]. She states the pt would need to be independent with transfers and mobility to return to her current LOC. Currently is requiring assist with mobility. I spoke with the pts friend and POA Mr Marie. I will work on SNF placement unless the pt improves with mobility to return to assisted living.    Final Discharge Disposition Code 03 - skilled nursing facility (SNF)                  Continued Care and Services - Admitted Since 1/24/2025    No active coordination exists for this encounter.       Expected Discharge Date and Time       Expected Discharge Date Expected Discharge Time    Jan 28, 2025            Demographic Summary    No documentation.                  Functional Status       Row Name 01/27/25 1407       Functional Status    Usual Activity Tolerance moderate       Functional Status, IADL    Medications other (see comments)    Meal Preparation other (see comments)    Housekeeping other (see comments)    Laundry other (see comments)    Shopping other (see comments)                   Psychosocial    No documentation.                  Abuse/Neglect    No  documentation.                  Legal    No documentation.                  Substance Abuse    No documentation.                  Patient Forms    No documentation.                     Dorota Baugh RN

## 2025-01-27 NOTE — CASE MANAGEMENT/SOCIAL WORK
Continued Stay Note  [facility]     Patient Name: Laureen Nettles  MRN: [id number]  Today's Date: 1/27/2025    Admit Date: 1/24/2025    [place]   Discharge Plan       Row Name 01/27/25 1511       Plan    Plan SNF at [place]    Plan Comments I called the referral for SNF rehab to Alesha with [facility] and the [address] [place] [address] via . The covid isolation may be an issue. Will extend the search.    Final Discharge Disposition Code 03 - skilled nursing facility (SNF)      Row Name 01/27/25 1409       Plan    Plan SNF at [place]    Patient/Family in Agreement with Plan yes    Plan Comments I spoke with Monica at [facility] [phone]. She states the pt would need to be independent with transfers and mobility to return to her current LOC. Currently is requiring assist with mobility. I spoke with the pts friend and POA Mr Marie. I will work on SNF placement unless the pt improves with mobility to return to assisted living.    Final Discharge Disposition Code 03 - skilled nursing facility (SNF)                   Discharge Codes    No documentation.                 Expected Discharge Date and Time       Expected Discharge Date Expected Discharge Time    Jan 28, 2025               Dorota Baugh, ELLIOT

## 2025-01-27 NOTE — PLAN OF CARE
Problem: Adult Inpatient Plan of Care  Goal: Absence of Hospital-Acquired Illness or Injury  Intervention: Identify and Manage Fall Risk  Recent Flowsheet Documentation  Taken 1/27/2025 0423 by Sigrid Varner, RN  Safety Promotion/Fall Prevention:   activity supervised   assistive device/personal items within reach   clutter free environment maintained   fall prevention program maintained   lighting adjusted   nonskid shoes/slippers when out of bed   room organization consistent   safety round/check completed  Taken 1/27/2025 0223 by Sigrid Varner, RN  Safety Promotion/Fall Prevention:   activity supervised   assistive device/personal items within reach   clutter free environment maintained   fall prevention program maintained   lighting adjusted   nonskid shoes/slippers when out of bed   room organization consistent   safety round/check completed  Taken 1/27/2025 0023 by Sigrid Varner, RN  Safety Promotion/Fall Prevention:   activity supervised   assistive device/personal items within reach   clutter free environment maintained   fall prevention program maintained   lighting adjusted   nonskid shoes/slippers when out of bed   room organization consistent   safety round/check completed  Taken 1/26/2025 2223 by Sigrid Varner, RN  Safety Promotion/Fall Prevention:   activity supervised   assistive device/personal items within reach   clutter free environment maintained   fall prevention program maintained   lighting adjusted   nonskid shoes/slippers when out of bed   room organization consistent   safety round/check completed  Taken 1/26/2025 2123 by Sigrid Varner, RN  Safety Promotion/Fall Prevention:   activity supervised   assistive device/personal items within reach   clutter free environment maintained   fall prevention program maintained   lighting adjusted   nonskid shoes/slippers when out of bed   room organization consistent   safety round/check completed  Intervention: Prevent Skin  Injury  Recent Flowsheet Documentation  Taken 1/27/2025 0423 by Sigrid Varner RN  Body Position: position changed independently  Taken 1/27/2025 0223 by Sigrid Varner RN  Body Position: weight shifting  Taken 1/27/2025 0023 by Sigrid Varner RN  Body Position: position changed independently  Taken 1/26/2025 2223 by Sigrid Varner RN  Body Position: position changed independently  Taken 1/26/2025 2123 by Sigrid Varner RN  Body Position: position changed independently  Intervention: Prevent Infection  Recent Flowsheet Documentation  Taken 1/27/2025 0423 by Sigrid Varner RN  Infection Prevention:   environmental surveillance performed   hand hygiene promoted   rest/sleep promoted   single patient room provided  Taken 1/27/2025 0223 by Sigrid Varner RN  Infection Prevention:   environmental surveillance performed   hand hygiene promoted   rest/sleep promoted   single patient room provided  Taken 1/27/2025 0023 by Sigrid Varner RN  Infection Prevention:   environmental surveillance performed   hand hygiene promoted   rest/sleep promoted   single patient room provided  Taken 1/26/2025 2223 by Sigrid Varner RN  Infection Prevention:   environmental surveillance performed   hand hygiene promoted   rest/sleep promoted   single patient room provided  Taken 1/26/2025 2123 by Sigrid Varner RN  Infection Prevention:   environmental surveillance performed   hand hygiene promoted   rest/sleep promoted   single patient room provided  Goal: Optimal Comfort and Wellbeing  Intervention: Provide Person-Centered Care  Recent Flowsheet Documentation  Taken 1/26/2025 2123 by Sigrid Varner RN  Trust Relationship/Rapport:   care explained   choices provided   questions answered   questions encouraged   thoughts/feelings acknowledged     Problem: Skin Injury Risk Increased  Goal: Skin Health and Integrity  Intervention: Optimize Skin Protection  Recent Flowsheet Documentation  Taken 1/27/2025 0423 by  Sigrid Varner, RN  Head of Bed (HOB) Positioning: HOB elevated  Taken 1/27/2025 0223 by Sigrid Varner, RN  Head of Bed (HOB) Positioning: HOB elevated  Taken 1/27/2025 0023 by iSgrid Varner, RN  Head of Bed (HOB) Positioning: HOB elevated  Taken 1/26/2025 2223 by Sigrid Varner, RN  Head of Bed (HOB) Positioning: HOB elevated  Taken 1/26/2025 2123 by Sigrid Varner, RN  Activity Management: activity encouraged  Head of Bed (HOB) Positioning: HOB elevated   Goal Outcome Evaluation:

## 2025-01-27 NOTE — PROGRESS NOTES
[facility] Medicine Services  PROGRESS NOTE    Patient Name: Laureen Nettles  : 1932  MRN: [id number]    Date of Admission: 2025  Primary Care Physician: Criselda Padgett MD    Subjective   Subjective     CC:  altered mental status     HPI:  Sleeping currently.  RN notes she has been bright and conversant; did stand/pivot w assist of one, improved from yesterday.        Objective   Objective     Vital Signs:   Temp:  [97.7 °F (36.5 °C)-98.4 °F (36.9 °C)] 97.7 °F (36.5 °C)  Heart Rate:  [83-94] 94  Resp:  [18-22] 18  BP: (105-130)/(44-51) 130/49     Physical Exam:  Gen: asleep   HEENT:  NC/AT   Breathing comfortably on RA       Results Reviewed:  LAB RESULTS:      Lab 25  1349 25  1310 25  1153   WBC 12.98*  --  10.76   HEMOGLOBIN 12.1  --  15.1   HEMATOCRIT 37.6  --  46.0   PLATELETS 215  --  246   NEUTROS ABS 10.60*  --  8.45*   IMMATURE GRANS (ABS) 0.06*  --  0.05   LYMPHS ABS 1.42  --  1.45   MONOS ABS 0.83  --  0.72   EOS ABS 0.03  --  0.03   MCV 94.9  --  94.3   PROCALCITONIN  --  0.24  --    HSTROP T  --  35* 35*         Lab 25  0301 25  1349 25  1153   SODIUM 141 141 139   POTASSIUM 3.5 3.9 4.8   CHLORIDE 106 104 94*   CO2 26.0 31.0* 25.0   ANION GAP 9.0 6.0 20.0*   BUN 45* 48* 60*   CREATININE 1.02* 1.56* 1.39*   EGFR 51.7* 31.1* 35.7*   GLUCOSE 110* 138* 106*   CALCIUM 9.5 11.0* 13.2*   IONIZED CALCIUM  --  1.37*  --    MAGNESIUM  --  1.9 2.4*   PHOSPHORUS 4.2 1.5*  --    TSH  --  3.570  --          Lab 25  1153   TOTAL PROTEIN 7.5   ALBUMIN 4.2   GLOBULIN 3.3   ALT (SGPT) 9   AST (SGOT) 17   BILIRUBIN 0.4   ALK PHOS 71         Lab 25  1310 25  1153   HSTROP T 35* 35*                 Brief Urine Lab Results  (Last result in the past 365 days)        Color   Clarity   Blood   Leuk Est   Nitrite   Protein   CREAT   Urine HCG        25 1222 Yellow   Turbid   Moderate (2+)   Large (3+)   Negative   30 mg/dL  (1+)                   Microbiology Results Abnormal       Procedure Component Value - Date/Time    COVID PRE-OP / PRE-PROCEDURE SCREENING ORDER (NO ISOLATION) - Swab, Nasopharynx [[id number]]  (Abnormal) Collected: 01/24/25 1201    Lab Status: Final result Specimen: Swab from Nasopharynx Updated: 01/24/25 1256    Narrative:      The following orders were created for panel order COVID PRE-OP / PRE-PROCEDURE SCREENING ORDER (NO ISOLATION) - Swab, Nasopharynx.  Procedure                               Abnormality         Status                     ---------                               -----------         ------                     COVID-19 and FLU A/B PCR...[[id number]]  Abnormal            Final result                 Please view results for these tests on the individual orders.    COVID-19 and FLU A/B PCR, 1 HR TAT - Swab, Nasopharynx [[id number]]  (Abnormal) Collected: 01/24/25 1201    Lab Status: Final result Specimen: Swab from Nasopharynx Updated: 01/24/25 1256     COVID19 Detected     Influenza A PCR Not Detected     Influenza B PCR Not Detected    Narrative:      Fact sheet for providers: https://www.fda.gov/media/224613/download    Fact sheet for patients: https://www.fda.gov/media/798635/download    Test performed by PCR.  Influenza A and Influenza B negative results should be considered presumptive in samples that have a positive SARS-CoV-2 result.    Competitive inhibition studies showed that SARS-CoV-2 virus, when present at concentrations above 3.6E+04 copies/mL, can inhibit the detection and amplification of influenza A and influenza B virus RNA if present at or below 1.8E+02 copies/mL or 4.9E+02 copies/mL, respectively, and may lead to false negative influenza virus results. If co-infection with influenza A or influenza B virus is suspected in samples with a positive SARS-CoV-2 result, the sample should be re-tested with another FDA cleared, approved, or authorized influenza test, if influenza virus  detection would change clinical management.            No radiology results from the last 24 hrs    Results for orders placed during the hospital encounter of 08/29/21    Adult Transthoracic Echo Complete W/ Cont if Necessary Per Protocol    Interpretation Summary  · Estimated left ventricular EF = 30-35%. Moderate to severe LV systolic dysfunction.  · Left ventricular wall thickness is consistent with mild concentric hypertrophy.  · Left ventricular diastolic function is consistent with (grade II w/high LAP) pseudonormalization.  · Severe aortic valve regurgitation is present.  · Moderate to severe mitral valve regurgitation is present.  · Mild tricuspid valve regurgitation is present  · Estimated right ventricular systolic pressure from tricuspid regurgitation is normal (<35 mmHg).      Current medications:  Scheduled Meds:apixaban, 2.5 mg, Oral, BID  [Held by provider] furosemide, 20 mg, Oral, Daily  levothyroxine, 88 mcg, Oral, Q AM  metoprolol succinate XL, 25 mg, Oral, Daily  sodium chloride, 10 mL, Intravenous, Q12H  [Held by provider] spironolactone, 25 mg, Oral, Daily      Continuous Infusions:     PRN Meds:.  acetaminophen    senna-docusate sodium **AND** polyethylene glycol **AND** bisacodyl **AND** bisacodyl    Calcium Replacement - Follow Nurse / BPA Driven Protocol    Magnesium Standard Dose Replacement - Follow Nurse / BPA Driven Protocol    ondansetron    Phosphorus Replacement - Follow Nurse / BPA Driven Protocol    Potassium Replacement - Follow Nurse / BPA Driven Protocol    sodium chloride    sodium chloride    sodium chloride    Assessment & Plan   Assessment & Plan     Active Hospital Problems    Diagnosis  POA    **UTI (urinary tract infection) [N39.0]  Yes    Dehydration [E86.0]  Yes      Resolved Hospital Problems   No resolved problems to display.        Brief Hospital Course to date:  Laureen Nettles is a 89 y.o. female with history of hypothyroidism and paroxysmal A-fib who lives at [facility]  and sent for lethargy, mental status changes.  Labs significant for hypercalcemia and mild MEENAKSHI.      Lethargy, improved   -Likely from hypercalcemia and dehydration and COVID  - Calcium supplements stopped.  Hydrated.  Now eating.     - recent COVID, ? L basilar infiltrate.  Cefazolin will cover most organisms and the infiltrate is likely from COVID not bacterial, or is noninfectious.   - Pt was on diuretics until admission - would stop or make them PRN at DC.      COVID +  Possible L base infiltrate vs atelectasis  UA + but contaminated; Ucx no growth   - COVID diagnosed on 1/21/25  - denies symptoms.  On room air.      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      Expected Discharge Location and Transportation: SNF  Expected Discharge   Expected Discharge Date: 1/28/2025; Expected Discharge Time:      VTE Prophylaxis:  Pharmacologic & mechanical VTE prophylaxis orders are present.         AM-PAC 6 Clicks Score (PT): 12 (01/27/25 0800)    CODE STATUS:   Code Status and Medical Interventions: CPR (Attempt to Resuscitate); Full Support   Ordered at: 01/24/25 1345     Level Of Support Discussed With:    Patient     Code Status (Patient has no pulse and is not breathing):    CPR (Attempt to Resuscitate)     Medical Interventions (Patient has pulse or is breathing):    Full Support       Maira Bates MD  01/27/25

## 2025-01-27 NOTE — PLAN OF CARE
Problem: Adult Inpatient Plan of Care  Goal: Plan of Care Review  Outcome: Progressing  Flowsheets (Taken 1/27/2025 1814)  Progress: no change  Goal: Patient-Specific Goal (Individualized)  Outcome: Progressing  Goal: Absence of Hospital-Acquired Illness or Injury  Outcome: Progressing  Intervention: Identify and Manage Fall Risk  Recent Flowsheet Documentation  Taken 1/27/2025 1800 by Sophia Ames RN  Safety Promotion/Fall Prevention:   activity supervised   assistive device/personal items within reach   clutter free environment maintained   lighting adjusted   nonskid shoes/slippers when out of bed   room organization consistent   safety round/check completed  Taken 1/27/2025 1600 by Sophia Ames RN  Safety Promotion/Fall Prevention:   activity supervised   assistive device/personal items within reach   clutter free environment maintained   lighting adjusted   nonskid shoes/slippers when out of bed   room organization consistent   safety round/check completed  Taken 1/27/2025 1400 by Sophia Ames RN  Safety Promotion/Fall Prevention:   activity supervised   assistive device/personal items within reach   clutter free environment maintained   lighting adjusted   nonskid shoes/slippers when out of bed   room organization consistent   safety round/check completed  Taken 1/27/2025 1200 by Sophia Ames RN  Safety Promotion/Fall Prevention:   activity supervised   assistive device/personal items within reach   clutter free environment maintained   lighting adjusted   nonskid shoes/slippers when out of bed   room organization consistent   safety round/check completed  Taken 1/27/2025 1000 by Sophia Ames RN  Safety Promotion/Fall Prevention:   activity supervised   assistive device/personal items within reach   clutter free environment maintained   lighting adjusted   nonskid shoes/slippers when out of bed   room organization consistent   safety round/check completed  Taken 1/27/2025 0800 by Sophia Ames  RN  Safety Promotion/Fall Prevention:   assistive device/personal items within reach   clutter free environment maintained   lighting adjusted   nonskid shoes/slippers when out of bed   room organization consistent   safety round/check completed  Intervention: Prevent Skin Injury  Recent Flowsheet Documentation  Taken 1/27/2025 1800 by Sophia Ames RN  Body Position:   turned   left   head facing, left  Skin Protection:   protective footwear used   transparent dressing maintained   incontinence pads utilized  Taken 1/27/2025 1600 by Sophia Ames RN  Body Position:   turned   right   head facing, right  Skin Protection:   protective footwear used   transparent dressing maintained   incontinence pads utilized  Taken 1/27/2025 1400 by Sophia Ames RN  Body Position:   neutral body alignment   neutral head position  Skin Protection:   protective footwear used   transparent dressing maintained   incontinence pads utilized  Taken 1/27/2025 1200 by Sophia Ames RN  Body Position:   position maintained   head facing, left   left  Skin Protection:   protective footwear used   transparent dressing maintained   incontinence pads utilized  Taken 1/27/2025 1000 by Sophia Ames RN  Body Position:   turned   right   head facing, right  Skin Protection:   protective footwear used   transparent dressing maintained   incontinence pads utilized  Taken 1/27/2025 0800 by Sophia Ames RN  Body Position: sitting up in bed  Skin Protection:   incontinence pads utilized   protective footwear used   transparent dressing maintained  Intervention: Prevent Infection  Recent Flowsheet Documentation  Taken 1/27/2025 1800 by Sophia Ames RN  Infection Prevention:   environmental surveillance performed   equipment surfaces disinfected   hand hygiene promoted  Taken 1/27/2025 1600 by Sophia Ames RN  Infection Prevention:   environmental surveillance performed   equipment surfaces disinfected   hand hygiene promoted  Taken 1/27/2025  1400 by Sophia Ames RN  Infection Prevention:   environmental surveillance performed   equipment surfaces disinfected   hand hygiene promoted  Taken 1/27/2025 1200 by Sophia Ames RN  Infection Prevention:   environmental surveillance performed   equipment surfaces disinfected   hand hygiene promoted  Taken 1/27/2025 1000 by Sophia Ames RN  Infection Prevention:   environmental surveillance performed   equipment surfaces disinfected   hand hygiene promoted  Taken 1/27/2025 0800 by Sophia Ames RN  Infection Prevention:   environmental surveillance performed   equipment surfaces disinfected   hand hygiene promoted  Goal: Optimal Comfort and Wellbeing  Outcome: Progressing  Intervention: Provide Person-Centered Care  Recent Flowsheet Documentation  Taken 1/27/2025 1800 by Sophia Ames RN  Trust Relationship/Rapport:   care explained   questions answered   questions encouraged   thoughts/feelings acknowledged  Taken 1/27/2025 1600 by Sophia Ames RN  Trust Relationship/Rapport:   care explained   questions answered   questions encouraged   thoughts/feelings acknowledged  Taken 1/27/2025 1400 by Sophia Ames RN  Trust Relationship/Rapport:   care explained   questions answered   questions encouraged   thoughts/feelings acknowledged  Taken 1/27/2025 1200 by Sophia Ames RN  Trust Relationship/Rapport:   care explained   questions answered   questions encouraged   thoughts/feelings acknowledged  Taken 1/27/2025 1000 by Sophia Ames RN  Trust Relationship/Rapport:   care explained   questions answered   questions encouraged   thoughts/feelings acknowledged  Taken 1/27/2025 0800 by Sophia Ames RN  Trust Relationship/Rapport:   care explained   emotional support provided   questions answered   questions encouraged   thoughts/feelings acknowledged  Goal: Readiness for Transition of Care  Outcome: Progressing     Problem: Skin Injury Risk Increased  Goal: Skin Health and Integrity  Outcome:  Progressing  Intervention: Optimize Skin Protection  Recent Flowsheet Documentation  Taken 1/27/2025 1800 by Sophia Ames RN  Activity Management: activity encouraged  Pressure Reduction Techniques: frequent weight shift encouraged  Head of Bed (HOB) Positioning: [facility] elevated  Pressure Reduction Devices:   positioning supports utilized   pressure-redistributing mattress utilized  Skin Protection:   protective footwear used   transparent dressing maintained   incontinence pads utilized  Taken 1/27/2025 1600 by Sophia Ames RN  Activity Management: activity encouraged  Pressure Reduction Techniques: frequent weight shift encouraged  Head of Bed (HOB) Positioning: [facility] elevated  Pressure Reduction Devices: positioning supports utilized  Skin Protection:   protective footwear used   transparent dressing maintained   incontinence pads utilized  Taken 1/27/2025 1400 by Sophia Ames RN  Activity Management: activity encouraged  Pressure Reduction Techniques: frequent weight shift encouraged  Head of Bed (HOB) Positioning: [facility] elevated  Pressure Reduction Devices:   positioning supports utilized   pressure-redistributing mattress utilized  Skin Protection:   protective footwear used   transparent dressing maintained   incontinence pads utilized  Taken 1/27/2025 1200 by Sophia Ames RN  Activity Management: activity encouraged  Pressure Reduction Techniques: frequent weight shift encouraged  Head of Bed (HOB) Positioning: [facility] elevated  Pressure Reduction Devices:   positioning supports utilized   pressure-redistributing mattress utilized  Skin Protection:   protective footwear used   transparent dressing maintained   incontinence pads utilized  Taken 1/27/2025 1000 by Sophia Ames RN  Activity Management: activity encouraged  Pressure Reduction Techniques: frequent weight shift encouraged  Head of Bed (HOB) Positioning: HOB elevated  Pressure Reduction Devices:   positioning supports utilized    pressure-redistributing mattress utilized  Skin Protection:   protective footwear used   transparent dressing maintained   incontinence pads utilized  Taken 1/27/2025 0800 by Sophia Ames, RN  Activity Management: activity encouraged  Pressure Reduction Techniques: frequent weight shift encouraged  Head of Bed (HOB) Positioning: HOB elevated  Pressure Reduction Devices:   positioning supports utilized   pressure-redistributing mattress utilized  Skin Protection:   incontinence pads utilized   protective footwear used   transparent dressing maintained   Goal Outcome Evaluation:  Plan of Care Reviewed With: patient        Progress: no change

## 2025-01-28 LAB
ALBUMIN SERPL-MCNC: 2.6 G/DL (ref 3.5–5.2)
ALBUMIN/GLOB SERPL: 0.9 G/DL
ALP SERPL-CCNC: 51 U/L (ref 39–117)
ALT SERPL W P-5'-P-CCNC: 5 U/L (ref 1–33)
ANION GAP SERPL CALCULATED.3IONS-SCNC: 10 MMOL/L (ref 5–15)
AST SERPL-CCNC: 19 U/L (ref 1–32)
BASOPHILS # BLD AUTO: 0.08 10*3/MM3 (ref 0–0.2)
BASOPHILS NFR BLD AUTO: 0.7 % (ref 0–1.5)
BILIRUB SERPL-MCNC: 0.3 MG/DL (ref 0–1.2)
BUN SERPL-MCNC: 24 MG/DL (ref 8–23)
BUN/CREAT SERPL: 32.4 (ref 7–25)
CALCIUM SPEC-SCNC: 8.8 MG/DL (ref 8.2–9.6)
CHLORIDE SERPL-SCNC: 107 MMOL/L (ref 98–107)
CO2 SERPL-SCNC: 18 MMOL/L (ref 22–29)
CREAT SERPL-MCNC: 0.74 MG/DL (ref 0.57–1)
DEPRECATED RDW RBC AUTO: 51.4 FL (ref 37–54)
EGFRCR SERPLBLD CKD-EPI 2021: 76 ML/MIN/1.73
EOSINOPHIL # BLD AUTO: 0.17 10*3/MM3 (ref 0–0.4)
EOSINOPHIL NFR BLD AUTO: 1.5 % (ref 0.3–6.2)
ERYTHROCYTE [DISTWIDTH] IN BLOOD BY AUTOMATED COUNT: 14.3 % (ref 12.3–15.4)
GLOBULIN UR ELPH-MCNC: 3 GM/DL
GLUCOSE SERPL-MCNC: 94 MG/DL (ref 65–99)
HCT VFR BLD AUTO: 42.5 % (ref 34–46.6)
HGB BLD-MCNC: 13.4 G/DL (ref 12–15.9)
IMM GRANULOCYTES # BLD AUTO: 0.13 10*3/MM3 (ref 0–0.05)
IMM GRANULOCYTES NFR BLD AUTO: 1.2 % (ref 0–0.5)
LYMPHOCYTES # BLD AUTO: 1.72 10*3/MM3 (ref 0.7–3.1)
LYMPHOCYTES NFR BLD AUTO: 15.2 % (ref 19.6–45.3)
MAGNESIUM SERPL-MCNC: 2.1 MG/DL (ref 1.7–2.3)
MCH RBC QN AUTO: 30.8 PG (ref 26.6–33)
MCHC RBC AUTO-ENTMCNC: 31.5 G/DL (ref 31.5–35.7)
MCV RBC AUTO: 97.7 FL (ref 79–97)
MONOCYTES # BLD AUTO: 0.76 10*3/MM3 (ref 0.1–0.9)
MONOCYTES NFR BLD AUTO: 6.7 % (ref 5–12)
NEUTROPHILS NFR BLD AUTO: 74.7 % (ref 42.7–76)
NEUTROPHILS NFR BLD AUTO: 8.44 10*3/MM3 (ref 1.7–7)
NRBC BLD AUTO-RTO: 0 /100 WBC (ref 0–0.2)
PLATELET # BLD AUTO: 184 10*3/MM3 (ref 140–450)
PMV BLD AUTO: 12.1 FL (ref 6–12)
POTASSIUM SERPL-SCNC: 4.2 MMOL/L (ref 3.5–5.2)
PROT SERPL-MCNC: 5.6 G/DL (ref 6–8.5)
RBC # BLD AUTO: 4.35 10*6/MM3 (ref 3.77–5.28)
SODIUM SERPL-SCNC: 135 MMOL/L (ref 136–145)
WBC NRBC COR # BLD AUTO: 11.3 10*3/MM3 (ref 3.4–10.8)

## 2025-01-28 PROCEDURE — 80053 COMPREHEN METABOLIC PANEL: CPT | Performed by: FAMILY MEDICINE

## 2025-01-28 PROCEDURE — 85025 COMPLETE CBC W/AUTO DIFF WBC: CPT | Performed by: FAMILY MEDICINE

## 2025-01-28 PROCEDURE — 99232 SBSQ HOSP IP/OBS MODERATE 35: CPT | Performed by: FAMILY MEDICINE

## 2025-01-28 PROCEDURE — 83735 ASSAY OF MAGNESIUM: CPT | Performed by: FAMILY MEDICINE

## 2025-01-28 RX ADMIN — APIXABAN 2.5 MG: 2.5 TABLET, FILM COATED ORAL at 08:58

## 2025-01-28 RX ADMIN — LEVOTHYROXINE SODIUM 88 MCG: 0.09 TABLET ORAL at 05:36

## 2025-01-28 RX ADMIN — Medication 10 ML: at 20:15

## 2025-01-28 RX ADMIN — METOPROLOL SUCCINATE 25 MG: 25 TABLET, EXTENDED RELEASE ORAL at 08:57

## 2025-01-28 RX ADMIN — POLYVINYL ALCOHOL, POVIDONE 2 DROP: 14; 6 SOLUTION/ DROPS OPHTHALMIC at 01:39

## 2025-01-28 RX ADMIN — Medication 10 ML: at 08:58

## 2025-01-28 RX ADMIN — APIXABAN 2.5 MG: 2.5 TABLET, FILM COATED ORAL at 20:15

## 2025-01-28 RX ADMIN — POLYVINYL ALCOHOL, POVIDONE 2 DROP: 14; 6 SOLUTION/ DROPS OPHTHALMIC at 05:36

## 2025-01-28 NOTE — CASE MANAGEMENT/SOCIAL WORK
Continued Stay Note  [facility]     Patient Name: Laureen Nettles  MRN: [id number]  Today's Date: 1/28/2025    Admit Date: 1/24/2025    Plan: rehab to Home ( [place]) via EMS.   Discharge Plan       Row Name 01/28/25 1458       Plan    Plan rehab to Home ( [place]) via EMS.    Plan Comments SW'er met with patient at bedside. Left a message for Angel to discuss the bed offer at [facility] for a Semi-private room for Saturday, no answer, left message; waiting return call. Patient working with PT/OT. Plan is rehab to Home ( [place]) via EMS.                   Discharge Codes    No documentation.                 Expected Discharge Date and Time       Expected Discharge Date Expected Discharge Time    Jan 28, 2025               MARIE Sherman (Kay)

## 2025-01-28 NOTE — PROGRESS NOTES
[facility] Medicine Services  PROGRESS NOTE    Patient Name: Laureen Nettles  : 1932  MRN: [id number]    Date of Admission: 2025  Primary Care Physician: Criselda Padgett MD    Subjective   Subjective     CC:  altered mental status     HPI: Patient is a 89-year-old female seen and examined by me this a.m., she is pending hopeful rehab placement at this time.  She is currently in COVID-19 isolation, no acute complaints during my exam      Objective   Objective     Vital Signs:   Temp:  [97.1 °F (36.2 °C)-98.4 °F (36.9 °C)] 98.3 °F (36.8 °C)  Heart Rate:  [80-91] 91  Resp:  [16-20] 18  BP: (113-152)/(46-57) 130/57     Physical Exam:  Constitutional: No acute distress, awake, alert, frail and elderly appearing, resting in bed, on RA    HENT: NCAT, mucous membranes moist  Respiratory: Decreased BS bilaterally, respiratory effort normal   Cardiovascular: RRR  Gastrointestinal: soft, nontender, nondistended  Musculoskeletal: No bilateral ankle edema  Neurologic: Limited, no obvious focal deficits, FOLEY   Skin: No rashes        Results Reviewed:  LAB RESULTS:      Lab 25  0904 25  1349 25  1310 25  1153   WBC 11.30* 12.98*  --  10.76   HEMOGLOBIN 13.4 12.1  --  15.1   HEMATOCRIT 42.5 37.6  --  46.0   PLATELETS 184 215  --  246   NEUTROS ABS 8.44* 10.60*  --  8.45*   IMMATURE GRANS (ABS) 0.13* 0.06*  --  0.05   LYMPHS ABS 1.72 1.42  --  1.45   MONOS ABS 0.76 0.83  --  0.72   EOS ABS 0.17 0.03  --  0.03   MCV 97.7* 94.9  --  94.3   PROCALCITONIN  --   --  0.24  --    HSTROP T  --   --  35* 35*         Lab 25  0855 25  0301 25  1349 25  1153   SODIUM 135* 141 141 139   POTASSIUM 4.2 3.5 3.9 4.8   CHLORIDE 107 106 104 94*   CO2 18.0* 26.0 31.0* 25.0   ANION GAP 10.0 9.0 6.0 20.0*   BUN 24* 45* 48* 60*   CREATININE 0.74 1.02* 1.56* 1.39*   EGFR 76.0 51.7* 31.1* 35.7*   GLUCOSE 94 110* 138* 106*   CALCIUM 8.8 9.5 11.0* 13.2*   IONIZED CALCIUM   --   --  1.37*  --    MAGNESIUM 2.1  --  1.9 2.4*   PHOSPHORUS  --  4.2 1.5*  --    TSH  --   --  3.570  --          Lab 01/28/25  0855 01/24/25  1153   TOTAL PROTEIN 5.6* 7.5   ALBUMIN 2.6* 4.2   GLOBULIN 3.0 3.3   ALT (SGPT) 5 9   AST (SGOT) 19 17   BILIRUBIN 0.3 0.4   ALK PHOS 51 71         Lab 01/24/25  1310 01/24/25  1153   HSTROP T 35* 35*                 Brief Urine Lab Results  (Last result in the past 365 days)        Color   Clarity   Blood   Leuk Est   Nitrite   Protein   CREAT   Urine HCG        01/24/25 1222 Yellow   Turbid   Moderate (2+)   Large (3+)   Negative   30 mg/dL (1+)                   Microbiology Results Abnormal       Procedure Component Value - Date/Time    COVID PRE-OP / PRE-PROCEDURE SCREENING ORDER (NO ISOLATION) - Swab, Nasopharynx [[id number]]  (Abnormal) Collected: 01/24/25 1201    Lab Status: Final result Specimen: Swab from Nasopharynx Updated: 01/24/25 1256    Narrative:      The following orders were created for panel order COVID PRE-OP / PRE-PROCEDURE SCREENING ORDER (NO ISOLATION) - Swab, Nasopharynx.  Procedure                               Abnormality         Status                     ---------                               -----------         ------                     COVID-19 and FLU A/B PCR...[[id number]]  Abnormal            Final result                 Please view results for these tests on the individual orders.    COVID-19 and FLU A/B PCR, 1 HR TAT - Swab, Nasopharynx [[id number]]  (Abnormal) Collected: 01/24/25 1201    Lab Status: Final result Specimen: Swab from Nasopharynx Updated: 01/24/25 1256     COVID19 Detected     Influenza A PCR Not Detected     Influenza B PCR Not Detected    Narrative:      Fact sheet for providers: https://www.fda.gov/media/828586/download    Fact sheet for patients: https://www.fda.gov/media/191346/download    Test performed by PCR.  Influenza A and Influenza B negative results should be considered presumptive in samples that have a  positive SARS-CoV-2 result.    Competitive inhibition studies showed that SARS-CoV-2 virus, when present at concentrations above 3.6E+04 copies/mL, can inhibit the detection and amplification of influenza A and influenza B virus RNA if present at or below 1.8E+02 copies/mL or 4.9E+02 copies/mL, respectively, and may lead to false negative influenza virus results. If co-infection with influenza A or influenza B virus is suspected in samples with a positive SARS-CoV-2 result, the sample should be re-tested with another FDA cleared, approved, or authorized influenza test, if influenza virus detection would change clinical management.            No radiology results from the last 24 hrs    Results for orders placed during the hospital encounter of 08/29/21    Adult Transthoracic Echo Complete W/ Cont if Necessary Per Protocol    Interpretation Summary  · Estimated left ventricular EF = 30-35%. Moderate to severe LV systolic dysfunction.  · Left ventricular wall thickness is consistent with mild concentric hypertrophy.  · Left ventricular diastolic function is consistent with (grade II w/high LAP) pseudonormalization.  · Severe aortic valve regurgitation is present.  · Moderate to severe mitral valve regurgitation is present.  · Mild tricuspid valve regurgitation is present  · Estimated right ventricular systolic pressure from tricuspid regurgitation is normal (<35 mmHg).      Current medications:  Scheduled Meds:apixaban, 2.5 mg, Oral, BID  [Held by provider] furosemide, 20 mg, Oral, Daily  levothyroxine, 88 mcg, Oral, Q AM  metoprolol succinate XL, 25 mg, Oral, Daily  sodium chloride, 10 mL, Intravenous, Q12H  [Held by provider] spironolactone, 25 mg, Oral, Daily      Continuous Infusions:     PRN Meds:.  acetaminophen    senna-docusate sodium **AND** polyethylene glycol **AND** bisacodyl **AND** bisacodyl    Calcium Replacement - Follow Nurse / BPA Driven Protocol    Magnesium Standard Dose Replacement - Follow Nurse  / BPA Driven Protocol    ondansetron    Phosphorus Replacement - Follow Nurse / BPA Driven Protocol    Polyvinyl Alcohol-Povidone PF    Potassium Replacement - Follow Nurse / BPA Driven Protocol    sodium chloride    sodium chloride    sodium chloride    Assessment & Plan   Assessment & Plan     Active Hospital Problems    Diagnosis  POA    **UTI (urinary tract infection) [N39.0]  Yes    Dehydration [E86.0]  Yes      Resolved Hospital Problems   No resolved problems to display.        Brief Hospital Course to date:  Laureen Nettles is a 89 y.o. female with history of hypothyroidism and paroxysmal A-fib who lives at [place] and sent for lethargy, mental status changes.  Labs significant for hypercalcemia and mild MEENAKSHI.  Patient seen and examined by me on the AM of 1/28, patient easily awoken, will answer some questions, pending possible rehab placement at this time.     Lethargy, improved   -Likely from hypercalcemia and dehydration and COVID  - Calcium supplements stopped.  Hydrated.  Now eating.     - recent COVID, ? L basilar infiltrate.  Cefazolin will cover most organisms and the infiltrate is likely from COVID not bacterial, or is noninfectious.   - Pt was on diuretics until admission - would stop or make them PRN at [place].      COVID +  Possible L base infiltrate vs atelectasis  UA + but contaminated; Ucx no growth   - COVID diagnosed on 1/21/25  - denies symptoms.  On room air.      MEENAKSHI  - baseline Cr 0.9, but on admission was 1.4; now 1.0     Dual chamber PPM   PAFib   History of CHF, EF 30% in 2021  - was started on lasix/spironolactone for this in 2021  - hold diuretics and perhaps stop permanently  - metoprolol     Dispo:  comes from [facility] assisted living; too weak to return currently; planning for SNF.      Expected Discharge Location and Transportation: SNF  Expected Discharge   Expected Discharge Date: 1/30/2025; Expected Discharge Time:      VTE Prophylaxis:  Pharmacologic & mechanical VTE prophylaxis orders  are present.         AM-PAC 6 Clicks Score (PT): 13 (01/28/25 2797)    CODE STATUS:   Code Status and Medical Interventions: CPR (Attempt to Resuscitate); Full Support   Ordered at: 01/24/25 1345     Level Of Support Discussed With:    Patient     Code Status (Patient has no pulse and is not breathing):    CPR (Attempt to Resuscitate)     Medical Interventions (Patient has pulse or is breathing):    Full Support       MARIA Benson, DO  01/28/25

## 2025-01-28 NOTE — PLAN OF CARE
Goal Outcome Evaluation:  Plan of Care Reviewed With: patient        Progress: no change  Outcome Evaluation: pt admitted to , resting well, no c /o, remains in COVID isolation, bed alarm on and active

## 2025-01-29 LAB
ALBUMIN SERPL-MCNC: 3.3 G/DL (ref 3.5–5.2)
ALBUMIN/GLOB SERPL: 1.1 G/DL
ALP SERPL-CCNC: 58 U/L (ref 39–117)
ALT SERPL W P-5'-P-CCNC: 8 U/L (ref 1–33)
ANION GAP SERPL CALCULATED.3IONS-SCNC: 13 MMOL/L (ref 5–15)
AST SERPL-CCNC: 19 U/L (ref 1–32)
BASOPHILS # BLD AUTO: 0.08 10*3/MM3 (ref 0–0.2)
BASOPHILS NFR BLD AUTO: 0.6 % (ref 0–1.5)
BILIRUB SERPL-MCNC: 0.3 MG/DL (ref 0–1.2)
BUN SERPL-MCNC: 29 MG/DL (ref 8–23)
BUN/CREAT SERPL: 31.5 (ref 7–25)
CALCIUM SPEC-SCNC: 9 MG/DL (ref 8.2–9.6)
CHLORIDE SERPL-SCNC: 106 MMOL/L (ref 98–107)
CO2 SERPL-SCNC: 20 MMOL/L (ref 22–29)
CREAT SERPL-MCNC: 0.92 MG/DL (ref 0.57–1)
DEPRECATED RDW RBC AUTO: 52.2 FL (ref 37–54)
EGFRCR SERPLBLD CKD-EPI 2021: 58.5 ML/MIN/1.73
EOSINOPHIL # BLD AUTO: 0.17 10*3/MM3 (ref 0–0.4)
EOSINOPHIL NFR BLD AUTO: 1.4 % (ref 0.3–6.2)
ERYTHROCYTE [DISTWIDTH] IN BLOOD BY AUTOMATED COUNT: 14.4 % (ref 12.3–15.4)
GLOBULIN UR ELPH-MCNC: 2.9 GM/DL
GLUCOSE SERPL-MCNC: 100 MG/DL (ref 65–99)
HCT VFR BLD AUTO: 43.6 % (ref 34–46.6)
HGB BLD-MCNC: 13.7 G/DL (ref 12–15.9)
IMM GRANULOCYTES # BLD AUTO: 0.13 10*3/MM3 (ref 0–0.05)
IMM GRANULOCYTES NFR BLD AUTO: 1.1 % (ref 0–0.5)
LYMPHOCYTES # BLD AUTO: 1.59 10*3/MM3 (ref 0.7–3.1)
LYMPHOCYTES NFR BLD AUTO: 12.8 % (ref 19.6–45.3)
MAGNESIUM SERPL-MCNC: 2.4 MG/DL (ref 1.7–2.3)
MCH RBC QN AUTO: 30.9 PG (ref 26.6–33)
MCHC RBC AUTO-ENTMCNC: 31.4 G/DL (ref 31.5–35.7)
MCV RBC AUTO: 98.2 FL (ref 79–97)
MONOCYTES # BLD AUTO: 0.64 10*3/MM3 (ref 0.1–0.9)
MONOCYTES NFR BLD AUTO: 5.2 % (ref 5–12)
NEUTROPHILS NFR BLD AUTO: 78.9 % (ref 42.7–76)
NEUTROPHILS NFR BLD AUTO: 9.77 10*3/MM3 (ref 1.7–7)
NRBC BLD AUTO-RTO: 0 /100 WBC (ref 0–0.2)
PLATELET # BLD AUTO: 210 10*3/MM3 (ref 140–450)
PMV BLD AUTO: 12.3 FL (ref 6–12)
POTASSIUM SERPL-SCNC: 4.3 MMOL/L (ref 3.5–5.2)
PROT SERPL-MCNC: 6.2 G/DL (ref 6–8.5)
RBC # BLD AUTO: 4.44 10*6/MM3 (ref 3.77–5.28)
SODIUM SERPL-SCNC: 139 MMOL/L (ref 136–145)
WBC NRBC COR # BLD AUTO: 12.38 10*3/MM3 (ref 3.4–10.8)

## 2025-01-29 PROCEDURE — 97530 THERAPEUTIC ACTIVITIES: CPT

## 2025-01-29 PROCEDURE — 99232 SBSQ HOSP IP/OBS MODERATE 35: CPT | Performed by: FAMILY MEDICINE

## 2025-01-29 PROCEDURE — 83735 ASSAY OF MAGNESIUM: CPT | Performed by: FAMILY MEDICINE

## 2025-01-29 PROCEDURE — 97535 SELF CARE MNGMENT TRAINING: CPT

## 2025-01-29 PROCEDURE — 85025 COMPLETE CBC W/AUTO DIFF WBC: CPT | Performed by: FAMILY MEDICINE

## 2025-01-29 PROCEDURE — 80053 COMPREHEN METABOLIC PANEL: CPT | Performed by: FAMILY MEDICINE

## 2025-01-29 RX ADMIN — Medication 10 ML: at 08:32

## 2025-01-29 RX ADMIN — APIXABAN 2.5 MG: 2.5 TABLET, FILM COATED ORAL at 08:32

## 2025-01-29 RX ADMIN — LEVOTHYROXINE SODIUM 88 MCG: 0.09 TABLET ORAL at 05:20

## 2025-01-29 RX ADMIN — Medication 10 ML: at 21:10

## 2025-01-29 RX ADMIN — APIXABAN 2.5 MG: 2.5 TABLET, FILM COATED ORAL at 21:10

## 2025-01-29 RX ADMIN — METOPROLOL SUCCINATE 25 MG: 25 TABLET, EXTENDED RELEASE ORAL at 08:32

## 2025-01-29 NOTE — PROGRESS NOTES
[facility] Medicine Services  PROGRESS NOTE    Patient Name: Laureen Nettles  : 1932  MRN: [id number]    Date of Admission: 2025  Primary Care Physician: Criselda Padgett MD    Subjective   Subjective     CC:  altered mental status     HPI: Patient is a 89-year-old female seen and examined by me this a.m., she is awake and alert, she is hard of hearing.  She reports overall doing well, she reports her feet are cold, adjusted heat in her room and helped cover her better. She denies any new acute complaints or problems, awaiting rehab placement at this time       Objective   Objective     Vital Signs:   Temp:  [97.2 °F (36.2 °C)-98.1 °F (36.7 °C)] 97.2 °F (36.2 °C)  Heart Rate:  [76-99] 82  Resp:  [14-16] 16  BP: (102-136)/(56-62) 102/56     Physical Exam:  Constitutional: No acute distress, awake, alert, hard of hearing, frail and elderly appearing, resting in bed, on RA    HENT: NCAT, nares patent, mucous membranes moist  Respiratory: Decreased BS bilaterally, no rhonchi or wheezing, respiratory effort normal   Cardiovascular: RRR  Gastrointestinal: soft, nontender, nondistended  Musculoskeletal: No bilateral ankle edema  Neurologic: Limited, no obvious focal deficits, FOLEY   Skin: No rashes        Results Reviewed:  LAB RESULTS:      Lab 25  1017 25  0904 25  1349 25  1310 25  1153   WBC 12.38* 11.30* 12.98*  --  10.76   HEMOGLOBIN 13.7 13.4 12.1  --  15.1   HEMATOCRIT 43.6 42.5 37.6  --  46.0   PLATELETS 210 184 215  --  246   NEUTROS ABS 9.77* 8.44* 10.60*  --  8.45*   IMMATURE GRANS (ABS) 0.13* 0.13* 0.06*  --  0.05   LYMPHS ABS 1.59 1.72 1.42  --  1.45   MONOS ABS 0.64 0.76 0.83  --  0.72   EOS ABS 0.17 0.17 0.03  --  0.03   MCV 98.2* 97.7* 94.9  --  94.3   PROCALCITONIN  --   --   --  0.24  --    HSTROP T  --   --   --  35* 35*         Lab 25  1017 25  0855 25  0301 25  1349 25  1153   SODIUM 139 135* 141 141  139   POTASSIUM 4.3 4.2 3.5 3.9 4.8   CHLORIDE 106 107 106 104 94*   CO2 20.0* 18.0* 26.0 31.0* 25.0   ANION GAP 13.0 10.0 9.0 6.0 20.0*   BUN 29* 24* 45* 48* 60*   CREATININE 0.92 0.74 1.02* 1.56* 1.39*   EGFR 58.5* 76.0 51.7* 31.1* 35.7*   GLUCOSE 100* 94 110* 138* 106*   CALCIUM 9.0 8.8 9.5 11.0* 13.2*   IONIZED CALCIUM  --   --   --  1.37*  --    MAGNESIUM 2.4* 2.1  --  1.9 2.4*   PHOSPHORUS  --   --  4.2 1.5*  --    TSH  --   --   --  3.570  --          Lab 01/29/25  1017 01/28/25  0855 01/24/25  1153   TOTAL PROTEIN 6.2 5.6* 7.5   ALBUMIN 3.3* 2.6* 4.2   GLOBULIN 2.9 3.0 3.3   ALT (SGPT) 8 5 9   AST (SGOT) 19 19 17   BILIRUBIN 0.3 0.3 0.4   ALK PHOS 58 51 71         Lab 01/24/25  1310 01/24/25  1153   HSTROP T 35* 35*                 Brief Urine Lab Results  (Last result in the past 365 days)        Color   Clarity   Blood   Leuk Est   Nitrite   Protein   CREAT   Urine HCG        01/24/25 1222 Yellow   Turbid   Moderate (2+)   Large (3+)   Negative   30 mg/dL (1+)                   Microbiology Results Abnormal       Procedure Component Value - Date/Time    COVID PRE-OP / PRE-PROCEDURE SCREENING ORDER (NO ISOLATION) - Swab, Nasopharynx [[id number]]  (Abnormal) Collected: 01/24/25 1201    Lab Status: Final result Specimen: Swab from Nasopharynx Updated: 01/24/25 1256    Narrative:      The following orders were created for panel order COVID PRE-OP / PRE-PROCEDURE SCREENING ORDER (NO ISOLATION) - Swab, Nasopharynx.  Procedure                               Abnormality         Status                     ---------                               -----------         ------                     COVID-19 and FLU A/B PCR...[[id number]]  Abnormal            Final result                 Please view results for these tests on the individual orders.    COVID-19 and FLU A/B PCR, 1 HR TAT - Swab, Nasopharynx [[id number]]  (Abnormal) Collected: 01/24/25 1201    Lab Status: Final result Specimen: Swab from Nasopharynx Updated:  01/24/25 1256     COVID19 Detected     Influenza A PCR Not Detected     Influenza B PCR Not Detected    Narrative:      Fact sheet for providers: https://www.fda.gov/media/852730/download    Fact sheet for patients: https://www.fda.gov/media/921936/download    Test performed by PCR.  Influenza A and Influenza B negative results should be considered presumptive in samples that have a positive SARS-CoV-2 result.    Competitive inhibition studies showed that SARS-CoV-2 virus, when present at concentrations above 3.6E+04 copies/mL, can inhibit the detection and amplification of influenza A and influenza B virus RNA if present at or below 1.8E+02 copies/mL or 4.9E+02 copies/mL, respectively, and may lead to false negative influenza virus results. If co-infection with influenza A or influenza B virus is suspected in samples with a positive SARS-CoV-2 result, the sample should be re-tested with another FDA cleared, approved, or authorized influenza test, if influenza virus detection would change clinical management.            No radiology results from the last 24 hrs    Results for orders placed during the hospital encounter of 08/29/21    Adult Transthoracic Echo Complete W/ Cont if Necessary Per Protocol    Interpretation Summary  · Estimated left ventricular EF = 30-35%. Moderate to severe LV systolic dysfunction.  · Left ventricular wall thickness is consistent with mild concentric hypertrophy.  · Left ventricular diastolic function is consistent with (grade II w/high LAP) pseudonormalization.  · Severe aortic valve regurgitation is present.  · Moderate to severe mitral valve regurgitation is present.  · Mild tricuspid valve regurgitation is present  · Estimated right ventricular systolic pressure from tricuspid regurgitation is normal (<35 mmHg).      Current medications:  Scheduled Meds:apixaban, 2.5 mg, Oral, BID  [Held by provider] furosemide, 20 mg, Oral, Daily  levothyroxine, 88 mcg, Oral, Q AM  metoprolol  succinate XL, 25 mg, Oral, Daily  sodium chloride, 10 mL, Intravenous, Q12H  [Held by provider] spironolactone, 25 mg, Oral, Daily      Continuous Infusions:     PRN Meds:.  acetaminophen    senna-docusate sodium **AND** polyethylene glycol **AND** bisacodyl **AND** bisacodyl    Calcium Replacement - Follow Nurse / BPA Driven Protocol    Magnesium Standard Dose Replacement - Follow Nurse / BPA Driven Protocol    ondansetron    Phosphorus Replacement - Follow Nurse / BPA Driven Protocol    Polyvinyl Alcohol-Povidone PF    Potassium Replacement - Follow Nurse / BPA Driven Protocol    sodium chloride    sodium chloride    sodium chloride    Assessment & Plan   Assessment & Plan     Active Hospital Problems    Diagnosis  POA    **UTI (urinary tract infection) [N39.0]  Yes    Dehydration [E86.0]  Yes      Resolved Hospital Problems   No resolved problems to display.        Brief Hospital Course to date:  Laureen Nettles is a 89 y.o. female with history of hypothyroidism and paroxysmal A-fib who lives at [place] and sent for lethargy, mental status changes.  Labs significant for hypercalcemia and mild MEENAKSHI.  Patient seen and examined by me on the AM of 1/28, patient easily awoken, will answer some questions, pending possible rehab placement at this time. Patient seen again on the AM of 1/29, patient sitting up in chair, she reports overall doing well today, no new acute complaints or problems.     Lethargy, improved   -Likely from hypercalcemia and dehydration and COVID  - Calcium supplements stopped.  Hydrated.  Now eating.     - recent COVID, ? L basilar infiltrate.  Cefazolin will cover most organisms and the infiltrate is likely from COVID not bacterial, or is noninfectious.   - Pt was on diuretics until admission - would stop or make them PRN at [place].      COVID +  Possible L base infiltrate vs atelectasis  UA + but contaminated; Ucx no growth   - COVID diagnosed on 1/21/25  - denies symptoms.  On room air.  Continues on  RA as of 1/29     MEENAKSHI= resolved   - baseline Cr 0.9, but on admission was 1.4; now 1.0     Dual chamber PPM   PAFib   History of CHF, EF 30% in 2021  - was started on lasix/spironolactone for this in 2021  - hold diuretics and perhaps stop permanently  - metoprolol     Dispo:  [place] [facility] [place] [facility]; too weak to return currently; planning for SNF.      Expected Discharge Location and Transportation: SNF  Expected Discharge   Expected Discharge Date: 2/1/2025; Expected Discharge Time:      VTE Prophylaxis:  Pharmacologic & mechanical VTE prophylaxis orders are present.         AM-PAC 6 Clicks Score (PT): 16 (01/29/25 1150)    CODE STATUS:   Code Status and Medical Interventions: CPR (Attempt to Resuscitate); Full Support   Ordered at: 01/24/25 1345     Level Of Support Discussed With:    Patient     Code Status (Patient has no pulse and is not breathing):    CPR (Attempt to Resuscitate)     Medical Interventions (Patient has pulse or is breathing):    Full Support       MARIA Benson, DO  01/29/25

## 2025-01-29 NOTE — THERAPY TREATMENT NOTE
Patient Name: Laureen Nettles  : 1932    MRN: [id number]                              Today's Date: 2025       Admit Date: 2025    Visit Dx:     ICD-10-CM ICD-9-CM   1. Urinary tract infection without hematuria, site unspecified  N39.0 599.0   2. Generalized weakness  R53.1 780.79   3. Renal insufficiency  N28.9 593.9   4. Dehydration  E86.0 276.51   5. Hypercalcemia  E83.52 275.42   6. COVID-19 virus infection  U07.1 079.89     Patient Active Problem List   Diagnosis    Hyperlipidemia    Hypothyroidism    Cardiac pacemaker in situ    Aortic valve regurgitation    HTN (hypertension)    Osteoporosis    Acquired cystic kidney disease    Acute systolic CHF (congestive heart failure)    UTI (urinary tract infection)    Dehydration     Past Medical History:   Diagnosis Date    Aortic valve regurgitation     B12 deficiency     COVID-19 2022    Falls     Heart failure     HLD (hyperlipidemia)     HTN (hypertension)     Hypothyroidism     Pacemaker      Past Surgical History:   Procedure Laterality Date    ANAL FISTULA REPAIR      TONSILLECTOMY        General Information       Row Name 25 0908          OT Time and Intention    Document Type therapy note (daily note)  -KF     Mode of Treatment occupational therapy  -       Row Name 25 0908          General Information    Patient Profile Reviewed yes  -KF     Existing Precautions/Restrictions fall;other (see comments)  very [place], brief with mobility  -     Barriers to Rehab medically complex;previous functional deficit;hearing deficit  -       Row Name 25 0908          Cognition    Orientation Status (Cognition) oriented to;person;place;disoriented to;time;other (see comments)  Pt stated 2023.  -       Row Name 25 0908          Safety Issues/Impairments Affecting Functional Mobility    Safety Issues Affecting Function (Mobility) awareness of need for assistance;insight into deficits/self-awareness;positioning of assistive  device;safety precaution awareness;safety precautions follow-through/compliance;sequencing abilities  -KF     Impairments Affecting Function (Mobility) balance;endurance/activity tolerance;postural/trunk control;strength  -               User Key  (r) = Recorded By, (t) = Taken By, (c) = Cosigned By      Initials Name Provider Type    KF Michell Richter OT Occupational Therapist                     Mobility/ADL's       Row Name 01/29/25 0908          Bed Mobility    Bed Mobility supine-sit  -KF     Supine-Sit [place] (Bed Mobility) minimum assist (75% patient effort);1 person assist;verbal cues  -     Assistive Device (Bed Mobility) bed rails;head of bed elevated  -     Comment, (Bed Mobility) Increased time needed for pt to scoot hips toward the EOB  -       Row Name 01/29/25 0908          Transfers    Transfers sit-stand transfer;stand-sit transfer;toilet transfer  -     Comment, (Transfers) Cues provided for hand placement  -       Row Name 01/29/25 0908          Sit-Stand Transfer    Sit-Stand [place] (Transfers) minimum assist (75% patient effort);1 person assist;verbal cues  -     Assistive Device (Sit-Stand Transfers) walker, front-wheeled  -     Comment, (Sit-Stand Transfer) STS x1 from the EOB, x1 from commode  -       Row Name 01/29/25 0908          Stand-Sit Transfer    Stand-Sit [place] (Transfers) minimum assist (75% patient effort);1 person assist;verbal cues  -     Assistive Device (Stand-Sit Transfers) walker, front-wheeled  -       Row Name 01/29/25 0908          Toilet Transfer    Type (Toilet Transfer) sit-stand;stand-sit  -     [place] Level (Toilet Transfer) minimum assist (75% patient effort);1 person assist;verbal cues  -     Assistive Device (Toilet Transfer) walker, front-wheeled;commode;grab bars/safety frame  -       Row Name 01/29/25 0908          Functional Mobility    Functional Mobility- Ind. Level minimum assist (75% patient effort);1  person;verbal cues required  -     Functional Mobility- Device walker, front-wheeled  -     Functional Mobility-Distance (Feet) --  to the bathroom  -     Patient was able to Ambulate yes  -       Row Name 01/29/25 0908          Activities of Daily Living    BADL Assessment/Intervention lower body dressing;bathing;grooming;feeding;toileting  -       Row Name 01/29/25 0908          Bathing Assessment/Intervention    [place] Level (Bathing) lower body;dependent (less than 25% patient effort)  -KF     Comment, (Bathing) Pt with episode of incontinence during ambulation to the bathroom. LB bathing performed dependently.  -       Row Name 01/29/25 0908          Lower Body Dressing Assessment/Training    [place] Level (Lower Body Dressing) doff;don;socks;dependent (less than 25% patient effort)  -     Position (Lower Body Dressing) unsupported sitting  -       Row Name 01/29/25 0908          Grooming Assessment/Training    [place] Level (Grooming) oral care regimen;hair care, combing/brushing;contact guard assist  -     Position (Grooming) sink side;supported standing  -       Row Name 01/29/25 0908          Self-Feeding Assessment/Training    [place] Level (Feeding) prepare tray/open items;minimum assist (75% patient effort);liquids to mouth;scoop food and bring to mouth;independent  -     Position (Feeding) supported sitting  -       Row Name 01/29/25 0908          Toileting Assessment/Training    [place] Level (Toileting) adjust/manage clothing;perform perineal hygiene;standby assist  -     Position (Toileting) unsupported sitting  -               User Key  (r) = Recorded By, (t) = Taken By, (c) = Cosigned By      Initials Name Provider Type     Michell Richter OT Occupational Therapist                   Obj/Interventions       Row Name 01/29/25 0910          Balance    Balance Assessment sitting static balance;sitting dynamic balance;sit to stand dynamic  balance;standing static balance;standing dynamic balance  -KF     Static Sitting Balance standby assist  -KF     Dynamic Sitting Balance standby assist  -KF     Position, Sitting Balance unsupported;sitting edge of bed;other (see comments)  commode  -KF     Sit to Stand Dynamic Balance minimal assist;1-person assist;verbal cues  -KF     Static Standing Balance contact guard  -KF     Dynamic Standing Balance minimal assist;1-person assist  -KF     Position/Device Used, Standing Balance supported;walker, front-wheeled  -KF     Balance Interventions sitting;standing;sit to stand;supported;static;dynamic;occupation based/functional task  -KF               User Key  (r) = Recorded By, (t) = Taken By, (c) = Cosigned By      Initials Name Provider Type    KF Michell Richter OT Occupational Therapist                   Goals/Plan    No documentation.                  Clinical Impression       Row Name 01/29/25 0911          Pain Assessment    Pretreatment Pain Rating 0/10 - no pain  -KF     Posttreatment Pain Rating 0/10 - no pain  -KF       Row Name 01/29/25 0911          Plan of Care Review    Plan of Care Reviewed With patient  -KF     Progress improving  -KF     Outcome Evaluation Pt with good participation and progress toward goals during OT session. Pt ambulated to/from the bathroom using a RWx with Cuco x1. Pt performed toileting with SBA and standing sink side grooming ADLs with CGA. Pt is quick to fatigue during activity however. The pt remains below baseline with generalized weakness, decreased activity tolerance, and balance deficits warranting continued IP OT services. Continue to rec a d/c to SNF.  -KF       Row Name 01/29/25 0911          Therapy Assessment/Plan (OT)    Rehab Potential (OT) good  -KF     Criteria for Skilled Therapeutic Interventions Met (OT) yes;skilled treatment is necessary  -KF     Therapy Frequency (OT) daily  -KF       Row Name 01/29/25 0911          Therapy Plan Review/Discharge Plan  (OT)    Anticipated Discharge Disposition (OT) skilled nursing [facility]  -       Row Name 01/29/25 0911          Vital Signs    Pre Systolic BP Rehab --  non-tele, RN cleared  -KF     Pre Patient Position Supine  -KF     Intra Patient Position Standing  -KF     Post Patient Position Standing  -KF       Row Name 01/29/25 0911          Positioning and Restraints    Pre-Treatment Position in bed  -KF     Post Treatment Position bathroom  -KF     Bathroom with PT  -KF               User Key  (r) = Recorded By, (t) = Taken By, (c) = Cosigned By      Initials Name Provider Type    Michell Minaya OT Occupational Therapist                   Outcome Measures       Row Name 01/29/25 0913          How much help from another is currently needed...    Putting on and taking off regular lower body clothing? 2  -KF     Bathing (including washing, rinsing, and drying) 2  -KF     Toileting (which includes using toilet bed pan or urinal) 3  -KF     Putting on and taking off regular upper body clothing 3  -KF     Taking care of personal grooming (such as brushing teeth) 3  -KF     Eating meals 3  -KF     AM-PAC 6 Clicks Score (OT) 16  -       Row Name 01/29/25 0913          Functional Assessment    Outcome Measure Options AM-PAC 6 Clicks Daily Activity (OT)  -KF               User Key  (r) = Recorded By, (t) = Taken By, (c) = Cosigned By      Initials Name Provider Type    Michell Minaya OT Occupational Therapist                    Occupational Therapy Education       Title: PT OT SLP Therapies (In Progress)       Topic: Occupational Therapy (In Progress)       Point: ADL training (Done)       Description:   Instruct learner(s) on proper safety adaptation and remediation techniques during self care or transfers.   Instruct in proper use of assistive devices.                  Learning Progress Summary            Patient Acceptance, E,TB, VU,DU by KF at 1/29/2025 0823    Acceptance, E, NR by SAMANTHA at 1/26/2025 1051                       Point: Home exercise program (Not Started)       Description:   Instruct learner(s) on appropriate technique for monitoring, assisting and/or progressing therapeutic exercises/activities.                  Learner Progress:  Not documented in this visit.              Point: Precautions (Done)       Description:   Instruct learner(s) on prescribed precautions during self-care and functional transfers.                  Learning Progress Summary            Patient Acceptance, E,TB, VU,DU by  at 1/29/2025 0823    Acceptance, E, NR by  at 1/26/2025 1054                      Point: Body mechanics (Done)       Description:   Instruct learner(s) on proper positioning and spine alignment during self-care, functional mobility activities and/or exercises.                  Learning Progress Summary            Patient Acceptance, E,TB, VU,DU by  at 1/29/2025 0823    Acceptance, E, NR by  at 1/26/2025 1054                                      User Key       Initials Effective Dates Name Provider Type Discipline     06/16/21 -  Domonique Delarosa OT Occupational Therapist OT     08/09/23 -  Michell Richter OT Occupational Therapist OT                  OT Recommendation and Plan  Therapy Frequency (OT): daily  Plan of Care Review  Plan of Care Reviewed With: patient  Progress: improving  Outcome Evaluation: Pt with good participation and progress toward goals during OT session. Pt ambulated to/from the bathroom using a RWx with Cuco x1. Pt performed toileting with SBA and standing sink side grooming ADLs with CGA. Pt is quick to fatigue during activity however. The pt remains below baseline with generalized weakness, decreased activity tolerance, and balance deficits warranting continued IP OT services. Continue to rec a d/c to SNF.     Time Calculation:         Time Calculation- OT       Row Name 01/29/25 0913             Time Calculation- OT    OT Start Time 0823 -KF      OT Received On 01/29/25  -       OT Goal Re-Cert Due Date 02/05/25  -KF         Timed Charges    41050 - OT Therapeutic Activity Minutes 5  -KF      77896 - OT Self Care/Mgmt Minutes 10  -KF         Total Minutes    Timed Charges Total Minutes 15  -KF       Total Minutes 15  -KF                User Key  (r) = Recorded By, (t) = Taken By, (c) = Cosigned By      Initials Name Provider Type    Michell Minaya OT Occupational Therapist                  Therapy Charges for Today       Code Description Service Date Service Provider Modifiers Qty    92132716906 HC OT SELF CARE/MGMT/TRAIN EA 15 MIN 1/29/2025 Michell Richter OT GO 1                 Michell Richter OT  1/29/2025

## 2025-01-29 NOTE — NURSING NOTE
Patient in contact for covid. She has are to coccyx that appears calcified. Room air Has pacemaker. Mepilex in place to buttucks. Will continue to monitor

## 2025-01-29 NOTE — THERAPY TREATMENT NOTE
Patient Name: Laureen Nettles  : 1932    MRN: [id number]                              Today's Date: 2025       Admit Date: 2025    Visit Dx:     ICD-10-CM ICD-9-CM   1. Urinary tract infection without hematuria, site unspecified  N39.0 599.0   2. Generalized weakness  R53.1 780.79   3. Renal insufficiency  N28.9 593.9   4. Dehydration  E86.0 276.51   5. Hypercalcemia  E83.52 275.42   6. COVID-19 virus infection  U07.1 079.89     Patient Active Problem List   Diagnosis    Hyperlipidemia    Hypothyroidism    Cardiac pacemaker in situ    Aortic valve regurgitation    HTN (hypertension)    Osteoporosis    Acquired cystic kidney disease    Acute systolic CHF (congestive heart failure)    UTI (urinary tract infection)    Dehydration     Past Medical History:   Diagnosis Date    Aortic valve regurgitation     B12 deficiency     COVID-19 2022    Falls     Heart failure     HLD (hyperlipidemia)     HTN (hypertension)     Hypothyroidism     Pacemaker      Past Surgical History:   Procedure Laterality Date    ANAL FISTULA REPAIR      TONSILLECTOMY        General Information       Row Name 25 1035          Physical Therapy Time and Intention    Document Type therapy note (daily note)  -ND     Mode of Treatment physical therapy  -ND       Row Name 25 1035          General Information    Patient Profile Reviewed yes  -ND     Existing Precautions/Restrictions fall;other (see comments)  Severely [place]. Brief with mobility.  -ND     Barriers to Rehab previous functional deficit;hearing deficit  -ND       Row Name 25 1035          Cognition    Orientation Status (Cognition) oriented to;person;place;disoriented to;time;other (see comments)  -ND       Row Name 25 1035          Safety Issues/Impairments Affecting Functional Mobility    Safety Issues Affecting Function (Mobility) awareness of need for assistance;insight into deficits/self-awareness;positioning of assistive device;safety  precautions follow-through/compliance;sequencing abilities;safety precaution awareness  -ND     Impairments Affecting Function (Mobility) balance;endurance/activity tolerance;strength;postural/trunk control  -ND               User Key  (r) = Recorded By, (t) = Taken By, (c) = Cosigned By      Initials Name Provider Type    Josephine Tan PT Physical Therapist                   Mobility       Row Name 01/29/25 1037          Bed Mobility    Comment, (Bed Mobility) Pt sitting [place]IC with OT upon PT arrival.  -ND       Row Name 01/29/25 1037          Sit-Stand Transfer    Sit-Stand [place] (Transfers) contact guard;verbal cues;nonverbal cues (demo/gesture)  -ND     Assistive Device (Sit-Stand Transfers) walker, front-wheeled  -ND     Comment, (Sit-Stand Transfer) from chair, cues for hand placement.  -ND       Row Name 01/29/25 1037          Gait/Stairs (Locomotion)    [place] Level (Gait) minimum assist (75% patient effort);1 person assist;verbal cues;nonverbal cues (demo/gesture)  -ND     Assistive Device (Gait) walker, front-wheeled  -ND     Patient was able to Ambulate yes  -ND     Distance in Feet (Gait) 30  -ND     Deviations/Abnormal Patterns (Gait) bilateral deviations;carol decreased;base of support, wide;gait speed decreased;stride length decreased  -ND     Bilateral Gait Deviations forward flexed posture;heel strike decreased  -ND     Comment, (Gait/Stairs) Pt ambulates short distance with narrow REFUGIO, forward flexed kyphotic posture, and decreased stride length. Cues for upright posture and pt requires manual assist to advance RWx this date. Overall distance limited by fatigue.  -ND               User Key  (r) = Recorded By, (t) = Taken By, (c) = Cosigned By      Initials Name Provider Type    Josephine Tan PT Physical Therapist                   Obj/Interventions       Row Name 01/29/25 1039          Balance    Balance Assessment sitting static balance;sitting dynamic  balance;standing static balance;standing dynamic balance  -ND     Static Sitting Balance standby assist  -ND     Dynamic Sitting Balance standby assist  -ND     Position, Sitting Balance unsupported;sitting in chair  -ND     Static Standing Balance contact guard  -ND     Dynamic Standing Balance minimal assist  -ND     Position/Device Used, Standing Balance supported;walker, front-wheeled  -ND     Balance Interventions sitting;standing;sit to stand;supported;static;dynamic  -ND     Comment, Balance No over LOB or knee buckling noted.  -ND               User Key  (r) = Recorded By, (t) = Taken By, (c) = Cosigned By      Initials Name Provider Type    ND Josephine Barnhart, FELIZ Physical Therapist                   Goals/Plan    No documentation.                  Clinical Impression       Row Name 01/29/25 1040          Pain    Pretreatment Pain Rating 0/10 - no pain  -ND     Posttreatment Pain Rating 0/10 - no pain  -ND       Row Name 01/29/25 1040          Plan of Care Review    Plan of Care Reviewed With patient  -ND     Progress improving  -ND     Outcome Evaluation Pt demonstrated improvement in performance of functional mobility this date with min-A for ambulating short distance with RWx. Pt would benefit from continued IP PT services to further address strength, balance, and activity tolerance deficits to facilitate increased functional strength and mobility. Recommend SNF following d/c.  -ND       Row Name 01/29/25 1040          Vital Signs    O2 Delivery Pre Treatment room air  -ND     O2 Delivery Intra Treatment room air  -ND     O2 Delivery Post Treatment room air  -ND     Pre Patient Position Sitting  -ND     Intra Patient Position Standing  -ND     Post Patient Position Sitting  -ND       Row Name 01/29/25 1040          Positioning and Restraints    Pre-Treatment Position sitting in chair/recliner  -ND     Post Treatment Position chair  -ND     In Chair notified nsg;reclined;legs elevated;call light within  reach;encouraged to call for assist;exit alarm on;waffle cushion;on mechanical lift sling  -ND               User Key  (r) = Recorded By, (t) = Taken By, (c) = Cosigned By      Initials Name Provider Type    Josephine Tan, FELIZ Physical Therapist                   Outcome Measures       Row Name 01/29/25 1041          How much help from another person do you currently need...    Turning from your back to your side while in flat bed without using bedrails? 3  -ND     Moving from lying on back to sitting on the side of a flat bed without bedrails? 2  -ND     Moving to and from a bed to a chair (including a wheelchair)? 3  -ND     Standing up from a chair using your arms (e.g., wheelchair, bedside chair)? 3  -ND     Climbing 3-5 steps with a railing? 2  -ND     To walk in hospital room? 3  -ND     AM-PAC 6 Clicks Score (PT) 16  -ND     Highest Level of Mobility Goal 5 --> Static standing  -ND       Row Name 01/29/25 1041 01/29/25 0913       Functional Assessment    Outcome Measure Options AM-PAC 6 Clicks Basic Mobility (PT)  -ND AM-PAC 6 Clicks Daily Activity (OT)  -KF              User Key  (r) = Recorded By, (t) = Taken By, (c) = Cosigned By      Initials Name Provider Type    Michell Minaya, OT Occupational Therapist    Josephine Tan, FELIZ Physical Therapist                                 Physical Therapy Education       Title: PT OT SLP Therapies (In Progress)       Topic: Physical Therapy (In Progress)       Point: Mobility training (In Progress)       Learning Progress Summary            Patient Acceptance, E, NR by ND at 1/29/2025 1042    Acceptance, E, NR,NL by ZHANNA at 1/26/2025 1344                      Point: Home exercise program (Not Started)       Learner Progress:  Not documented in this visit.              Point: Body mechanics (In Progress)       Learning Progress Summary            Patient Acceptance, E, NR by ND at 1/29/2025 1042    Acceptance, E, NR,NL by ZHANNA at 1/26/2025 1344                       Point: Precautions (In Progress)       Learning Progress Summary            Patient Acceptance, E, NR by ND at 1/29/2025 1042    Acceptance, E, NR,NL by ZHANNA at 1/26/2025 1344                                      User Key       Initials Effective Dates Name Provider Type Discipline    ZHANNA 06/16/21 -  Paige Villalobos, PT Physical Therapist PT    ND 11/16/23 -  Josephine Barnhart PT Physical Therapist PT                  PT Recommendation and Plan     Progress: improving  Outcome Evaluation: Pt demonstrated improvement in performance of functional mobility this date with min-A for ambulating short distance with RWx. Pt would benefit from continued IP PT services to further address strength, balance, and activity tolerance deficits to facilitate increased functional strength and mobility. Recommend SNF following d/c.     Time Calculation:         PT Charges       Row Name 01/29/25 1042             Time Calculation    Start Time 0827  -ND      PT Received On 01/29/25  -ND         Timed Charges    77664 - PT Therapeutic Activity Minutes 14  -ND         Total Minutes    Timed Charges Total Minutes 14  -ND       Total Minutes 14  -ND                User Key  (r) = Recorded By, (t) = Taken By, (c) = Cosigned By      Initials Name Provider Type    Josephine Tan, PT Physical Therapist                  Therapy Charges for Today       Code Description Service Date Service Provider Modifiers Qty    92491536507 HC PT THERAPEUTIC ACT EA 15 MIN 1/29/2025 Josephine Barnhart, PT GP 1            PT G-Codes  Outcome Measure Options: AM-PAC 6 Clicks Basic Mobility (PT)  AM-PAC 6 Clicks Score (PT): 16  AM-PAC 6 Clicks Score (OT): 16  PT Discharge Summary  Anticipated Discharge Disposition (PT): skilled nursing facility    Josephine Barnhart PT  1/29/2025

## 2025-01-29 NOTE — PLAN OF CARE
Goal Outcome Evaluation:  Plan of Care Reviewed With: patient        Progress: improving  Outcome Evaluation: Pt with good participation and progress toward goals during OT session. Pt ambulated to/from the bathroom using a RWx with Cuco x1. Pt performed toileting with SBA and standing sink side grooming ADLs with CGA. Pt is quick to fatigue during activity however. The pt remains below baseline with generalized weakness, decreased activity tolerance, and balance deficits warranting continued IP OT services. Continue to rec a d/c to SNF.    Anticipated Discharge Disposition (OT): skilled nursing facility

## 2025-01-29 NOTE — CASE MANAGEMENT/SOCIAL WORK
Continued Stay Note  [facility]     Patient Name: Laureen Nettles  MRN: [id number]  Today's Date: 1/29/2025    Admit Date: 1/24/2025    Plan: Rehab via EMS.   Discharge Plan       Row Name 01/29/25 1256       Plan    Plan Rehab via EMS.    Plan Comments 'er met with patient at bedside. 'er spoke with Arden discuss the bed offer at [facility] for a Semi-private room for Saturday; Arden was agreeable. 'er called [facility] rep to start pre-cert, left a message awaiting return call. [facility] who is reviewing referral for rehab. Patient worked with PT/OT. SW faxed additional referrals. Plan is rehab via EMS.    @2 [facility] bed.    @4:40 [facility] to [facility].  [facility] with Ayesha [phone] with Ladi explains she will follow up 1/30 and Manuela Eduardo [phone] ext. [phone] reviewing. [facility] JOHN is Arden has no preference with rehab facilities.  Plan is rehab via EMS                   Discharge Codes    No documentation.                 Expected Discharge Date and Time       Expected Discharge Date Expected Discharge Time    Jan 30, 2025               MARIE Sherman (Kay)

## 2025-01-29 NOTE — PLAN OF CARE
Goal Outcome Evaluation:  Plan of Care Reviewed With: patient        Progress: no change  Outcome Evaluation: pt resting well, no c/o, turned per turn team, VSS, care continued

## 2025-01-29 NOTE — PROGRESS NOTES
"          Clinical Nutrition Assessment     Patient Name: Laureen Nettles  YOB: 1932  MRN: [id number]  Date of Encounter: 01/29/25 11:03 EST  Admission date: 1/24/2025  Reason for Visit: BMI    Assessment   Nutrition Assessment   Admission Diagnosis:  UTI (urinary tract infection) [N39.0]  Dehydration [E86.0]    Problem List:    UTI (urinary tract infection)    Dehydration      PMH:   She  has a past medical history of Aortic valve regurgitation, B12 deficiency, COVID-19 (02/2022), Falls, Heart failure, HLD (hyperlipidemia), HTN (hypertension), Hypothyroidism, and Pacemaker.    PSH:  She  has a past surgical history that includes Tonsillectomy and Anal Fistula Repair.    Applicable Nutrition History:     Anthropometrics     Height: Height: 152.4 cm (60\")  Last Filed Weight: Weight: 39 kg (86 lb) (01/24/25 1149)  Method: Weight Method: Estimated  BMI: BMI (Calculated): 16.8    UBW:     Weight       Weight (kg) Weight (lbs) Weight Method Visit Report   5/23/2022 39.917 kg  88 lb  Stated     10/31/2023 42.366 kg  93 lb 6.4 oz   --    1/21/2025 39.009 kg  86 lb   --    1/24/2025 39.009 kg  86 lb  Estimated         Weight change: No significant changes per limited data    Nutrition Focused Physical Exam    Date:  1/29       Unable to perform due to COVID Isolation      Subjective   Reported/Observed/Food/Nutrition Related History:     Patient in COVID isolation and I have been unable to reach her by phone, pt [facility]. Discussed with RN, she is eating okay and enjoys the boost. Was lethargic on POA but has improved. Plan for rehab soon. Appears to have been underweight for many years per EMR.     Current Nutrition Prescription   PO: Diet: Regular/House; Fluid Consistency: Thin (IDDSI 0)  Oral Nutrition Supplement:  Intake: Insufficient data 63% X 12 meals documented    Assessment & Plan   Nutrition Diagnosis   Date:  1/29            Updated:    Problem Underweight    Etiology For age/height   Signs/Symptoms BMI " 16.8 kg/m2   Status: New    Goal:   Nutrition to support treatment and Increase intake    Nutrition Intervention      Follow treatment progress, Care plan reviewed, Advise alternate selection, Advised available snacks, Interview for preferences, Menu provided, Encourage intake, Supplement provided    Pt eating okay slightly >50% meals, sending boost all meals.     Monitoring/Evaluation:   Per protocol, PO intake, Supplement intake, Weight, GI status, Symptoms, POC/GOC    Celeste Headley RD  Time Spent: 20m

## 2025-01-29 NOTE — PLAN OF CARE
Goal Outcome Evaluation:  Plan of Care Reviewed With: patient        Progress: improving  Outcome Evaluation: Pt demonstrated improvement in performance of functional mobility this date with min-A for ambulating short distance with RWx. Pt would benefit from continued IP PT services to further address strength, balance, and activity tolerance deficits to facilitate increased functional strength and mobility. Recommend SNF following d/c.    Anticipated Discharge Disposition (PT): skilled nursing facility

## 2025-01-29 NOTE — PLAN OF CARE
Goal Outcome Evaluation:  Plan of Care Reviewed With: patient        Progress: improving  Outcome Evaluation: VSS on RA. Hard of hearing. Oriented to self only. Forgetful. Good PO intake. Adequate UOP. No BM this shift. Up in chair at start of shift. Back to bed with lift device. Purewick in place. Turns conducted. No c/o. Awaiting rehab.

## 2025-01-29 NOTE — DISCHARGE PLACEMENT REQUEST
"Rosa Osuna (89 y.o. Female)      BH Contact Crissy العراقي  [phone]     Date of Birth   1932    Social Security Number       Address   [address] [place], [place] [address]    Home Phone   [phone]    MRN   [id number]       [facility]    Marital Status   Single                            Admission Date   25    Admission Type   Emergency    Admitting Provider   JOSUE Benson DO    Attending Provider   JOSUE Benson DO    Department, Room/Bed   [facility] 5B, N532/1       Discharge Date       Discharge Disposition       Discharge Destination                                 Attending Provider: JOSUE Benson DO    Allergies: Penicillins, Potassium-containing Compounds    Isolation: Contact Air   Infection: COVID (confirmed) (25)   Code Status: CPR    Ht: 152.4 cm (60\")   Wt: 39 kg (86 lb)    Admission Cmt: None   Principal Problem: UTI (urinary tract infection) [N39.0]                   Active Insurance as of 2025       Primary Coverage       Payor Plan Insurance Group Employer/Plan Group    HUMANA MEDICARE REPLACEMENT HUMANA MED ADV HMO [id number]       Payor Plan Address Payor Plan Phone Number Payor Plan Fax Number Effective Dates    [address] [address] [phone]  2025 - None Entered    [facility] [id number]         Subscriber Name Subscriber Birth Date Member ID       ROSA OSUNA 1932 [id number]                     Emergency Contacts        (Rel.) Home Phone Work Phone Mobile Phone    Arden Marie (Other) -- -- [phone]    Shara Marie (Relative) -- -- [phone]                 Physician Progress Notes (most recent note)        JOSUE Benson DO at 25 [address] [place] [address] Medicine Services  PROGRESS NOTE    Patient Name: Rosa Osuna  : 1932  MRN: [id number]    Date of Admission: 2025  Primary Care Physician: Criselda Padgett MD    Subjective "   Subjective     CC:  altered mental status     HPI: Patient is a 89-year-old female seen and examined by me this a.m., she is pending hopeful rehab placement at this time.  She is currently in COVID-19 isolation, no acute complaints during my exam      Objective   Objective     Vital Signs:   Temp:  [97.1 °F (36.2 °C)-98.4 °F (36.9 °C)] 98.3 °F (36.8 °C)  Heart Rate:  [80-91] 91  Resp:  [16-20] 18  BP: (113-152)/(46-57) 130/57     Physical Exam:  Constitutional: No acute distress, awake, alert, frail and elderly appearing, resting in bed, on RA    HENT: NCAT, mucous membranes moist  Respiratory: Decreased BS bilaterally, respiratory effort normal   Cardiovascular: RRR  Gastrointestinal: soft, nontender, nondistended  Musculoskeletal: No bilateral ankle edema  Neurologic: Limited, no obvious focal deficits, FOLEY   Skin: No rashes        Results Reviewed:  LAB RESULTS:      Lab 01/28/25  0904 01/25/25  1349 01/24/25  1310 01/24/25  1153   WBC 11.30* 12.98*  --  10.76   HEMOGLOBIN 13.4 12.1  --  15.1   HEMATOCRIT 42.5 37.6  --  46.0   PLATELETS 184 215  --  246   NEUTROS ABS 8.44* 10.60*  --  8.45*   IMMATURE GRANS (ABS) 0.13* 0.06*  --  0.05   LYMPHS ABS 1.72 1.42  --  1.45   MONOS ABS 0.76 0.83  --  0.72   EOS ABS 0.17 0.03  --  0.03   MCV 97.7* 94.9  --  94.3   PROCALCITONIN  --   --  0.24  --    HSTROP T  --   --  35* 35*         Lab 01/28/25  0855 01/26/25  0301 01/25/25  1349 01/24/25  1153   SODIUM 135* 141 141 139   POTASSIUM 4.2 3.5 3.9 4.8   CHLORIDE 107 106 104 94*   CO2 18.0* 26.0 31.0* 25.0   ANION GAP 10.0 9.0 6.0 20.0*   BUN 24* 45* 48* 60*   CREATININE 0.74 1.02* 1.56* 1.39*   EGFR 76.0 51.7* 31.1* 35.7*   GLUCOSE 94 110* 138* 106*   CALCIUM 8.8 9.5 11.0* 13.2*   IONIZED CALCIUM  --   --  1.37*  --    MAGNESIUM 2.1  --  1.9 2.4*   PHOSPHORUS  --  4.2 1.5*  --    TSH  --   --  3.570  --          Lab 01/28/25  0855 01/24/25  1153   TOTAL PROTEIN 5.6* 7.5   ALBUMIN 2.6* 4.2   GLOBULIN 3.0 3.3   ALT (SGPT)  5 9   AST (SGOT) 19 17   BILIRUBIN 0.3 0.4   ALK PHOS 51 71         Lab 01/24/25  1310 01/24/25  1153   HSTROP T 35* 35*                 Brief Urine Lab Results  (Last result in the past 365 days)        Color   Clarity   Blood   Leuk Est   Nitrite   Protein   CREAT   Urine HCG        01/24/25 1222 Yellow   Turbid   Moderate (2+)   Large (3+)   Negative   30 mg/dL (1+)                   Microbiology Results Abnormal       Procedure Component Value - Date/Time    COVID PRE-OP / PRE-PROCEDURE SCREENING ORDER (NO ISOLATION) - Swab, Nasopharynx [[id number]]  (Abnormal) Collected: 01/24/25 1201    Lab Status: Final result Specimen: Swab from Nasopharynx Updated: 01/24/25 1256    Narrative:      The following orders were created for panel order COVID PRE-OP / PRE-PROCEDURE SCREENING ORDER (NO ISOLATION) - Swab, Nasopharynx.  Procedure                               Abnormality         Status                     ---------                               -----------         ------                     COVID-19 and FLU A/B PCR...[[id number]]  Abnormal            Final result                 Please view results for these tests on the individual orders.    COVID-19 and FLU A/B PCR, 1 HR TAT - Swab, Nasopharynx [[id number]]  (Abnormal) Collected: 01/24/25 1201    Lab Status: Final result Specimen: Swab from Nasopharynx Updated: 01/24/25 1256     COVID19 Detected     Influenza A PCR Not Detected     Influenza B PCR Not Detected    Narrative:      Fact sheet for providers: https://www.fda.gov/media/668513/download    Fact sheet for patients: https://www.fda.gov/media/174277/download    Test performed by PCR.  Influenza A and Influenza B negative results should be considered presumptive in samples that have a positive SARS-CoV-2 result.    Competitive inhibition studies showed that SARS-CoV-2 virus, when present at concentrations above 3.6E+04 copies/mL, can inhibit the detection and amplification of influenza A and influenza B virus RNA  if present at or below 1.8E+02 copies/mL or 4.9E+02 copies/mL, respectively, and may lead to false negative influenza virus results. If co-infection with influenza A or influenza B virus is suspected in samples with a positive SARS-CoV-2 result, the sample should be re-tested with another FDA cleared, approved, or authorized influenza test, if influenza virus detection would change clinical management.            No radiology results from the last 24 hrs    Results for orders placed during the hospital encounter of 08/29/21    Adult Transthoracic Echo Complete W/ Cont if Necessary Per Protocol    Interpretation Summary  · Estimated left ventricular EF = 30-35%. Moderate to severe LV systolic dysfunction.  · Left ventricular wall thickness is consistent with mild concentric hypertrophy.  · Left ventricular diastolic function is consistent with (grade II w/high LAP) pseudonormalization.  · Severe aortic valve regurgitation is present.  · Moderate to severe mitral valve regurgitation is present.  · Mild tricuspid valve regurgitation is present  · Estimated right ventricular systolic pressure from tricuspid regurgitation is normal (<35 mmHg).      Current medications:  Scheduled Meds:apixaban, 2.5 mg, Oral, BID  [Held by provider] furosemide, 20 mg, Oral, Daily  levothyroxine, 88 mcg, Oral, Q AM  metoprolol succinate XL, 25 mg, Oral, Daily  sodium chloride, 10 mL, Intravenous, Q12H  [Held by provider] spironolactone, 25 mg, Oral, Daily      Continuous Infusions:     PRN Meds:.  acetaminophen    senna-docusate sodium **AND** polyethylene glycol **AND** bisacodyl **AND** bisacodyl    Calcium Replacement - Follow Nurse / BPA Driven Protocol    Magnesium Standard Dose Replacement - Follow Nurse / BPA Driven Protocol    ondansetron    Phosphorus Replacement - Follow Nurse / BPA Driven Protocol    Polyvinyl Alcohol-Povidone PF    Potassium Replacement - Follow Nurse / BPA Driven Protocol    sodium chloride    sodium  chloride    sodium chloride    Assessment & Plan   Assessment & Plan     Active Hospital Problems    Diagnosis  POA    **UTI (urinary tract infection) [N39.0]  Yes    Dehydration [E86.0]  Yes      Resolved Hospital Problems   No resolved problems to display.        Brief Hospital Course to date:  Laureen Nettles is a 89 y.o. female with history of hypothyroidism and paroxysmal A-fib who lives at [facility] and sent for lethargy, mental status changes.  Labs significant for hypercalcemia and mild MEENAKSHI.  Patient seen and examined by me on the AM of 1/28, patient easily awoken, will answer some questions, pending possible rehab placement at this time.     Lethargy, improved   -Likely from hypercalcemia and dehydration and COVID  - Calcium supplements stopped.  Hydrated.  Now eating.     - recent COVID, ? L basilar infiltrate.  Cefazolin will cover most organisms and the infiltrate is likely from COVID not bacterial, or is noninfectious.   - Pt was on diuretics until admission - would stop or make them PRN at DC.      COVID +  Possible L base infiltrate vs atelectasis  UA + but contaminated; Ucx no growth   - COVID diagnosed on 1/21/25  - denies symptoms.  On room air.      MEENAKSHI  - baseline Cr 0.9, but on admission was 1.4; now 1.0     Dual chamber PPM   PAFib   History of CHF, EF 30% in 2021  - was started on lasix/spironolactone for this in 2021  - hold diuretics and perhaps stop permanently  - metoprolol     Dispo:  comes from [facility] assisted living; too weak to return currently; planning for SNF.      Expected Discharge Location and Transportation: SNF  Expected Discharge   Expected Discharge Date: 1/30/2025; Expected Discharge Time:      VTE Prophylaxis:  Pharmacologic & mechanical VTE prophylaxis orders are present.         AM-PAC 6 Clicks Score (PT): 13 (01/28/25 5920)    CODE STATUS:   Code Status and Medical Interventions: CPR (Attempt to Resuscitate); Full Support   Ordered at: 01/24/25 1345     Level Of Support  Discussed With:    Patient     Code Status (Patient has no pulse and is not breathing):    CPR (Attempt to Resuscitate)     Medical Interventions (Patient has pulse or is breathing):    Full Support       MARIA Benson DO  25        Electronically signed by JOSUE Benson DO at 25 1509          Physical Therapy Notes (most recent note)        Josephine Barnhart, PT at 25 0827  Version 1 of 1         Patient Name: Laureen Nettles  : 1932    MRN: [id number]                              Today's Date: 2025       Admit Date: 2025    Visit Dx:     ICD-10-CM ICD-9-CM   1. Urinary tract infection without hematuria, site unspecified  N39.0 599.0   2. Generalized weakness  R53.1 780.79   3. Renal insufficiency  N28.9 593.9   4. Dehydration  E86.0 276.51   5. Hypercalcemia  E83.52 275.42   6. COVID-19 virus infection  U07.1 079.89     Patient Active Problem List   Diagnosis    Hyperlipidemia    Hypothyroidism    Cardiac pacemaker in situ    Aortic valve regurgitation    HTN (hypertension)    Osteoporosis    Acquired cystic kidney disease    Acute systolic CHF (congestive heart failure)    UTI (urinary tract infection)    Dehydration     Past Medical History:   Diagnosis Date    Aortic valve regurgitation     B12 deficiency     COVID-19 2022    Falls     Heart failure     HLD (hyperlipidemia)     HTN (hypertension)     Hypothyroidism     Pacemaker      Past Surgical History:   Procedure Laterality Date    ANAL FISTULA REPAIR      TONSILLECTOMY        General Information       Row Name 25 1035          Physical Therapy Time and Intention    Document Type therapy note (daily note)  -ND     Mode of Treatment physical therapy  -ND       Row Name 25 1035          General Information    Patient Profile Reviewed yes  -ND     Existing Precautions/Restrictions fall;other (see comments)  Severely [place]. Brief with mobility.  -ND     Barriers to Rehab previous functional deficit;hearing  deficit  -ND       Row Name 01/29/25 1035          Cognition    Orientation Status (Cognition) oriented to;person;place;disoriented to;time;other (see comments)  -ND       Row Name 01/29/25 1035          Safety Issues/Impairments Affecting Functional Mobility    Safety Issues Affecting Function (Mobility) awareness of need for assistance;insight into deficits/self-awareness;positioning of assistive device;safety precautions follow-through/compliance;sequencing abilities;safety precaution awareness  -ND     Impairments Affecting Function (Mobility) balance;endurance/activity tolerance;strength;postural/trunk control  -ND               User Key  (r) = Recorded By, (t) = Taken By, (c) = Cosigned By      Initials Name Provider Type    ND Josephine Barnhart, PT Physical Therapist                   Mobility       Row Name 01/29/25 1037          Bed Mobility    Comment, (Bed Mobility) Pt sitting UIC with OT upon PT arrival.  -ND       Row Name 01/29/25 1037          Sit-Stand Transfer    Sit-Stand [place] (Transfers) contact guard;verbal cues;nonverbal cues (demo/gesture)  -ND     Assistive Device (Sit-Stand Transfers) walker, front-wheeled  -ND     Comment, (Sit-Stand Transfer) from chair, cues for hand placement.  -ND       Row Name 01/29/25 1037          Gait/Stairs (Locomotion)    [place] Level (Gait) minimum assist (75% patient effort);1 person assist;verbal cues;nonverbal cues (demo/gesture)  -ND     Assistive Device (Gait) walker, front-wheeled  -ND     Patient was able to Ambulate yes  -ND     Distance in Feet (Gait) 30  -ND     Deviations/Abnormal Patterns (Gait) bilateral deviations;carol decreased;base of support, wide;gait speed decreased;stride length decreased  -ND     Bilateral Gait Deviations forward flexed posture;heel strike decreased  -ND     Comment, (Gait/Stairs) Pt ambulates short distance with narrow REFUGIO, forward flexed kyphotic posture, and decreased stride length. Cues for upright  posture and pt requires manual assist to advance RWx this date. Overall distance limited by fatigue.  -ND               User Key  (r) = Recorded By, (t) = Taken By, (c) = Cosigned By      Initials Name Provider Type    Josephine Tan PT Physical Therapist                   Obj/Interventions       Row Name 01/29/25 1039          Balance    Balance Assessment sitting static balance;sitting dynamic balance;standing static balance;standing dynamic balance  -ND     Static Sitting Balance standby assist  -ND     Dynamic Sitting Balance standby assist  -ND     Position, Sitting Balance unsupported;sitting in chair  -ND     Static Standing Balance contact guard  -ND     Dynamic Standing Balance minimal assist  -ND     Position/Device Used, Standing Balance supported;walker, front-wheeled  -ND     Balance Interventions sitting;standing;sit to stand;supported;static;dynamic  -ND     Comment, Balance No over LOB or knee buckling noted.  -ND               User Key  (r) = Recorded By, (t) = Taken By, (c) = Cosigned By      Initials Name Provider Type    Josephine Tan PT Physical Therapist                   Goals/Plan    No documentation.                  Clinical Impression       Row Name 01/29/25 1040          Pain    Pretreatment Pain Rating 0/10 - no pain  -ND     Posttreatment Pain Rating 0/10 - no pain  -ND       Row Name 01/29/25 1040          Plan of Care Review    Plan of Care Reviewed With patient  -ND     Progress improving  -ND     Outcome Evaluation Pt demonstrated improvement in performance of functional mobility this date with min-A for ambulating short distance with RWx. Pt would benefit from continued IP PT services to further address strength, balance, and activity tolerance deficits to facilitate increased functional strength and mobility. Recommend SNF following d/c.  -ND       Row Name 01/29/25 1040          Vital Signs    O2 Delivery Pre Treatment room air  -ND     O2 Delivery Intra  Treatment room air  -ND     O2 Delivery Post Treatment room air  -ND     Pre Patient Position Sitting  -ND     Intra Patient Position Standing  -ND     Post Patient Position Sitting  -ND       Row Name 01/29/25 1040          Positioning and Restraints    Pre-Treatment Position sitting in chair/recliner  -ND     Post Treatment Position chair  -ND     In Chair notified nsg;reclined;legs elevated;call light within reach;encouraged to call for assist;exit alarm on;waffle cushion;on mechanical lift sling  -ND               User Key  (r) = Recorded By, (t) = Taken By, (c) = Cosigned By      Initials Name Provider Type    Josephine Tan, PT Physical Therapist                   Outcome Measures       Row Name 01/29/25 1041          How much help from another person do you currently need...    Turning from your back to your side while in flat bed without using bedrails? 3  -ND     Moving from lying on back to sitting on the side of a flat bed without bedrails? 2  -ND     Moving to and from a bed to a chair (including a wheelchair)? 3  -ND     Standing up from a chair using your arms (e.g., wheelchair, bedside chair)? 3  -ND     Climbing 3-5 steps with a railing? 2  -ND     To walk in hospital room? 3  -ND     AM-PAC 6 Clicks Score (PT) 16  -ND     Highest Level of Mobility Goal 5 --> Static standing  -ND       Row Name 01/29/25 1041 01/29/25 0913       Functional Assessment    Outcome Measure Options AM-PAC 6 Clicks Basic Mobility (PT)  -ND AM-PAC 6 Clicks Daily Activity (OT)  -KF              User Key  (r) = Recorded By, (t) = Taken By, (c) = Cosigned By      Initials Name Provider Type    Michell Minaya OT Occupational Therapist    Josephine Tan, PT Physical Therapist                                 Physical Therapy Education       Title: PT OT SLP Therapies (In Progress)       Topic: Physical Therapy (In Progress)       Point: Mobility training (In Progress)       Learning Progress Summary             Patient Acceptance, E, NR by ND at 1/29/2025 1042    Acceptance, E, NR,NL by ZHANNA at 1/26/2025 1344                      Point: Home exercise program (Not Started)       Learner Progress:  Not documented in this visit.              Point: Body mechanics (In Progress)       Learning Progress Summary            Patient Acceptance, E, NR by ND at 1/29/2025 1042    Acceptance, E, NR,NL by ZHANNA at 1/26/2025 1344                      Point: Precautions (In Progress)       Learning Progress Summary            Patient Acceptance, E, NR by ND at 1/29/2025 1042    Acceptance, E, NR,NL by ZHANNA at 1/26/2025 1344                                      User Key       Initials Effective Dates Name Provider Type Discipline     06/16/21 -  Paige Villalobos, PT Physical Therapist PT    ND 11/16/23 -  Josephine Barnhart PT Physical Therapist PT                  PT Recommendation and Plan     Progress: improving  Outcome Evaluation: Pt demonstrated improvement in performance of functional mobility this date with min-A for ambulating short distance with RWx. Pt would benefit from continued IP PT services to further address strength, balance, and activity tolerance deficits to facilitate increased functional strength and mobility. Recommend SNF following d/c.     Time Calculation:         PT Charges       Row Name 01/29/25 1042             Time Calculation    Start Time 0827  -ND      PT Received On 01/29/25  -ND         Timed Charges    39249 - PT Therapeutic Activity Minutes 14  -ND         Total Minutes    Timed Charges Total Minutes 14  -ND       Total Minutes 14  -ND                User Key  (r) = Recorded By, (t) = Taken By, (c) = Cosigned By      Initials Name Provider Type    ND Josephine Barnhart PT Physical Therapist                  Therapy Charges for Today       Code Description Service Date Service Provider Modifiers Qty    [id number]  PT THERAPEUTIC ACT EA 15 MIN 1/29/2025 Josephine Barnhart, PT GP 1            PT  G-Codes  Outcome Measure Options: AM-PAC 6 Clicks Basic Mobility (PT)  AM-PAC 6 Clicks Score (PT): 16  AM-PAC 6 Clicks Score (OT): 16  PT Discharge Summary  Anticipated Discharge Disposition (PT): skilled nursing facility    Josephine Barnhart, PT  2025      Electronically signed by Josephine Barnhart, PT at 25 1043          Occupational Therapy Notes (most recent note)        Michell Richter, OT at 25 0823          Patient Name: Laureen Nettles  : 1932    MRN: [id number]                              Today's Date: 2025       Admit Date: 2025    Visit Dx:     ICD-10-CM ICD-9-CM   1. Urinary tract infection without hematuria, site unspecified  N39.0 599.0   2. Generalized weakness  R53.1 780.79   3. Renal insufficiency  N28.9 593.9   4. Dehydration  E86.0 276.51   5. Hypercalcemia  E83.52 275.42   6. COVID-19 virus infection  U07.1 079.89     Patient Active Problem List   Diagnosis    Hyperlipidemia    Hypothyroidism    Cardiac pacemaker in situ    Aortic valve regurgitation    HTN (hypertension)    Osteoporosis    Acquired cystic kidney disease    Acute systolic CHF (congestive heart failure)    UTI (urinary tract infection)    Dehydration     Past Medical History:   Diagnosis Date    Aortic valve regurgitation     B12 deficiency     COVID-19 2022    Falls     Heart failure     HLD (hyperlipidemia)     HTN (hypertension)     Hypothyroidism     Pacemaker      Past Surgical History:   Procedure Laterality Date    ANAL FISTULA REPAIR      TONSILLECTOMY        General Information       Row Name 25 0908          OT Time and Intention    Document Type therapy note (daily note)  -KF     Mode of Treatment occupational therapy  -KF       Row Name 25 0908          General Information    Patient Profile Reviewed yes  -KF     Existing Precautions/Restrictions fall;other (see comments)  very [place], brief with mobility  -KF     Barriers to Rehab medically complex;previous functional  deficit;hearing deficit  -       Row Name 01/29/25 0908          Cognition    Orientation Status (Cognition) oriented to;person;place;disoriented to;time;other (see comments)  Pt stated Feb 2023.  -       Row Name 01/29/25 0908          Safety Issues/Impairments Affecting Functional Mobility    Safety Issues Affecting Function (Mobility) awareness of need for assistance;insight into deficits/self-awareness;positioning of assistive device;safety precaution awareness;safety precautions follow-through/compliance;sequencing abilities  -     Impairments Affecting Function (Mobility) balance;endurance/activity tolerance;postural/trunk control;strength  -               User Key  (r) = Recorded By, (t) = Taken By, (c) = Cosigned By      Initials Name Provider Type    KF Michell Richter OT Occupational Therapist                     Mobility/ADL's       Row Name 01/29/25 0908          Bed Mobility    Bed Mobility supine-sit  -     Supine-Sit [place] (Bed Mobility) minimum assist (75% patient effort);1 person assist;verbal cues  -     Assistive Device (Bed Mobility) bed rails;head of bed elevated  -     Comment, (Bed Mobility) Increased time needed for pt to scoot hips toward the EOB  -       Row Name 01/29/25 0908          Transfers    Transfers sit-stand transfer;stand-sit transfer;toilet transfer  -     Comment, (Transfers) Cues provided for hand placement  -       Row Name 01/29/25 0908          Sit-Stand Transfer    Sit-Stand [place] (Transfers) minimum assist (75% patient effort);1 person assist;verbal cues  -     Assistive Device (Sit-Stand Transfers) walker, front-wheeled  -     Comment, (Sit-Stand Transfer) STS x1 from the EOB, x1 from commode  -       Row Name 01/29/25 0908          Stand-Sit Transfer    Stand-Sit [place] (Transfers) minimum assist (75% patient effort);1 person assist;verbal cues  -     Assistive Device (Stand-Sit Transfers) walker, front-wheeled  -        [facility] Name 01/29/25 0908          Toilet Transfer    Type (Toilet Transfer) sit-stand;stand-sit  -     [place] Level (Toilet Transfer) minimum assist (75% patient effort);1 person assist;verbal cues  -     Assistive Device (Toilet Transfer) walker, front-wheeled;commode;grab bars/safety frame  -[facility] Name 01/29/25 0908          Functional Mobility    Functional Mobility- Ind. Level minimum assist (75% patient effort);1 person;verbal cues required  -     Functional Mobility- Device walker, front-wheeled  -     Functional Mobility-Distance (Feet) --  to the bathroom  -     Patient was able to Ambulate yes  -[facility] Name 01/29/25 0908          Activities of Daily Living    BADL Assessment/Intervention lower body dressing;bathing;grooming;feeding;toileting  -[facility] Name 01/29/25 0908          Bathing Assessment/Intervention    [place] Level (Bathing) lower body;dependent (less than 25% patient effort)  -     Comment, (Bathing) Pt with episode of incontinence during ambulation to the bathroom. LB bathing performed dependently.  -[facility] Name 01/29/25 0908          Lower Body Dressing Assessment/Training    [place] Level (Lower Body Dressing) doff;don;socks;dependent (less than 25% patient effort)  -     Position (Lower Body Dressing) unsupported sitting  -[facility] Name 01/29/25 0908          Grooming Assessment/Training    [place] Level (Grooming) oral care regimen;hair care, combing/brushing;contact guard assist  -     Position (Grooming) sink side;supported standing  -[facility] Name 01/29/25 0908          Self-Feeding Assessment/Training    [place] Level (Feeding) prepare tray/open items;minimum assist (75% patient effort);liquids to mouth;scoop food and bring to mouth;independent  -     Position (Feeding) supported sitting  -[facility] Name 01/29/25 0908          Toileting Assessment/Training    [place] Level (Toileting) adjust/manage  clothing;perform perineal hygiene;standby assist  -KF     Position (Toileting) unsupported sitting  -KF               User Key  (r) = Recorded By, (t) = Taken By, (c) = Cosigned By      Initials Name Provider Type    Michell Minaya OT Occupational Therapist                   Obj/Interventions       Row Name 01/29/25 0910          Balance    Balance Assessment sitting static balance;sitting dynamic balance;sit to stand dynamic balance;standing static balance;standing dynamic balance  -KF     Static Sitting Balance standby assist  -KF     Dynamic Sitting Balance standby assist  -KF     Position, Sitting Balance unsupported;sitting edge of bed;other (see comments)  commode  -KF     Sit to Stand Dynamic Balance minimal assist;1-person assist;verbal cues  -KF     Static Standing Balance contact guard  -KF     Dynamic Standing Balance minimal assist;1-person assist  -KF     Position/Device Used, Standing Balance supported;walker, front-wheeled  -KF     Balance Interventions sitting;standing;sit to stand;supported;static;dynamic;occupation based/functional task  -KF               User Key  (r) = Recorded By, (t) = Taken By, (c) = Cosigned By      Initials Name Provider Type    Michell Minaya OT Occupational Therapist                   Goals/Plan    No documentation.                  Clinical Impression       Row Name 01/29/25 0911          Pain Assessment    Pretreatment Pain Rating 0/10 - no pain  -KF     Posttreatment Pain Rating 0/10 - no pain  -KF       Row Name 01/29/25 0911          Plan of Care Review    Plan of Care Reviewed With patient  -KF     Progress improving  -KF     Outcome Evaluation Pt with good participation and progress toward goals during OT session. Pt ambulated to/from the bathroom using a RWx with Cuco x1. Pt performed toileting with SBA and standing sink side grooming ADLs with CGA. Pt is quick to fatigue during activity however. The pt remains below baseline with generalized weakness,  decreased activity tolerance, and balance deficits warranting continued IP OT services. Continue to rec a d/c to SNF.  -       Row Name 01/29/25 0911          Therapy Assessment/Plan (OT)    Rehab Potential (OT) good  -KF     Criteria for Skilled Therapeutic Interventions Met (OT) yes;skilled treatment is necessary  -KF     Therapy Frequency (OT) daily  -KF       Row Name 01/29/25 0911          Therapy Plan Review/Discharge Plan (OT)    Anticipated Discharge Disposition (OT) [facility] nursing [facility]  -       Row Name 01/29/25 0911          Vital Signs    Pre Systolic BP Rehab --  non-tele, RN cleared  -KF     Pre Patient Position Supine  -KF     Intra Patient Position Standing  -KF     Post Patient Position Standing  -KF       Row Name 01/29/25 0911          Positioning and Restraints    Pre-Treatment Position in bed  -KF     Post Treatment Position bathroom  -KF     Bathroom with PT  -KF               User Key  (r) = Recorded By, (t) = Taken By, (c) = Cosigned By      Initials Name Provider Type    Michell Minaya OT Occupational Therapist                   Outcome Measures       Row Name 01/29/25 0913          How much help from another is currently needed...    Putting on and taking off regular lower body clothing? 2  -KF     Bathing (including washing, rinsing, and drying) 2  -KF     Toileting (which includes using toilet bed pan or urinal) 3  -KF     Putting on and taking off regular upper body clothing 3  -KF     Taking care of personal grooming (such as brushing teeth) 3  -KF     Eating meals 3  -KF     AM-PAC 6 Clicks Score (OT) 16  -KF       Row Name 01/29/25 0913          Functional Assessment    Outcome Measure Options AM-PAC 6 Clicks Daily Activity (OT)  -KF               User Key  (r) = Recorded By, (t) = Taken By, (c) = Cosigned By      Initials Name Provider Type    Michell Minaya OT Occupational Therapist                    Occupational Therapy Education       Title: PT OT SLP Therapies  (In Progress)       Topic: Occupational Therapy (In Progress)       Point: ADL training (Done)       Description:   Instruct learner(s) on proper safety adaptation and remediation techniques during self care or transfers.   Instruct in proper use of assistive devices.                  Learning Progress Summary            Patient Acceptance, E,TB, VU,DU by  at 1/29/2025 0823    Acceptance, E, NR by  at 1/26/2025 1054                      Point: Home exercise program (Not Started)       Description:   Instruct learner(s) on appropriate technique for monitoring, assisting and/or progressing therapeutic exercises/activities.                  Learner Progress:  Not documented in this visit.              Point: Precautions (Done)       Description:   Instruct learner(s) on prescribed precautions during self-care and functional transfers.                  Learning Progress Summary            Patient Acceptance, E,TB, VU,DU by  at 1/29/2025 0823    Acceptance, E, NR by  at 1/26/2025 1054                      Point: Body mechanics (Done)       Description:   Instruct learner(s) on proper positioning and spine alignment during self-care, functional mobility activities and/or exercises.                  Learning Progress Summary            Patient Acceptance, E,TB, VU,DU by  at 1/29/2025 0823    Acceptance, E, NR by  at 1/26/2025 1054                                      User Key       Initials Effective Dates Name Provider Type Discipline     06/16/21 -  Domonique Delarosa OT Occupational Therapist OT     08/09/23 -  Michell Richter OT Occupational Therapist OT                  OT Recommendation and Plan  Therapy Frequency (OT): daily  Plan of Care Review  Plan of Care Reviewed With: patient  Progress: improving  Outcome Evaluation: Pt with good participation and progress toward goals during OT session. Pt ambulated to/from the bathroom using a RWx with Cuco x1. Pt performed toileting with SBA and standing sink  side grooming ADLs with CGA. Pt is quick to fatigue during activity however. The pt remains below baseline with generalized weakness, decreased activity tolerance, and balance deficits warranting continued IP OT services. Continue to rec a d/c to SNF.     Time Calculation:         Time Calculation- OT       Row Name 01/29/25 0913             Time Calculation- OT    OT Start Time 0823  -KF      OT Received On 01/29/25  -KF      OT Goal Re-Cert Due Date 02/05/25  -KF         Timed Charges    17457 - OT Therapeutic Activity Minutes 5  -KF      38804 - OT Self Care/Mgmt Minutes 10  -KF         Total Minutes    Timed Charges Total Minutes 15  -KF       Total Minutes 15  -KF                User Key  (r) = Recorded By, (t) = Taken By, (c) = Cosigned By      Initials Name Provider Type    KF Michell Richter OT Occupational Therapist                  Therapy Charges for Today       Code Description Service Date Service Provider Modifiers Qty    [id number] HC OT SELF CARE/MGMT/TRAIN EA 15 MIN 1/29/2025 Michell Richter OT GO 1                 Michell Richter OT  1/29/2025    Electronically signed by Michell Richter OT at 01/29/25 0914

## 2025-01-30 LAB
ALBUMIN SERPL-MCNC: 3 G/DL (ref 3.5–5.2)
ALBUMIN/GLOB SERPL: 1.3 G/DL
ALP SERPL-CCNC: 57 U/L (ref 39–117)
ALT SERPL W P-5'-P-CCNC: 11 U/L (ref 1–33)
ANION GAP SERPL CALCULATED.3IONS-SCNC: 8 MMOL/L (ref 5–15)
AST SERPL-CCNC: 22 U/L (ref 1–32)
BASOPHILS # BLD AUTO: 0.08 10*3/MM3 (ref 0–0.2)
BASOPHILS NFR BLD AUTO: 0.7 % (ref 0–1.5)
BILIRUB SERPL-MCNC: 0.3 MG/DL (ref 0–1.2)
BUN SERPL-MCNC: 28 MG/DL (ref 8–23)
BUN/CREAT SERPL: 27.2 (ref 7–25)
CALCIUM SPEC-SCNC: 8.6 MG/DL (ref 8.2–9.6)
CHLORIDE SERPL-SCNC: 106 MMOL/L (ref 98–107)
CO2 SERPL-SCNC: 22 MMOL/L (ref 22–29)
CREAT SERPL-MCNC: 1.03 MG/DL (ref 0.57–1)
DEPRECATED RDW RBC AUTO: 53 FL (ref 37–54)
EGFRCR SERPLBLD CKD-EPI 2021: 51.1 ML/MIN/1.73
EOSINOPHIL # BLD AUTO: 0.24 10*3/MM3 (ref 0–0.4)
EOSINOPHIL NFR BLD AUTO: 2 % (ref 0.3–6.2)
ERYTHROCYTE [DISTWIDTH] IN BLOOD BY AUTOMATED COUNT: 14.8 % (ref 12.3–15.4)
GLOBULIN UR ELPH-MCNC: 2.4 GM/DL
GLUCOSE SERPL-MCNC: 205 MG/DL (ref 65–99)
HCT VFR BLD AUTO: 35.4 % (ref 34–46.6)
HGB BLD-MCNC: 11.3 G/DL (ref 12–15.9)
IMM GRANULOCYTES # BLD AUTO: 0.2 10*3/MM3 (ref 0–0.05)
IMM GRANULOCYTES NFR BLD AUTO: 1.7 % (ref 0–0.5)
LYMPHOCYTES # BLD AUTO: 1.52 10*3/MM3 (ref 0.7–3.1)
LYMPHOCYTES NFR BLD AUTO: 12.6 % (ref 19.6–45.3)
MAGNESIUM SERPL-MCNC: 2.1 MG/DL (ref 1.7–2.3)
MCH RBC QN AUTO: 30.9 PG (ref 26.6–33)
MCHC RBC AUTO-ENTMCNC: 31.9 G/DL (ref 31.5–35.7)
MCV RBC AUTO: 96.7 FL (ref 79–97)
MONOCYTES # BLD AUTO: 0.73 10*3/MM3 (ref 0.1–0.9)
MONOCYTES NFR BLD AUTO: 6 % (ref 5–12)
NEUTROPHILS NFR BLD AUTO: 77 % (ref 42.7–76)
NEUTROPHILS NFR BLD AUTO: 9.3 10*3/MM3 (ref 1.7–7)
NRBC BLD AUTO-RTO: 0 /100 WBC (ref 0–0.2)
PLATELET # BLD AUTO: 214 10*3/MM3 (ref 140–450)
PMV BLD AUTO: 12.4 FL (ref 6–12)
POTASSIUM SERPL-SCNC: 4.2 MMOL/L (ref 3.5–5.2)
PROT SERPL-MCNC: 5.4 G/DL (ref 6–8.5)
RBC # BLD AUTO: 3.66 10*6/MM3 (ref 3.77–5.28)
SODIUM SERPL-SCNC: 136 MMOL/L (ref 136–145)
WBC NRBC COR # BLD AUTO: 12.07 10*3/MM3 (ref 3.4–10.8)

## 2025-01-30 PROCEDURE — 83735 ASSAY OF MAGNESIUM: CPT | Performed by: FAMILY MEDICINE

## 2025-01-30 PROCEDURE — 80053 COMPREHEN METABOLIC PANEL: CPT | Performed by: FAMILY MEDICINE

## 2025-01-30 PROCEDURE — 99232 SBSQ HOSP IP/OBS MODERATE 35: CPT | Performed by: NURSE PRACTITIONER

## 2025-01-30 PROCEDURE — 85025 COMPLETE CBC W/AUTO DIFF WBC: CPT | Performed by: FAMILY MEDICINE

## 2025-01-30 RX ADMIN — Medication 10 ML: at 21:00

## 2025-01-30 RX ADMIN — METOPROLOL SUCCINATE 25 MG: 25 TABLET, EXTENDED RELEASE ORAL at 08:28

## 2025-01-30 RX ADMIN — Medication 10 ML: at 11:27

## 2025-01-30 RX ADMIN — APIXABAN 2.5 MG: 2.5 TABLET, FILM COATED ORAL at 08:28

## 2025-01-30 RX ADMIN — APIXABAN 2.5 MG: 2.5 TABLET, FILM COATED ORAL at 20:52

## 2025-01-30 RX ADMIN — LEVOTHYROXINE SODIUM 88 MCG: 0.09 TABLET ORAL at 05:42

## 2025-01-30 NOTE — PROGRESS NOTES
[facility] Medicine Services  PROGRESS NOTE    Patient Name: Laureen Nettles  : 1932  MRN: [id number]    Date of Admission: 2025  Primary Care Physician: Criselda Padgett MD    Subjective   Subjective     CC:  F/u AMS    HPI:  Patient resting in bed.  Tells me she feels lethargic today but denies concerns otherwise.  Denies shortness of breath.  Is alert and oriented x 3.      Objective   Objective     Vital Signs:   Temp:  [98.2 °F (36.8 °C)-98.3 °F (36.8 °C)] 98.3 °F (36.8 °C)  Heart Rate:  [93-95] 95  Resp:  [18] 18  BP: ()/(50-69) 120/69     Physical Exam:  Constitutional: No acute distress, awake, alert  HENT: NCAT, mucous membranes moist  Respiratory: Clear to auscultation bilaterally, respiratory effort normal, room air  Cardiovascular: RRR, no murmurs, rubs, or gallops  Gastrointestinal: Positive bowel sounds, soft, nontender, nondistended  Musculoskeletal: No bilateral ankle edema  Psychiatric: Appropriate affect, cooperative  Neurologic: Oriented x 3, moves all extremities, speech clear  Skin: No rashes      Results Reviewed:  LAB RESULTS:      Lab 25  1017 25  0904 25  1349 25  1310 25  1153   WBC 12.38* 11.30* 12.98*  --  10.76   HEMOGLOBIN 13.7 13.4 12.1  --  15.1   HEMATOCRIT 43.6 42.5 37.6  --  46.0   PLATELETS 210 184 215  --  246   NEUTROS ABS 9.77* 8.44* 10.60*  --  8.45*   IMMATURE GRANS (ABS) 0.13* 0.13* 0.06*  --  0.05   LYMPHS ABS 1.59 1.72 1.42  --  1.45   MONOS ABS 0.64 0.76 0.83  --  0.72   EOS ABS 0.17 0.17 0.03  --  0.03   MCV 98.2* 97.7* 94.9  --  94.3   PROCALCITONIN  --   --   --  0.24  --    HSTROP T  --   --   --  35* 35*         Lab 25  1017 25  0855 25  0301 25  1349 25  1153   SODIUM 139 135* 141 141 139   POTASSIUM 4.3 4.2 3.5 3.9 4.8   CHLORIDE 106 107 106 104 94*   CO2 20.0* 18.0* 26.0 31.0* 25.0   ANION GAP 13.0 10.0 9.0 6.0 20.0*   BUN 29* 24* 45* 48* 60*   CREATININE  0.92 0.74 1.02* 1.56* 1.39*   EGFR 58.5* 76.0 51.7* 31.1* 35.7*   GLUCOSE 100* 94 110* 138* 106*   CALCIUM 9.0 8.8 9.5 11.0* 13.2*   IONIZED CALCIUM  --   --   --  1.37*  --    MAGNESIUM 2.4* 2.1  --  1.9 2.4*   PHOSPHORUS  --   --  4.2 1.5*  --    TSH  --   --   --  3.570  --          Lab 01/29/25  1017 01/28/25  0855 01/24/25  1153   TOTAL PROTEIN 6.2 5.6* 7.5   ALBUMIN 3.3* 2.6* 4.2   GLOBULIN 2.9 3.0 3.3   ALT (SGPT) 8 5 9   AST (SGOT) 19 19 17   BILIRUBIN 0.3 0.3 0.4   ALK PHOS 58 51 71         Lab 01/24/25  1310 01/24/25  1153   HSTROP T 35* 35*                 Brief Urine Lab Results  (Last result in the past 365 days)        Color   Clarity   Blood   Leuk Est   Nitrite   Protein   CREAT   Urine HCG        01/24/25 1222 Yellow   Turbid   Moderate (2+)   Large (3+)   Negative   30 mg/dL (1+)                   Microbiology Results Abnormal       Procedure Component Value - Date/Time    COVID PRE-OP / PRE-PROCEDURE SCREENING ORDER (NO ISOLATION) - Swab, Nasopharynx [[id number]]  (Abnormal) Collected: 01/24/25 1201    Lab Status: Final result Specimen: Swab from Nasopharynx Updated: 01/24/25 1256    Narrative:      The following orders were created for panel order COVID PRE-OP / PRE-PROCEDURE SCREENING ORDER (NO ISOLATION) - Swab, Nasopharynx.  Procedure                               Abnormality         Status                     ---------                               -----------         ------                     COVID-19 and FLU A/B PCR...[[id number]]  Abnormal            Final result                 Please view results for these tests on the individual orders.    COVID-19 and FLU A/B PCR, 1 HR TAT - Swab, Nasopharynx [[id number]]  (Abnormal) Collected: 01/24/25 1201    Lab Status: Final result Specimen: Swab from Nasopharynx Updated: 01/24/25 1256     COVID19 Detected     Influenza A PCR Not Detected     Influenza B PCR Not Detected    Narrative:      Fact sheet for providers:  https://www.fda.gov/media/434314/download    Fact sheet for patients: https://www.fda.gov/media/459703/download    Test performed by PCR.  Influenza A and Influenza B negative results should be considered presumptive in samples that have a positive SARS-CoV-2 result.    Competitive inhibition studies showed that SARS-CoV-2 virus, when present at concentrations above 3.6E+04 copies/mL, can inhibit the detection and amplification of influenza A and influenza B virus RNA if present at or below 1.8E+02 copies/mL or 4.9E+02 copies/mL, respectively, and may lead to false negative influenza virus results. If co-infection with influenza A or influenza B virus is suspected in samples with a positive SARS-CoV-2 result, the sample should be re-tested with another FDA cleared, approved, or authorized influenza test, if influenza virus detection would change clinical management.            No radiology results from the last 24 hrs    Results for orders placed during the hospital encounter of 08/29/21    Adult Transthoracic Echo Complete W/ Cont if Necessary Per Protocol    Interpretation Summary  · Estimated left ventricular EF = 30-35%. Moderate to severe LV systolic dysfunction.  · Left ventricular wall thickness is consistent with mild concentric hypertrophy.  · Left ventricular diastolic function is consistent with (grade II w/high LAP) pseudonormalization.  · Severe aortic valve regurgitation is present.  · Moderate to severe mitral valve regurgitation is present.  · Mild tricuspid valve regurgitation is present  · Estimated right ventricular systolic pressure from tricuspid regurgitation is normal (<35 mmHg).      Current medications:  Scheduled Meds:apixaban, 2.5 mg, Oral, BID  [Held by provider] furosemide, 20 mg, Oral, Daily  levothyroxine, 88 mcg, Oral, Q AM  metoprolol succinate XL, 25 mg, Oral, Daily  sodium chloride, 10 mL, Intravenous, Q12H  [Held by provider] spironolactone, 25 mg, Oral, Daily      Continuous  Infusions:   PRN Meds:.  acetaminophen    senna-docusate sodium **AND** polyethylene glycol **AND** bisacodyl **AND** bisacodyl    Calcium Replacement - Follow Nurse / BPA Driven Protocol    Magnesium Standard Dose Replacement - Follow Nurse / BPA Driven Protocol    ondansetron    Phosphorus Replacement - Follow Nurse / BPA Driven Protocol    Polyvinyl Alcohol-Povidone PF    Potassium Replacement - Follow Nurse / BPA Driven Protocol    sodium chloride    sodium chloride    sodium chloride    Assessment & Plan   Assessment & Plan     Active Hospital Problems    Diagnosis  POA    **UTI (urinary tract infection) [N39.0]  Yes    Dehydration [E86.0]  Yes      Resolved Hospital Problems   No resolved problems to display.        Brief Hospital Course to date:  Laureen Nettles is a 89 y.o. female with history of hypothyroidism and paroxysmal A-fib who lives at [facility] and was sent for lethargy and mental status changes.  Labs significant for hypercalcemia and mild MEENAKSHI.       This patient's problems and plans were partially entered by my partner and updated as appropriate by me 01/30/25.      Lethargy, improved   Hypercalcemia  -Likely multifactorial with hypercalcemia and dehydration found on admission, recent COVID  -PTH, TSH wnl  -Calcium supplements stopped.  Hydrated.  Now eating.     -Pt was on diuretics until admission - would stop or make them PRN at DC.  -AM BMP, mag     COVID +  Possible L base infiltrate vs atelectasis  - COVID diagnosed on 1/21/25   - ? L basilar infiltrate. Infiltrate is likely from COVID and not bacterial or infectious. Patient also s/p cefazolin for suspected UTI, which would cover most organisms   - denies symptoms.Continues on RA as of 1/30  - WBCs 12.38, neutrophilia on 1/29, afebrile  - AM CBC w/diff    ? UTI, ruled out  -UA + but contaminated , treated empirically with cefazolin  -Ucx no growth, abx stopped 1/27, received 6 doses     MEENAKSHI - resolved   - baseline Cr 0.9,  but on admission was 1.4; now 1.0   - spironolactone and Lasix held, BP normotensive     Dual chamber PPM   PAFib   History of CHF, EF 30% in 2021  - follows with Dr. sAcencio  - started on lasix/spironolactone for this in 2021  - hold diuretics and perhaps stop permanently, no edema/evidence of volume overload on exam  - metoprolol for rate control, low EF   - Eliquis for anticoagulation     Weakness/debility  -PT, OT following  -Currently too weak to return to assisted-living, plan is SNF    Expected Discharge Location and Transportation: SNF  Expected Discharge pending bed offer, insurance approval  Expected Discharge Date: 2/1/2025; Expected Discharge Time:      VTE Prophylaxis:  Pharmacologic & mechanical VTE prophylaxis orders are present.         AM-PAC 6 Clicks Score (PT): 12 (01/29/25 2000)    CODE STATUS:   Code Status and Medical Interventions: CPR (Attempt to Resuscitate); Full Support   Ordered at: 01/24/25 1345     Level Of Support Discussed With:    Patient     Code Status (Patient has no pulse and is not breathing):    CPR (Attempt to Resuscitate)     Medical Interventions (Patient has pulse or is breathing):    Full Support       Vilma Castillo, APRN  01/30/25

## 2025-01-30 NOTE — CASE MANAGEMENT/SOCIAL WORK
Continued Stay Note  [facility]     Patient Name: Laureen Nettles  MRN: [id number]  Today's Date: 1/30/2025    Admit Date: 1/24/2025    Plan: SNF   Discharge Plan       Row Name 01/30/25 1506       Plan    Plan SNF    Patient/Family in Agreement with Plan yes    Plan Comments I spoke with the patient's POA, Arden, by phone today. Ayesha at [facility] does not have any female bed at this time. I spoke with Alesha at [facility]. She stated that the DON is reviewing the patient. As of this time stamp, she has not provided any other information. Arden is also interested in home with home health if needed. Arden and I discussed therapy's recommendation for SNF. If patient improves and can return to her assisted living at [facility], we can consider her returning home provided there is not SNF bed offer. CM to follow up tomorrow. CM following.                   Discharge Codes    No documentation.                 Expected Discharge Date and Time       Expected Discharge Date Expected Discharge Time    Feb 1, 2025               Christine Bennett RN

## 2025-01-31 LAB
ANION GAP SERPL CALCULATED.3IONS-SCNC: 9 MMOL/L (ref 5–15)
BASOPHILS # BLD AUTO: 0.09 10*3/MM3 (ref 0–0.2)
BASOPHILS NFR BLD AUTO: 0.8 % (ref 0–1.5)
BUN SERPL-MCNC: 30 MG/DL (ref 8–23)
BUN/CREAT SERPL: 37.5 (ref 7–25)
CALCIUM SPEC-SCNC: 9 MG/DL (ref 8.2–9.6)
CHLORIDE SERPL-SCNC: 107 MMOL/L (ref 98–107)
CO2 SERPL-SCNC: 22 MMOL/L (ref 22–29)
CREAT SERPL-MCNC: 0.8 MG/DL (ref 0.57–1)
DEPRECATED RDW RBC AUTO: 50.6 FL (ref 37–54)
EGFRCR SERPLBLD CKD-EPI 2021: 69.2 ML/MIN/1.73
EOSINOPHIL # BLD AUTO: 0.21 10*3/MM3 (ref 0–0.4)
EOSINOPHIL NFR BLD AUTO: 1.8 % (ref 0.3–6.2)
ERYTHROCYTE [DISTWIDTH] IN BLOOD BY AUTOMATED COUNT: 14.6 % (ref 12.3–15.4)
GLUCOSE SERPL-MCNC: 90 MG/DL (ref 65–99)
HCT VFR BLD AUTO: 38.3 % (ref 34–46.6)
HGB BLD-MCNC: 12.4 G/DL (ref 12–15.9)
IMM GRANULOCYTES # BLD AUTO: 0.15 10*3/MM3 (ref 0–0.05)
IMM GRANULOCYTES NFR BLD AUTO: 1.3 % (ref 0–0.5)
LYMPHOCYTES # BLD AUTO: 1.58 10*3/MM3 (ref 0.7–3.1)
LYMPHOCYTES NFR BLD AUTO: 13.8 % (ref 19.6–45.3)
MAGNESIUM SERPL-MCNC: 2.2 MG/DL (ref 1.7–2.3)
MCH RBC QN AUTO: 30.8 PG (ref 26.6–33)
MCHC RBC AUTO-ENTMCNC: 32.4 G/DL (ref 31.5–35.7)
MCV RBC AUTO: 95.3 FL (ref 79–97)
MONOCYTES # BLD AUTO: 0.77 10*3/MM3 (ref 0.1–0.9)
MONOCYTES NFR BLD AUTO: 6.7 % (ref 5–12)
NEUTROPHILS NFR BLD AUTO: 75.6 % (ref 42.7–76)
NEUTROPHILS NFR BLD AUTO: 8.61 10*3/MM3 (ref 1.7–7)
NRBC BLD AUTO-RTO: 0 /100 WBC (ref 0–0.2)
PLATELET # BLD AUTO: 223 10*3/MM3 (ref 140–450)
PMV BLD AUTO: 12 FL (ref 6–12)
POTASSIUM SERPL-SCNC: 4 MMOL/L (ref 3.5–5.2)
RBC # BLD AUTO: 4.02 10*6/MM3 (ref 3.77–5.28)
SODIUM SERPL-SCNC: 138 MMOL/L (ref 136–145)
WBC NRBC COR # BLD AUTO: 11.41 10*3/MM3 (ref 3.4–10.8)

## 2025-01-31 PROCEDURE — 80048 BASIC METABOLIC PNL TOTAL CA: CPT | Performed by: NURSE PRACTITIONER

## 2025-01-31 PROCEDURE — 83735 ASSAY OF MAGNESIUM: CPT | Performed by: NURSE PRACTITIONER

## 2025-01-31 PROCEDURE — 85025 COMPLETE CBC W/AUTO DIFF WBC: CPT | Performed by: NURSE PRACTITIONER

## 2025-01-31 RX ORDER — AMOXICILLIN 250 MG
2 CAPSULE ORAL 2 TIMES DAILY
Status: DISCONTINUED | OUTPATIENT
Start: 2025-01-31 | End: 2025-02-03 | Stop reason: HOSPADM

## 2025-01-31 RX ORDER — BISACODYL 10 MG
10 SUPPOSITORY, RECTAL RECTAL DAILY PRN
Status: DISCONTINUED | OUTPATIENT
Start: 2025-01-31 | End: 2025-02-03 | Stop reason: HOSPADM

## 2025-01-31 RX ORDER — POLYETHYLENE GLYCOL 3350 17 G/17G
17 POWDER, FOR SOLUTION ORAL DAILY PRN
Status: DISCONTINUED | OUTPATIENT
Start: 2025-01-31 | End: 2025-02-03 | Stop reason: HOSPADM

## 2025-01-31 RX ORDER — BISACODYL 5 MG/1
5 TABLET, DELAYED RELEASE ORAL DAILY PRN
Status: DISCONTINUED | OUTPATIENT
Start: 2025-01-31 | End: 2025-02-03 | Stop reason: HOSPADM

## 2025-01-31 RX ADMIN — LEVOTHYROXINE SODIUM 88 MCG: 0.09 TABLET ORAL at 05:35

## 2025-01-31 RX ADMIN — APIXABAN 2.5 MG: 2.5 TABLET, FILM COATED ORAL at 09:30

## 2025-01-31 RX ADMIN — SENNOSIDES AND DOCUSATE SODIUM 2 TABLET: 50; 8.6 TABLET ORAL at 11:23

## 2025-01-31 RX ADMIN — APIXABAN 2.5 MG: 2.5 TABLET, FILM COATED ORAL at 21:03

## 2025-01-31 RX ADMIN — METOPROLOL SUCCINATE 25 MG: 25 TABLET, EXTENDED RELEASE ORAL at 09:30

## 2025-01-31 RX ADMIN — Medication 10 ML: at 09:30

## 2025-01-31 RX ADMIN — BISACODYL 5 MG: 5 TABLET, COATED ORAL at 21:03

## 2025-01-31 RX ADMIN — Medication 10 ML: at 21:33

## 2025-01-31 RX ADMIN — SENNOSIDES AND DOCUSATE SODIUM 2 TABLET: 50; 8.6 TABLET ORAL at 21:03

## 2025-01-31 NOTE — PLAN OF CARE
Goal Outcome Evaluation:  Plan of Care Reviewed With: patient        Progress: no change  Outcome Evaluation: VSS on RA. Hard of hearing. Oriented to self, place, and year. Forgetful. Good PO intake. Adequate UOP. No BM this shift. Purewick in place. Turns conducted. Sleeping well. No c/o. Awaiting rehab.

## 2025-01-31 NOTE — DISCHARGE PLACEMENT REQUEST
"Rosa Osuna (89 y.o. Female)       Date of Birth   1932    Social Security Number       Address   [address] [id number]    Home Phone   [phone]    MRN   [id number]       [facility]    Marital Status   Single                            Admission Date   25    Admission Type   Emergency    Admitting Provider   JOSUE Benson DO    Attending Provider   JOSUE Benson DO    Department, Room/Bed   [facility] 5B, N532/1       Discharge Date       Discharge Disposition       Discharge Destination                                 Attending Provider: JOSUE Benson DO    Allergies: Penicillins, Potassium-containing Compounds    Isolation: Contact Air   Infection: COVID (confirmed) (25)   Code Status: CPR    Ht: 152.4 cm (60\")   Wt: 39 kg (86 lb)    Admission Cmt: None   Principal Problem: UTI (urinary tract infection) [N39.0]                   Active Insurance as of 2025       Primary Coverage       Payor Plan Insurance Group Employer/Plan Group    HUMANA MEDICARE REPLACEMENT HUMANA MED ADV HMO [id number]       Payor Plan Address Payor Plan Phone Number Payor Plan Fax Number Effective Dates    [address] [address] [phone]  2025 - None Entered    formerly [facility] [id number]         Subscriber Name Subscriber Birth Date Member ID       ROSA OSUNA 1932 [id number]                     Emergency Contacts        (Rel.) Home Phone Work Phone Mobile Phone    Arden Marie (Other) -- -- [phone]    Shara Marie (Relative) -- -- [phone]                 History & Physical        Caroline Chen DO at 25 [facility]7              [facility] Medicine Services  HISTORY AND PHYSICAL    Patient Name: Rosa Osuna  : 1932  MRN: [id number]  Primary Care Physician: Criselda Padgett MD  Date of admission: 2025      Subjective  Subjective     Chief Complaint:  Lethargy    HPI:  Rosa GRISSOM " Yoon is a 89 y.o. female with history of hypothyroidism and paroxysmal A-fib who lives at [place] and sent for lethargy, mental status changes.  Labs significant for hypercalcemia and mild MEENAKSHI.  Patient is poor historian and significantly hard of hearing so difficult to get good history while wearing a mask.  She denies any pain or difficulty breathing.  She is sleepy and opens her eyes to answer a question and then falls back to sleep.      Personal History     Past Medical History:   Diagnosis Date    Aortic valve regurgitation     B12 deficiency     COVID-19 02/2022    Falls     Heart failure     HLD (hyperlipidemia)     HTN (hypertension)     Hypothyroidism     Pacemaker            Past Surgical History:   Procedure Laterality Date    ANAL FISTULA REPAIR      TONSILLECTOMY         Family History: family history includes Heart disease in her father; No Known Problems in her mother.     Social History:  reports that she has never smoked. She has never used smokeless tobacco. She reports that she does not drink alcohol and does not use drugs.  Social History     Social History Narrative    Caffeine 1 serving of coffee in the morning       Medications:  Available home medication information reviewed.  Cholecalciferol, Multiple Vitamins-Minerals, alendronate, apixaban, calcium carbonate, cyanocobalamin, furosemide, levothyroxine, metoprolol succinate XL, rosuvastatin, and spironolactone    Allergies   Allergen Reactions    Penicillins Anaphylaxis    Potassium-Containing Compounds Rash       Objective  Objective     Vital Signs:   Temp:  [97.6 °F (36.4 °C)] 97.6 °F (36.4 °C)  Heart Rate:  [62-79] 62  Resp:  [16] 16  BP: (109-132)/(41-47) 132/43       Physical Exam  Constitutional:       Appearance: She is ill-appearing.   HENT:      Head: Normocephalic and atraumatic.      Mouth/Throat:      Mouth: Mucous membranes are dry.   Eyes:      Pupils: Pupils are equal, round, and reactive to light.   Cardiovascular:       Rate and Rhythm: Normal rate and regular rhythm.      Heart sounds: Murmur heard.   Pulmonary:      Effort: No respiratory distress.      Breath sounds: Normal breath sounds.   Abdominal:      General: There is no distension.   Musculoskeletal:         General: No swelling or tenderness.   Neurological:      Mental Status: She is disoriented.            Result Review:  I have personally reviewed the results from the time of this admission to 1/24/2025 14:00 EST and agree with these findings:  [x]  Laboratory list / accordion  []  Microbiology  []  Radiology  []  EKG/Telemetry   []  Cardiology/Vascular   []  Pathology  [x]  Old records  []  Other:  Most notable findings include:       LAB RESULTS:      Lab 01/24/25  1153   WBC 10.76   HEMOGLOBIN 15.1   HEMATOCRIT 46.0   PLATELETS 246   NEUTROS ABS 8.45*   IMMATURE GRANS (ABS) 0.05   LYMPHS ABS 1.45   MONOS ABS 0.72   EOS ABS 0.03   MCV 94.3         Lab 01/24/25  1153   SODIUM 139   POTASSIUM 4.8   CHLORIDE 94*   CO2 25.0   ANION GAP 20.0*   BUN 60*   CREATININE 1.39*   EGFR 35.7*   GLUCOSE 106*   CALCIUM 13.2*   MAGNESIUM 2.4*         Lab 01/24/25  1153   TOTAL PROTEIN 7.5   ALBUMIN 4.2   GLOBULIN 3.3   ALT (SGPT) 9   AST (SGOT) 17   BILIRUBIN 0.4   ALK PHOS 71         Lab 01/24/25  1310 01/24/25  1153   HSTROP T 35* 35*                 UA          1/24/2025    12:22   Urinalysis   Squamous Epithelial Cells, UA 7-12    Specific [place], UA 1.017    Ketones, UA 15 mg/dL (1+)    Blood, UA Moderate (2+)    Leukocytes, UA Large (3+)    Nitrite, UA Negative    RBC, UA 3-5    WBC, UA Too Numerous to Count    Bacteria, UA 4+        Microbiology Results (last 10 days)       Procedure Component Value - Date/Time    COVID PRE-OP / PRE-PROCEDURE SCREENING ORDER (NO ISOLATION) - Swab, Nasopharynx [[id number]]  (Abnormal) Collected: 01/24/25 1201    Lab Status: Final result Specimen: Swab from Nasopharynx Updated: 01/24/25 1256    Narrative:      The following orders were created  for panel order COVID PRE-OP / PRE-PROCEDURE SCREENING ORDER (NO ISOLATION) - Swab, Nasopharynx.  Procedure                               Abnormality         Status                     ---------                               -----------         ------                     COVID-19 and FLU A/B PCR...[[id number]]  Abnormal            Final result                 Please view results for these tests on the individual orders.    COVID-19 and FLU A/B PCR, 1 HR TAT - Swab, Nasopharynx [[id number]]  (Abnormal) Collected: 01/24/25 1201    Lab Status: Final result Specimen: Swab from Nasopharynx Updated: 01/24/25 1256     COVID19 Detected     Influenza A PCR Not Detected     Influenza B PCR Not Detected    Narrative:      Fact sheet for providers: https://www.fda.gov/media/087547/download    Fact sheet for patients: https://www.fda.gov/media/224881/download    Test performed by PCR.  Influenza A and Influenza B negative results should be considered presumptive in samples that have a positive SARS-CoV-2 result.    Competitive inhibition studies showed that SARS-CoV-2 virus, when present at concentrations above 3.6E+04 copies/mL, can inhibit the detection and amplification of influenza A and influenza B virus RNA if present at or below 1.8E+02 copies/mL or 4.9E+02 copies/mL, respectively, and may lead to false negative influenza virus results. If co-infection with influenza A or influenza B virus is suspected in samples with a positive SARS-CoV-2 result, the sample should be re-tested with another FDA cleared, approved, or authorized influenza test, if influenza virus detection would change clinical management.            XR Chest 1 View    Result Date: 1/24/2025  XR CHEST 1 VW Date of Exam: 1/24/2025 11:49 AM EST Indication: Weak/Dizzy/AMS triage protocol Comparison: Chest x-ray 8/29/2021 Findings: The heart is stable in size. There is some left lower lobe airspace disease at the costophrenic angle and suspected underlying  pleural effusion. No evidence for pneumothorax. Cardiac pacemaker is noted.     Impression: Impression: Some suspected left lower lobe airspace disease of the costophrenic angle with potential trace effusion. Electronically Signed: Adriana Asencio MD  1/24/2025 12:22 PM EST  Workstation ID: [id number]     Results for orders placed during the hospital encounter of 08/29/21    Adult Transthoracic Echo Complete W/ Cont if Necessary Per Protocol    Interpretation Summary  · Estimated left ventricular EF = 30-35%. Moderate to severe LV systolic dysfunction.  · Left ventricular wall thickness is consistent with mild concentric hypertrophy.  · Left ventricular diastolic function is consistent with (grade II w/high LAP) pseudonormalization.  · Severe aortic valve regurgitation is present.  · Moderate to severe mitral valve regurgitation is present.  · Mild tricuspid valve regurgitation is present  · Estimated right ventricular systolic pressure from tricuspid regurgitation is normal (<35 mmHg).      Assessment & Plan  Assessment & Plan       UTI (urinary tract infection)    89-year-old female with history of paroxysmal A-fib, admitted for lethargy and mental status changes.  Labs significant for hypercalcemia as well as possible UTI.      Mental status changes  -Suspect multifactorial  -Labs significant for likely decreased p.o. intake as well as hypercalcemia  -Hold vitamin D and calcium supplementation  -Normal saline at 100 cc/h  -Repeat ionized calcium in a.m.  -Check PTH  -Check TSH in a.m.    MEENAKSHI  -Secondary to decreased p.o. intake  -Hold Lasix and spironolactone  -Check renal function in a.m. after IVF      Recent COVID infection  -On room air  -Monitor for now    Hypercalcemia  -Suspect secondary to supplementation  -See above    Possible UTI  -Procalcitonin added, pending  -Add urine culture  -Will treat with cefazolin empirically for now, DC if urine culture comes back without growth. Has allergy to PCN but  patient is poor historian and no micro history on file here    Hard of hearing    Paroxysmal A-fib  -On low-dose Eliquis  -Follows with cardiology here  -Continue metoprolol XL with hold parameters          VTE Prophylaxis:  Pharmacologic & mechanical VTE prophylaxis orders are present.          CODE STATUS:    Code Status and Medical Interventions: CPR (Attempt to Resuscitate); Full Support   Ordered at: 25 1345     Level Of Support Discussed With:    Patient     Code Status (Patient has no pulse and is not breathing):    CPR (Attempt to Resuscitate)     Medical Interventions (Patient has pulse or is breathing):    Full Support       Expected Discharge   Expected discharge date/ time has not been documented.     Caroline Chen DO  25     Electronically signed by Caroline Chen DO at 25 1400          Physician Progress Notes (most recent note)        Vilma Castillo APRN at 25 0725              [facility] Medicine Services  PROGRESS NOTE    Patient Name: Laureen Nettles  : 1932  MRN: [id number]    Date of Admission: 2025  Primary Care Physician: Criselda Padgett MD    Subjective   Subjective     CC:  F/u AMS    HPI:  Patient resting in bed.  Tells me she feels lethargic today but denies concerns otherwise.  Denies shortness of breath.  Is alert and oriented x 3.      Objective   Objective     Vital Signs:   Temp:  [98.2 °F (36.8 °C)-98.3 °F (36.8 °C)] 98.3 °F (36.8 °C)  Heart Rate:  [93-95] 95  Resp:  [18] 18  BP: ()/(50-69) 120/69     Physical Exam:  Constitutional: No acute distress, awake, alert  HENT: NCAT, mucous membranes moist  Respiratory: Clear to auscultation bilaterally, respiratory effort normal, room air  Cardiovascular: RRR, no murmurs, rubs, or gallops  Gastrointestinal: Positive bowel sounds, soft, nontender, nondistended  Musculoskeletal: No bilateral ankle edema  Psychiatric: Appropriate affect,  cooperative  Neurologic: Oriented x 3, moves all extremities, speech clear  Skin: No rashes      Results Reviewed:  LAB RESULTS:      Lab 01/29/25  1017 01/28/25  0904 01/25/25  1349 01/24/25  1310 01/24/25  1153   WBC 12.38* 11.30* 12.98*  --  10.76   HEMOGLOBIN 13.7 13.4 12.1  --  15.1   HEMATOCRIT 43.6 42.5 37.6  --  46.0   PLATELETS 210 184 215  --  246   NEUTROS ABS 9.77* 8.44* 10.60*  --  8.45*   IMMATURE GRANS (ABS) 0.13* 0.13* 0.06*  --  0.05   LYMPHS ABS 1.59 1.72 1.42  --  1.45   MONOS ABS 0.64 0.76 0.83  --  0.72   EOS ABS 0.17 0.17 0.03  --  0.03   MCV 98.2* 97.7* 94.9  --  94.3   PROCALCITONIN  --   --   --  0.24  --    HSTROP T  --   --   --  35* 35*         Lab 01/29/25  1017 01/28/25  0855 01/26/25  0301 01/25/25  1349 01/24/25  1153   SODIUM 139 135* 141 141 139   POTASSIUM 4.3 4.2 3.5 3.9 4.8   CHLORIDE 106 107 106 104 94*   CO2 20.0* 18.0* 26.0 31.0* 25.0   ANION GAP 13.0 10.0 9.0 6.0 20.0*   BUN 29* 24* 45* 48* 60*   CREATININE 0.92 0.74 1.02* 1.56* 1.39*   EGFR 58.5* 76.0 51.7* 31.1* 35.7*   GLUCOSE 100* 94 110* 138* 106*   CALCIUM 9.0 8.8 9.5 11.0* 13.2*   IONIZED CALCIUM  --   --   --  1.37*  --    MAGNESIUM 2.4* 2.1  --  1.9 2.4*   PHOSPHORUS  --   --  4.2 1.5*  --    TSH  --   --   --  3.570  --          Lab 01/29/25  1017 01/28/25  0855 01/24/25  1153   TOTAL PROTEIN 6.2 5.6* 7.5   ALBUMIN 3.3* 2.6* 4.2   GLOBULIN 2.9 3.0 3.3   ALT (SGPT) 8 5 9   AST (SGOT) 19 19 17   BILIRUBIN 0.3 0.3 0.4   ALK PHOS 58 51 71         Lab 01/24/25  1310 01/24/25  1153   HSTROP T 35* 35*                 Brief Urine Lab Results  (Last result in the past 365 days)        Color   Clarity   Blood   Leuk Est   Nitrite   Protein   CREAT   Urine HCG        01/24/25 1222 Yellow   Turbid   Moderate (2+)   Large (3+)   Negative   30 mg/dL (1+)                   Microbiology Results Abnormal       Procedure Component Value - Date/Time    COVID PRE-OP / PRE-PROCEDURE SCREENING ORDER (NO ISOLATION) - Swab, Nasopharynx  [[id number]]  (Abnormal) Collected: 01/24/25 1201    Lab Status: Final result Specimen: Swab from Nasopharynx Updated: 01/24/25 1256    Narrative:      The following orders were created for panel order COVID PRE-OP / PRE-PROCEDURE SCREENING ORDER (NO ISOLATION) - Swab, Nasopharynx.  Procedure                               Abnormality         Status                     ---------                               -----------         ------                     COVID-19 and FLU A/B PCR...[[id number]]  Abnormal            Final result                 Please view results for these tests on the individual orders.    COVID-19 and FLU A/B PCR, 1 HR TAT - Swab, Nasopharynx [[id number]]  (Abnormal) Collected: 01/24/25 1201    Lab Status: Final result Specimen: Swab from Nasopharynx Updated: 01/24/25 1256     COVID19 Detected     Influenza A PCR Not Detected     Influenza B PCR Not Detected    Narrative:      Fact sheet for providers: https://www.fda.gov/media/728369/download    Fact sheet for patients: https://www.fda.gov/media/324520/download    Test performed by PCR.  Influenza A and Influenza B negative results should be considered presumptive in samples that have a positive SARS-CoV-2 result.    Competitive inhibition studies showed that SARS-CoV-2 virus, when present at concentrations above 3.6E+04 copies/mL, can inhibit the detection and amplification of influenza A and influenza B virus RNA if present at or below 1.8E+02 copies/mL or 4.9E+02 copies/mL, respectively, and may lead to false negative influenza virus results. If co-infection with influenza A or influenza B virus is suspected in samples with a positive SARS-CoV-2 result, the sample should be re-tested with another FDA cleared, approved, or authorized influenza test, if influenza virus detection would change clinical management.            No radiology results from the last 24 hrs    Results for orders placed during the hospital encounter of 08/29/21    Adult  Transthoracic Echo Complete W/ Cont if Necessary Per Protocol    Interpretation Summary  · Estimated left ventricular EF = 30-35%. Moderate to severe LV systolic dysfunction.  · Left ventricular wall thickness is consistent with mild concentric hypertrophy.  · Left ventricular diastolic function is consistent with (grade II w/high LAP) pseudonormalization.  · Severe aortic valve regurgitation is present.  · Moderate to severe mitral valve regurgitation is present.  · Mild tricuspid valve regurgitation is present  · Estimated right ventricular systolic pressure from tricuspid regurgitation is normal (<35 mmHg).      Current medications:  Scheduled Meds:apixaban, 2.5 mg, Oral, BID  [Held by provider] furosemide, 20 mg, Oral, Daily  levothyroxine, 88 mcg, Oral, Q AM  metoprolol succinate XL, 25 mg, Oral, Daily  sodium chloride, 10 mL, Intravenous, Q12H  [Held by provider] spironolactone, 25 mg, Oral, Daily      Continuous Infusions:   PRN Meds:.  acetaminophen    senna-docusate sodium **AND** polyethylene glycol **AND** bisacodyl **AND** bisacodyl    Calcium Replacement - Follow Nurse / BPA Driven Protocol    Magnesium Standard Dose Replacement - Follow Nurse / BPA Driven Protocol    ondansetron    Phosphorus Replacement - Follow Nurse / BPA Driven Protocol    Polyvinyl Alcohol-Povidone PF    Potassium Replacement - Follow Nurse / BPA Driven Protocol    sodium chloride    sodium chloride    sodium chloride    Assessment & Plan   Assessment & Plan     Active Hospital Problems    Diagnosis  POA    **UTI (urinary tract infection) [N39.0]  Yes    Dehydration [E86.0]  Yes      Resolved Hospital Problems   No resolved problems to display.        Brief Hospital Course to date:  Laureen Nettles is a 89 y.o. female with history of hypothyroidism and paroxysmal A-fib who lives at [facility] and was sent for lethargy and mental status changes.  Labs significant for hypercalcemia and mild MEENAKSHI.       This patient's  problems and plans were partially entered by my partner and updated as appropriate by me 01/30/25.      Lethargy, improved   Hypercalcemia  -Likely multifactorial with hypercalcemia and dehydration found on admission, recent COVID  -PTH, TSH wnl  -Calcium supplements stopped.  Hydrated.  Now eating.     -Pt was on diuretics until admission - would stop or make them PRN at DC.  -AM BMP, mag     COVID +  Possible L base infiltrate vs atelectasis  - COVID diagnosed on 1/21/25   - ? L basilar infiltrate. Infiltrate is likely from COVID and not bacterial or infectious. Patient also s/p cefazolin for suspected UTI, which would cover most organisms   - denies symptoms.Continues on RA as of 1/30  - WBCs 12.38, neutrophilia on 1/29, afebrile  - AM CBC w/diff    ? UTI, ruled out  -UA + but contaminated , treated empirically with cefazolin  -Ucx no growth, abx stopped 1/27, received 6 doses     MEENAKSHI - resolved   - baseline Cr 0.9, but on admission was 1.4; now 1.0   - spironolactone and Lasix held, BP normotensive     Dual chamber PPM   PAFib   History of CHF, EF 30% in 2021  - follows with Dr. Ascencio  - started on lasix/spironolactone for this in 2021  - hold diuretics and perhaps stop permanently, no edema/evidence of volume overload on exam  - metoprolol for rate control, low EF   - Eliquis for anticoagulation     Weakness/debility  -PT, OT following  -Currently too weak to return to assisted-living, plan is SNF    Expected Discharge Location and Transportation: SNF  Expected Discharge pending bed offer, insurance approval  Expected Discharge Date: 2/1/2025; Expected Discharge Time:      VTE Prophylaxis:  Pharmacologic & mechanical VTE prophylaxis orders are present.         AM-PAC 6 Clicks Score (PT): 12 (01/29/25 2000)    CODE STATUS:   Code Status and Medical Interventions: CPR (Attempt to Resuscitate); Full Support   Ordered at: 01/24/25 1345     Level Of Support Discussed With:    Patient     Code Status (Patient has  no pulse and is not breathing):    CPR (Attempt to Resuscitate)     Medical Interventions (Patient has pulse or is breathing):    Full Support       NASREEN Ni  25        Electronically signed by Vilma Castillo APRN at 25 1325          Physical Therapy Notes (most recent note)        Josephine Barnhart, PT at 25 0827  Version 1 of 1         Patient Name: Laureen Nettles  : 1932    MRN: [id number]                              Today's Date: 2025       Admit Date: 2025    Visit Dx:     ICD-10-CM ICD-9-CM   1. Urinary tract infection without hematuria, site unspecified  N39.0 599.0   2. Generalized weakness  R53.1 780.79   3. Renal insufficiency  N28.9 593.9   4. Dehydration  E86.0 276.51   5. Hypercalcemia  E83.52 275.42   6. COVID-19 virus infection  U07.1 079.89     Patient Active Problem List   Diagnosis    Hyperlipidemia    Hypothyroidism    Cardiac pacemaker in situ    Aortic valve regurgitation    HTN (hypertension)    Osteoporosis    Acquired cystic kidney disease    Acute systolic CHF (congestive heart failure)    UTI (urinary tract infection)    Dehydration     Past Medical History:   Diagnosis Date    Aortic valve regurgitation     B12 deficiency     COVID-19 2022    Falls     Heart failure     HLD (hyperlipidemia)     HTN (hypertension)     Hypothyroidism     Pacemaker      Past Surgical History:   Procedure Laterality Date    ANAL FISTULA REPAIR      TONSILLECTOMY        General Information       Row Name 25 1035          Physical Therapy Time and Intention    Document Type therapy note (daily note)  -ND     Mode of Treatment physical therapy  -ND       Row Name 25 1035          General Information    Patient Profile Reviewed yes  -ND     Existing Precautions/Restrictions fall;other (see comments)  Severely [place]. Brief with mobility.  -ND     Barriers to Rehab previous functional deficit;hearing deficit  -ND       Row Name 25 1031           Cognition    Orientation Status (Cognition) oriented to;person;place;disoriented to;time;other (see comments)  -ND       Row Name 01/29/25 1035          Safety Issues/Impairments Affecting Functional Mobility    Safety Issues Affecting Function (Mobility) awareness of need for assistance;insight into deficits/self-awareness;positioning of assistive device;safety precautions follow-through/compliance;sequencing abilities;safety precaution awareness  -ND     Impairments Affecting Function (Mobility) balance;endurance/activity tolerance;strength;postural/trunk control  -ND               User Key  (r) = Recorded By, (t) = Taken By, (c) = Cosigned By      Initials Name Provider Type    ND Josephine Barnhart, PT Physical Therapist                   Mobility       Row Name 01/29/25 1037          Bed Mobility    Comment, (Bed Mobility) Pt sitting UIC with OT upon PT arrival.  -ND       Row Name 01/29/25 1037          Sit-Stand Transfer    Sit-Stand [place] (Transfers) contact guard;verbal cues;nonverbal cues (demo/gesture)  -ND     Assistive Device (Sit-Stand Transfers) walker, front-wheeled  -ND     Comment, (Sit-Stand Transfer) from chair, cues for hand placement.  -ND       Row Name 01/29/25 1037          Gait/Stairs (Locomotion)    [place] Level (Gait) minimum assist (75% patient effort);1 person assist;verbal cues;nonverbal cues (demo/gesture)  -ND     Assistive Device (Gait) walker, front-wheeled  -ND     Patient was able to Ambulate yes  -ND     Distance in Feet (Gait) 30  -ND     Deviations/Abnormal Patterns (Gait) bilateral deviations;carol decreased;base of support, wide;gait speed decreased;stride length decreased  -ND     Bilateral Gait Deviations forward flexed posture;heel strike decreased  -ND     Comment, (Gait/Stairs) Pt ambulates short distance with narrow REFUGIO, forward flexed kyphotic posture, and decreased stride length. Cues for upright posture and pt requires manual assist to advance  RWx this date. Overall distance limited by fatigue.  -ND               User Key  (r) = Recorded By, (t) = Taken By, (c) = Cosigned By      Initials Name Provider Type    Josephine Tan PT Physical Therapist                   Obj/Interventions       Row Name 01/29/25 1039          Balance    Balance Assessment sitting static balance;sitting dynamic balance;standing static balance;standing dynamic balance  -ND     Static Sitting Balance standby assist  -ND     Dynamic Sitting Balance standby assist  -ND     Position, Sitting Balance unsupported;sitting in chair  -ND     Static Standing Balance contact guard  -ND     Dynamic Standing Balance minimal assist  -ND     Position/Device Used, Standing Balance supported;walker, front-wheeled  -ND     Balance Interventions sitting;standing;sit to stand;supported;static;dynamic  -ND     Comment, Balance No over LOB or knee buckling noted.  -ND               User Key  (r) = Recorded By, (t) = Taken By, (c) = Cosigned By      Initials Name Provider Type    Josephine Tan PT Physical Therapist                   Goals/Plan    No documentation.                  Clinical Impression       Row Name 01/29/25 1040          Pain    Pretreatment Pain Rating 0/10 - no pain  -ND     Posttreatment Pain Rating 0/10 - no pain  -ND       Row Name 01/29/25 1040          Plan of Care Review    Plan of Care Reviewed With patient  -ND     Progress improving  -ND     Outcome Evaluation Pt demonstrated improvement in performance of functional mobility this date with min-A for ambulating short distance with RWx. Pt would benefit from continued IP PT services to further address strength, balance, and activity tolerance deficits to facilitate increased functional strength and mobility. Recommend SNF following d/c.  -ND       Row Name 01/29/25 1040          Vital Signs    O2 Delivery Pre Treatment room air  -ND     O2 Delivery Intra Treatment room air  -ND     O2 Delivery Post Treatment  room air  -ND     Pre Patient Position Sitting  -ND     Intra Patient Position Standing  -ND     Post Patient Position Sitting  -ND       Row Name 01/29/25 1040          Positioning and Restraints    Pre-Treatment Position sitting in chair/recliner  -ND     Post Treatment Position chair  -ND     In Chair notified nsg;reclined;legs elevated;call light within reach;encouraged to call for assist;exit alarm on;waffle cushion;on mechanical lift sling  -ND               User Key  (r) = Recorded By, (t) = Taken By, (c) = Cosigned By      Initials Name Provider Type    Josephine Tan, FELIZ Physical Therapist                   Outcome Measures       Row Name 01/29/25 1041          How much help from another person do you currently need...    Turning from your back to your side while in flat bed without using bedrails? 3  -ND     Moving from lying on back to sitting on the side of a flat bed without bedrails? 2  -ND     Moving to and from a bed to a chair (including a wheelchair)? 3  -ND     Standing up from a chair using your arms (e.g., wheelchair, bedside chair)? 3  -ND     Climbing 3-5 steps with a railing? 2  -ND     To walk in hospital room? 3  -ND     AM-PAC 6 Clicks Score (PT) 16  -ND     Highest Level of Mobility Goal 5 --> Static standing  -ND       Row Name 01/29/25 1041 01/29/25 0913       Functional Assessment    Outcome Measure Options AM-PAC 6 Clicks Basic Mobility (PT)  -ND AM-PAC 6 Clicks Daily Activity (OT)  -KF              User Key  (r) = Recorded By, (t) = Taken By, (c) = Cosigned By      Initials Name Provider Type    Michell Minaya, OT Occupational Therapist    Josephine Tan, FELIZ Physical Therapist                                 Physical Therapy Education       Title: PT OT SLP Therapies (In Progress)       Topic: Physical Therapy (In Progress)       Point: Mobility training (In Progress)       Learning Progress Summary            Patient Acceptance, E, NR by ND at 1/29/2025 1042     Acceptance, E, NR,NL by ZHANNA at 1/26/2025 1344                      Point: Home exercise program (Not Started)       Learner Progress:  Not documented in this visit.              Point: Body mechanics (In Progress)       Learning Progress Summary            Patient Acceptance, E, NR by ND at 1/29/2025 1042    Acceptance, E, NR,NL by ZHANNA at 1/26/2025 1344                      Point: Precautions (In Progress)       Learning Progress Summary            Patient Acceptance, E, NR by ND at 1/29/2025 1042    Acceptance, E, NR,NL by ZHANNA at 1/26/2025 1344                                      User Key       Initials Effective Dates Name Provider Type Discipline     06/16/21 -  Paige Villalobos, PT Physical Therapist PT    ND 11/16/23 -  Josephine Barnhart PT Physical Therapist PT                  PT Recommendation and Plan     Progress: improving  Outcome Evaluation: Pt demonstrated improvement in performance of functional mobility this date with min-A for ambulating short distance with RWx. Pt would benefit from continued IP PT services to further address strength, balance, and activity tolerance deficits to facilitate increased functional strength and mobility. Recommend SNF following d/c.     Time Calculation:         PT Charges       Row Name 01/29/25 1042             Time Calculation    Start Time 0827  -ND      PT Received On 01/29/25  -ND         Timed Charges    67200 - PT Therapeutic Activity Minutes 14  -ND         Total Minutes    Timed Charges Total Minutes 14  -ND       Total Minutes 14  -ND                User Key  (r) = Recorded By, (t) = Taken By, (c) = Cosigned By      Initials Name Provider Type    ND Josephine Barnhart, FELIZ Physical Therapist                  Therapy Charges for Today       Code Description Service Date Service Provider Modifiers Qty    05830478060  PT THERAPEUTIC ACT EA 15 MIN 1/29/2025 Josephine Barnhart, PT GP 1            PT G-Codes  Outcome Measure Options: AM-PAC 6 Clicks Basic  Mobility (PT)  AM-PAC 6 Clicks Score (PT): 16  AM-PAC 6 Clicks Score (OT): 16  PT Discharge Summary  Anticipated Discharge Disposition (PT): skilled nursing facility    Josephine Barnhart, PT  2025      Electronically signed by Josephine Barnhart, PT at 25 1043          Occupational Therapy Notes (most recent note)        Michell Richter, OT at 25 0823          Patient Name: Laureen Nettles  : 1932    MRN: [id number]                              Today's Date: 2025       Admit Date: 2025    Visit Dx:     ICD-10-CM ICD-9-CM   1. Urinary tract infection without hematuria, site unspecified  N39.0 599.0   2. Generalized weakness  R53.1 780.79   3. Renal insufficiency  N28.9 593.9   4. Dehydration  E86.0 276.51   5. Hypercalcemia  E83.52 275.42   6. COVID-19 virus infection  U07.1 079.89     Patient Active Problem List   Diagnosis    Hyperlipidemia    Hypothyroidism    Cardiac pacemaker in situ    Aortic valve regurgitation    HTN (hypertension)    Osteoporosis    Acquired cystic kidney disease    Acute systolic CHF (congestive heart failure)    UTI (urinary tract infection)    Dehydration     Past Medical History:   Diagnosis Date    Aortic valve regurgitation     B12 deficiency     COVID-19 2022    Falls     Heart failure     HLD (hyperlipidemia)     HTN (hypertension)     Hypothyroidism     Pacemaker      Past Surgical History:   Procedure Laterality Date    ANAL FISTULA REPAIR      TONSILLECTOMY        General Information       Row Name 25 0908          OT Time and Intention    Document Type therapy note (daily note)  -KF     Mode of Treatment occupational therapy  -KF       Row Name 25 0908          General Information    Patient Profile Reviewed yes  -KF     Existing Precautions/Restrictions fall;other (see comments)  very [place], brief with mobility  -KF     Barriers to Rehab medically complex;previous functional deficit;hearing deficit  -KF       Row Name 25 0995           Cognition    Orientation Status (Cognition) oriented to;person;place;disoriented to;time;other (see comments)  Pt stated Feb 2023.  -       Row Name 01/29/25 0908          Safety Issues/Impairments Affecting Functional Mobility    Safety Issues Affecting Function (Mobility) awareness of need for assistance;insight into deficits/self-awareness;positioning of assistive device;safety precaution awareness;safety precautions follow-through/compliance;sequencing abilities  -     Impairments Affecting Function (Mobility) balance;endurance/activity tolerance;postural/trunk control;strength  -               User Key  (r) = Recorded By, (t) = Taken By, (c) = Cosigned By      Initials Name Provider Type     Michell Richter OT Occupational Therapist                     Mobility/ADL's       Row Name 01/29/25 0908          Bed Mobility    Bed Mobility supine-sit  -     Supine-Sit [place] (Bed Mobility) minimum assist (75% patient effort);1 person assist;verbal cues  -     Assistive Device (Bed Mobility) bed rails;head of bed elevated  -     Comment, (Bed Mobility) Increased time needed for pt to scoot hips toward the EOB  -       Row Name 01/29/25 0908          Transfers    Transfers sit-stand transfer;stand-sit transfer;toilet transfer  -     Comment, (Transfers) Cues provided for hand placement  -       Row Name 01/29/25 0908          Sit-Stand Transfer    Sit-Stand [place] (Transfers) minimum assist (75% patient effort);1 person assist;verbal cues  -     Assistive Device (Sit-Stand Transfers) walker, front-wheeled  -     Comment, (Sit-Stand Transfer) STS x1 from the EOB, x1 from commode  -       Row Name 01/29/25 0908          Stand-Sit Transfer    Stand-Sit [place] (Transfers) minimum assist (75% patient effort);1 person assist;verbal cues  -     Assistive Device (Stand-Sit Transfers) walker, front-wheeled  -       Row Name 01/29/25 0908          Toilet Transfer    Type  (Toilet Transfer) sit-stand;stand-sit  -     [place] (Toilet Transfer) minimum assist (75% patient effort);1 person assist;verbal cues  -     Assistive Device (Toilet Transfer) walker, front-wheeled;commode;grab bars/safety frame  -       Row Name 01/29/25 0908          Functional Mobility    Functional Mobility- Ind. Level minimum assist (75% patient effort);1 person;verbal cues required  -     Functional Mobility- Device walker, front-wheeled  -     Functional Mobility-Distance (Feet) --  to the bathroom  -     Patient was able to Ambulate yes  -       Row Name 01/29/25 0908          Activities of Daily Living    BADL Assessment/Intervention lower body dressing;bathing;grooming;feeding;toileting  -[facility] Name 01/29/25 0908          Bathing Assessment/Intervention    [place] (Bathing) lower body;dependent (less than 25% patient effort)  -     Comment, (Bathing) Pt with episode of incontinence during ambulation to the bathroom. LB bathing performed dependently.  -       Row Name 01/29/25 0908          Lower Body Dressing Assessment/Training    [place] (Lower Body Dressing) doff;don;socks;dependent (less than 25% patient effort)  -     Position (Lower Body Dressing) unsupported sitting  -[facility] Name 01/29/25 0908          Grooming Assessment/Training    [place] (Grooming) oral care regimen;hair care, combing/brushing;contact guard assist  -     Position (Grooming) sink side;supported standing  -       Row Name 01/29/25 0908          Self-Feeding Assessment/Training    [place] (Feeding) prepare tray/open items;minimum assist (75% patient effort);liquids to mouth;scoop food and bring to mouth;independent  -     Position (Feeding) supported sitting  -       Row Name 01/29/25 0908          Toileting Assessment/Training    [place] (Toileting) adjust/manage clothing;perform perineal hygiene;standby assist  -      Position (Toileting) unsupported sitting  -KF               User Key  (r) = Recorded By, (t) = Taken By, (c) = Cosigned By      Initials Name Provider Type    Michell Minaya OT Occupational Therapist                   Obj/Interventions       Row Name 01/29/25 0910          Balance    Balance Assessment sitting static balance;sitting dynamic balance;sit to stand dynamic balance;standing static balance;standing dynamic balance  -KF     Static Sitting Balance standby assist  -KF     Dynamic Sitting Balance standby assist  -KF     Position, Sitting Balance unsupported;sitting edge of bed;other (see comments)  commode  -KF     Sit to Stand Dynamic Balance minimal assist;1-person assist;verbal cues  -KF     Static Standing Balance contact guard  -KF     Dynamic Standing Balance minimal assist;1-person assist  -KF     Position/Device Used, Standing Balance supported;walker, front-wheeled  -KF     Balance Interventions sitting;standing;sit to stand;supported;static;dynamic;occupation based/functional task  -KF               User Key  (r) = Recorded By, (t) = Taken By, (c) = Cosigned By      Initials Name Provider Type    Michell Minaya OT Occupational Therapist                   Goals/Plan    No documentation.                  Clinical Impression       Row Name 01/29/25 0911          Pain Assessment    Pretreatment Pain Rating 0/10 - no pain  -KF     Posttreatment Pain Rating 0/10 - no pain  -KF       Row Name 01/29/25 0911          Plan of Care Review    Plan of Care Reviewed With patient  -KF     Progress improving  -KF     Outcome Evaluation Pt with good participation and progress toward goals during OT session. Pt ambulated to/from the bathroom using a RWx with Cuco x1. Pt performed toileting with SBA and standing sink side grooming ADLs with CGA. Pt is quick to fatigue during activity however. The pt remains below baseline with generalized weakness, decreased activity tolerance, and balance deficits  warranting continued IP OT services. Continue to rec a d/c to SNF.  -KF       Row Name 01/29/25 0911          Therapy Assessment/Plan (OT)    Rehab Potential (OT) good  -KF     Criteria for Skilled Therapeutic Interventions Met (OT) yes;skilled treatment is necessary  -KF     Therapy Frequency (OT) daily  -KF       Row Name 01/29/25 0911          Therapy Plan Review/Discharge Plan (OT)    Anticipated Discharge Disposition (OT) [facility]  -       Row Name 01/29/25 0911          Vital Signs    Pre Systolic BP Rehab --  non-tele, RN cleared  -KF     Pre Patient Position Supine  -KF     Intra Patient Position Standing  -KF     Post Patient Position Standing  -KF       Row Name 01/29/25 0911          Positioning and Restraints    Pre-Treatment Position in bed  -KF     Post Treatment Position bathroom  -KF     Bathroom with PT  -KF               User Key  (r) = Recorded By, (t) = Taken By, (c) = Cosigned By      Initials Name Provider Type    Michell Minaya OT Occupational Therapist                   Outcome Measures       Row Name 01/29/25 0913          How much help from another is currently needed...    Putting on and taking off regular lower body clothing? 2  -KF     Bathing (including washing, rinsing, and drying) 2  -KF     Toileting (which includes using toilet bed pan or urinal) 3  -KF     Putting on and taking off regular upper body clothing 3  -KF     Taking care of personal grooming (such as brushing teeth) 3  -KF     Eating meals 3  -KF     AM-PAC 6 Clicks Score (OT) 16  -KF       Row Name 01/29/25 0913          Functional Assessment    Outcome Measure Options AM-PAC 6 Clicks Daily Activity (OT)  -KF               User Key  (r) = Recorded By, (t) = Taken By, (c) = Cosigned By      Initials Name Provider Type    Michell Minaya OT Occupational Therapist                    Occupational Therapy Education       Title: PT OT SLP Therapies (In Progress)       Topic: Occupational Therapy (In  Progress)       Point: ADL training (Done)       Description:   Instruct learner(s) on proper safety adaptation and remediation techniques during self care or transfers.   Instruct in proper use of assistive devices.                  Learning Progress Summary            Patient Acceptance, E,TB, VU,DU by  at 1/29/2025 0823    Acceptance, E, NR by  at 1/26/2025 1054                      Point: Home exercise program (Not Started)       Description:   Instruct learner(s) on appropriate technique for monitoring, assisting and/or progressing therapeutic exercises/activities.                  Learner Progress:  Not documented in this visit.              Point: Precautions (Done)       Description:   Instruct learner(s) on prescribed precautions during self-care and functional transfers.                  Learning Progress Summary            Patient Acceptance, E,TB, VU,DU by  at 1/29/2025 0823    Acceptance, E, NR by  at 1/26/2025 1054                      Point: Body mechanics (Done)       Description:   Instruct learner(s) on proper positioning and spine alignment during self-care, functional mobility activities and/or exercises.                  Learning Progress Summary            Patient Acceptance, E,TB, VU,DU by  at 1/29/2025 0823    Acceptance, E, NR by  at 1/26/2025 1054                                      User Key       Initials Effective Dates Name Provider Type Discipline     06/16/21 -  Domonique Delarosa OT Occupational Therapist OT     08/09/23 -  Michell Richter OT Occupational Therapist OT                  OT Recommendation and Plan  Therapy Frequency (OT): daily  Plan of Care Review  Plan of Care Reviewed With: patient  Progress: improving  Outcome Evaluation: Pt with good participation and progress toward goals during OT session. Pt ambulated to/from the bathroom using a RWx with Cuco x1. Pt performed toileting with SBA and standing sink side grooming ADLs with CGA. Pt is quick to fatigue  during activity however. The pt remains below baseline with generalized weakness, decreased activity tolerance, and balance deficits warranting continued IP OT services. Continue to rec a d/c to SNF.     Time Calculation:         Time Calculation- OT       Row Name 01/29/25 0913             Time Calculation- OT    OT Start Time 0823  -KF      OT Received On 01/29/25  -KF      OT Goal Re-Cert Due Date 02/05/25  -KF         Timed Charges    90457 - OT Therapeutic Activity Minutes 5  -KF      57634 - OT Self Care/Mgmt Minutes 10  -KF         Total Minutes    Timed Charges Total Minutes 15  -KF       Total Minutes 15  -KF                User Key  (r) = Recorded By, (t) = Taken By, (c) = Cosigned By      Initials Name Provider Type    KF Michell Richter OT Occupational Therapist                  Therapy Charges for Today       Code Description Service Date Service Provider Modifiers Qty    39296014846 HC OT SELF CARE/MGMT/TRAIN EA 15 MIN 1/29/2025 Michell Richter OT GO 1                 Michell Richter OT  1/29/2025    Electronically signed by Michell Richter OT at 01/29/25 0914

## 2025-01-31 NOTE — DISCHARGE PLACEMENT REQUEST
" [phone]    Rosa Osuna (89 y.o. Female)       Date of Birth   1932    Social Security Number       Address   [address] [place], [place] [address]    Home Phone   [phone]    MRN   [id number]       [facility]    Marital Status   Single                            Admission Date   25    Admission Type   Emergency    Admitting Provider   JOSUE Benson DO    Attending Provider   JOSUE Benson DO    Department, Room/Bed   [address], [address]/1       Discharge Date       Discharge Disposition       Discharge Destination                                 Attending Provider: JOSUE Benson DO    Allergies: Penicillins, Potassium-containing Compounds    Isolation: Contact Air   Infection: COVID (confirmed) (25)   Code Status: CPR    Ht: 152.4 cm (60\")   Wt: 39 kg (86 lb)    Admission Cmt: None   Principal Problem: UTI (urinary tract infection) [N39.0]                   Active Insurance as of 2025       Primary Coverage       Payor Plan Insurance Group Employer/Plan Group    HUMANA MEDICARE REPLACEMENT HUMANA MED ADV HMO [id number]       Payor Plan Address Payor Plan Phone Number Payor Plan Fax Number Effective Dates    [address] [address] [phone]  2025 - None Entered    [facility] [id number]         Subscriber Name Subscriber Birth Date Member ID       ROSA OSUNA 1932 [id number]                     Emergency Contacts        (Rel.) Home Phone Work Phone Mobile Phone    Arden Marie (Other) -- -- [phone]    Shara Marie (Relative) -- -- [phone]                 Physician Progress Notes (most recent note)        Vilma Castillo APRN at 25 0725              [facility] Medicine Services  PROGRESS NOTE    Patient Name: Rosa Osuna  : 1932  MRN: [id number]    Date of Admission: 2025  Primary Care Physician: Criselda Padgett MD    Subjective   Subjective "     CC:  F/u AMS    HPI:  Patient resting in bed.  Tells me she feels lethargic today but denies concerns otherwise.  Denies shortness of breath.  Is alert and oriented x 3.      Objective   Objective     Vital Signs:   Temp:  [98.2 °F (36.8 °C)-98.3 °F (36.8 °C)] 98.3 °F (36.8 °C)  Heart Rate:  [93-95] 95  Resp:  [18] 18  BP: ()/(50-69) 120/69     Physical Exam:  Constitutional: No acute distress, awake, alert  HENT: NCAT, mucous membranes moist  Respiratory: Clear to auscultation bilaterally, respiratory effort normal, room air  Cardiovascular: RRR, no murmurs, rubs, or gallops  Gastrointestinal: Positive bowel sounds, soft, nontender, nondistended  Musculoskeletal: No bilateral ankle edema  Psychiatric: Appropriate affect, cooperative  Neurologic: Oriented x 3, moves all extremities, speech clear  Skin: No rashes      Results Reviewed:  LAB RESULTS:      Lab 01/29/25  1017 01/28/25  0904 01/25/25  1349 01/24/25  1310 01/24/25  1153   WBC 12.38* 11.30* 12.98*  --  10.76   HEMOGLOBIN 13.7 13.4 12.1  --  15.1   HEMATOCRIT 43.6 42.5 37.6  --  46.0   PLATELETS 210 184 215  --  246   NEUTROS ABS 9.77* 8.44* 10.60*  --  8.45*   IMMATURE GRANS (ABS) 0.13* 0.13* 0.06*  --  0.05   LYMPHS ABS 1.59 1.72 1.42  --  1.45   MONOS ABS 0.64 0.76 0.83  --  0.72   EOS ABS 0.17 0.17 0.03  --  0.03   MCV 98.2* 97.7* 94.9  --  94.3   PROCALCITONIN  --   --   --  0.24  --    HSTROP T  --   --   --  35* 35*         Lab 01/29/25  1017 01/28/25  0855 01/26/25  0301 01/25/25  1349 01/24/25  1153   SODIUM 139 135* 141 141 139   POTASSIUM 4.3 4.2 3.5 3.9 4.8   CHLORIDE 106 107 106 104 94*   CO2 20.0* 18.0* 26.0 31.0* 25.0   ANION GAP 13.0 10.0 9.0 6.0 20.0*   BUN 29* 24* 45* 48* 60*   CREATININE 0.92 0.74 1.02* 1.56* 1.39*   EGFR 58.5* 76.0 51.7* 31.1* 35.7*   GLUCOSE 100* 94 110* 138* 106*   CALCIUM 9.0 8.8 9.5 11.0* 13.2*   IONIZED CALCIUM  --   --   --  1.37*  --    MAGNESIUM 2.4* 2.1  --  1.9 2.4*   PHOSPHORUS  --   --  4.2 1.5*  --     TSH  --   --   --  3.570  --          Lab 01/29/25  1017 01/28/25  0855 01/24/25  1153   TOTAL PROTEIN 6.2 5.6* 7.5   ALBUMIN 3.3* 2.6* 4.2   GLOBULIN 2.9 3.0 3.3   ALT (SGPT) 8 5 9   AST (SGOT) 19 19 17   BILIRUBIN 0.3 0.3 0.4   ALK PHOS 58 51 71         Lab 01/24/25  1310 01/24/25  1153   HSTROP T 35* 35*                 Brief Urine Lab Results  (Last result in the past 365 days)        Color   Clarity   Blood   Leuk Est   Nitrite   Protein   CREAT   Urine HCG        01/24/25 1222 Yellow   Turbid   Moderate (2+)   Large (3+)   Negative   30 mg/dL (1+)                   Microbiology Results Abnormal       Procedure Component Value - Date/Time    COVID PRE-OP / PRE-PROCEDURE SCREENING ORDER (NO ISOLATION) - Swab, Nasopharynx [[id number]]  (Abnormal) Collected: 01/24/25 1201    Lab Status: Final result Specimen: Swab from Nasopharynx Updated: 01/24/25 1256    Narrative:      The following orders were created for panel order COVID PRE-OP / PRE-PROCEDURE SCREENING ORDER (NO ISOLATION) - Swab, Nasopharynx.  Procedure                               Abnormality         Status                     ---------                               -----------         ------                     COVID-19 and FLU A/B PCR...[[id number]]  Abnormal            Final result                 Please view results for these tests on the individual orders.    COVID-19 and FLU A/B PCR, 1 HR TAT - Swab, Nasopharynx [[id number]]  (Abnormal) Collected: 01/24/25 1201    Lab Status: Final result Specimen: Swab from Nasopharynx Updated: 01/24/25 1256     COVID19 Detected     Influenza A PCR Not Detected     Influenza B PCR Not Detected    Narrative:      Fact sheet for providers: https://www.fda.gov/media/901472/download    Fact sheet for patients: https://www.fda.gov/media/765249/download    Test performed by PCR.  Influenza A and Influenza B negative results should be considered presumptive in samples that have a positive SARS-CoV-2  result.    Competitive inhibition studies showed that SARS-CoV-2 virus, when present at concentrations above 3.6E+04 copies/mL, can inhibit the detection and amplification of influenza A and influenza B virus RNA if present at or below 1.8E+02 copies/mL or 4.9E+02 copies/mL, respectively, and may lead to false negative influenza virus results. If co-infection with influenza A or influenza B virus is suspected in samples with a positive SARS-CoV-2 result, the sample should be re-tested with another FDA cleared, approved, or authorized influenza test, if influenza virus detection would change clinical management.            No radiology results from the last 24 hrs    Results for orders placed during the hospital encounter of 08/29/21    Adult Transthoracic Echo Complete W/ Cont if Necessary Per Protocol    Interpretation Summary  · Estimated left ventricular EF = 30-35%. Moderate to severe LV systolic dysfunction.  · Left ventricular wall thickness is consistent with mild concentric hypertrophy.  · Left ventricular diastolic function is consistent with (grade II w/high LAP) pseudonormalization.  · Severe aortic valve regurgitation is present.  · Moderate to severe mitral valve regurgitation is present.  · Mild tricuspid valve regurgitation is present  · Estimated right ventricular systolic pressure from tricuspid regurgitation is normal (<35 mmHg).      Current medications:  Scheduled Meds:apixaban, 2.5 mg, Oral, BID  [Held by provider] furosemide, 20 mg, Oral, Daily  levothyroxine, 88 mcg, Oral, Q AM  metoprolol succinate XL, 25 mg, Oral, Daily  sodium chloride, 10 mL, Intravenous, Q12H  [Held by provider] spironolactone, 25 mg, Oral, Daily      Continuous Infusions:   PRN Meds:.  acetaminophen    senna-docusate sodium **AND** polyethylene glycol **AND** bisacodyl **AND** bisacodyl    Calcium Replacement - Follow Nurse / BPA Driven Protocol    Magnesium Standard Dose Replacement - Follow Nurse / BPA Driven  Protocol    ondansetron    Phosphorus Replacement - Follow Nurse / BPA Driven Protocol    Polyvinyl Alcohol-Povidone PF    Potassium Replacement - Follow Nurse / BPA Driven Protocol    sodium chloride    sodium chloride    sodium chloride    Assessment & Plan   Assessment & Plan     Active Hospital Problems    Diagnosis  POA    **UTI (urinary tract infection) [N39.0]  Yes    Dehydration [E86.0]  Yes      Resolved Hospital Problems   No resolved problems to display.        Brief Hospital Course to date:  Laureen Nettles is a 89 y.o. female with history of hypothyroidism and paroxysmal A-fib who lives at [facility] and was sent for lethargy and mental status changes.  Labs significant for hypercalcemia and mild MEENAKSHI.       This patient's problems and plans were partially entered by my partner and updated as appropriate by me 01/30/25.      Lethargy, improved   Hypercalcemia  -Likely multifactorial with hypercalcemia and dehydration found on admission, recent COVID  -PTH, TSH wnl  -Calcium supplements stopped.  Hydrated.  Now eating.     -Pt was on diuretics until admission - would stop or make them PRN at DC.  -AM BMP, mag     COVID +  Possible L base infiltrate vs atelectasis  - COVID diagnosed on 1/21/25   - ? L basilar infiltrate. Infiltrate is likely from COVID and not bacterial or infectious. Patient also s/p cefazolin for suspected UTI, which would cover most organisms   - denies symptoms.Continues on RA as of 1/30  - WBCs 12.38, neutrophilia on 1/29, afebrile  - AM CBC w/diff    ? UTI, ruled out  -UA + but contaminated , treated empirically with cefazolin  -Ucx no growth, abx stopped 1/27, received 6 doses     MEENAKSHI - resolved   - baseline Cr 0.9, but on admission was 1.4; now 1.0   - spironolactone and Lasix held, BP normotensive     Dual chamber PPM   PAFib   History of CHF, EF 30% in 2021  - follows with Dr. Ascencio  - started on lasix/spironolactone for this in 2021  - hold diuretics and  perhaps stop permanently, no edema/evidence of volume overload on exam  - metoprolol for rate control, low EF   - Eliquis for anticoagulation     Weakness/debility  -PT, OT following  -Currently too weak to return to assisted-living, plan is SNF    Expected Discharge Location and Transportation: SNF  Expected Discharge pending bed offer, insurance approval  Expected Discharge Date: 2025; Expected Discharge Time:      VTE Prophylaxis:  Pharmacologic & mechanical VTE prophylaxis orders are present.         AM-PAC 6 Clicks Score (PT): 12 (25)    CODE STATUS:   Code Status and Medical Interventions: CPR (Attempt to Resuscitate); Full Support   Ordered at: 25 1345     Level Of Support Discussed With:    Patient     Code Status (Patient has no pulse and is not breathing):    CPR (Attempt to Resuscitate)     Medical Interventions (Patient has pulse or is breathing):    Full Support       NASREEN Ni  25        Electronically signed by Vilma Castillo APRN at 25 1325          Physical Therapy Notes (most recent note)        Josephine Barnhart, PT at 25 0827  Version 1 of 1         Patient Name: Laureen Nettles  : 1932    MRN: [id number]                              Today's Date: 2025       Admit Date: 2025    Visit Dx:     ICD-10-CM ICD-9-CM   1. Urinary tract infection without hematuria, site unspecified  N39.0 599.0   2. Generalized weakness  R53.1 780.79   3. Renal insufficiency  N28.9 593.9   4. Dehydration  E86.0 276.51   5. Hypercalcemia  E83.52 275.42   6. COVID-19 virus infection  U07.1 079.89     Patient Active Problem List   Diagnosis    Hyperlipidemia    Hypothyroidism    Cardiac pacemaker in situ    Aortic valve regurgitation    HTN (hypertension)    Osteoporosis    Acquired cystic kidney disease    Acute systolic CHF (congestive heart failure)    UTI (urinary tract infection)    Dehydration     Past Medical History:   Diagnosis Date     Aortic valve regurgitation     B12 deficiency     COVID-19 02/2022    Falls     Heart failure     HLD (hyperlipidemia)     HTN (hypertension)     Hypothyroidism     Pacemaker      Past Surgical History:   Procedure Laterality Date    ANAL FISTULA REPAIR      TONSILLECTOMY        General Information       Row Name 01/29/25 1035          Physical Therapy Time and Intention    Document Type therapy note (daily note)  -ND     Mode of Treatment physical therapy  -ND       Row Name 01/29/25 1035          General Information    Patient Profile Reviewed yes  -ND     Existing Precautions/Restrictions fall;other (see comments)  Severely [place]. Brief with mobility.  -ND     Barriers to Rehab previous functional deficit;hearing deficit  -ND       Row Name 01/29/25 1035          Cognition    Orientation Status (Cognition) oriented to;person;place;disoriented to;time;other (see comments)  -ND       Row Name 01/29/25 1035          Safety Issues/Impairments Affecting Functional Mobility    Safety Issues Affecting Function (Mobility) awareness of need for assistance;insight into deficits/self-awareness;positioning of assistive device;safety precautions follow-through/compliance;sequencing abilities;safety precaution awareness  -ND     Impairments Affecting Function (Mobility) balance;endurance/activity tolerance;strength;postural/trunk control  -ND               User Key  (r) = Recorded By, (t) = Taken By, (c) = Cosigned By      Initials Name Provider Type    ND Josephine Barnhart, PT Physical Therapist                   Mobility       Row Name 01/29/25 1037          Bed Mobility    Comment, (Bed Mobility) Pt sitting UIC with OT upon PT arrival.  -ND       Row Name 01/29/25 1037          Sit-Stand Transfer    Sit-Stand [place] (Transfers) contact guard;verbal cues;nonverbal cues (demo/gesture)  -ND     Assistive Device (Sit-Stand Transfers) walker, front-wheeled  -ND     Comment, (Sit-Stand Transfer) from chair, cues for hand  placement.  -ND       Row Name 01/29/25 1037          Gait/Stairs (Locomotion)    [place] Level (Gait) minimum assist (75% patient effort);1 person assist;verbal cues;nonverbal cues (demo/gesture)  -ND     Assistive Device (Gait) walker, front-wheeled  -ND     Patient was able to Ambulate yes  -ND     Distance in Feet (Gait) 30  -ND     Deviations/Abnormal Patterns (Gait) bilateral deviations;carol decreased;base of support, wide;gait speed decreased;stride length decreased  -ND     Bilateral Gait Deviations forward flexed posture;heel strike decreased  -ND     Comment, (Gait/Stairs) Pt ambulates short distance with narrow REFUGIO, forward flexed kyphotic posture, and decreased stride length. Cues for upright posture and pt requires manual assist to advance RWx this date. Overall distance limited by fatigue.  -ND               User Key  (r) = Recorded By, (t) = Taken By, (c) = Cosigned By      Initials Name Provider Type    Josephine Tan PT Physical Therapist                   Obj/Interventions       Row Name 01/29/25 1039          Balance    Balance Assessment sitting static balance;sitting dynamic balance;standing static balance;standing dynamic balance  -ND     Static Sitting Balance standby assist  -ND     Dynamic Sitting Balance standby assist  -ND     Position, Sitting Balance unsupported;sitting in chair  -ND     Static Standing Balance contact guard  -ND     Dynamic Standing Balance minimal assist  -ND     Position/Device Used, Standing Balance supported;walker, front-wheeled  -ND     Balance Interventions sitting;standing;sit to stand;supported;static;dynamic  -ND     Comment, Balance No over LOB or knee buckling noted.  -ND               User Key  (r) = Recorded By, (t) = Taken By, (c) = Cosigned By      Initials Name Provider Type    Josephine Tan PT Physical Therapist                   Goals/Plan    No documentation.                  Clinical Impression       Row Name 01/29/25 1040           Pain    Pretreatment Pain Rating 0/10 - no pain  -ND     Posttreatment Pain Rating 0/10 - no pain  -ND       Row Name 01/29/25 1040          Plan of Care Review    Plan of Care Reviewed With patient  -ND     Progress improving  -ND     Outcome Evaluation Pt demonstrated improvement in performance of functional mobility this date with min-A for ambulating short distance with RWx. Pt would benefit from continued IP PT services to further address strength, balance, and activity tolerance deficits to facilitate increased functional strength and mobility. Recommend SNF following d/c.  -ND       Row Name 01/29/25 1040          Vital Signs    O2 Delivery Pre Treatment room air  -ND     O2 Delivery Intra Treatment room air  -ND     O2 Delivery Post Treatment room air  -ND     Pre Patient Position Sitting  -ND     Intra Patient Position Standing  -ND     Post Patient Position Sitting  -ND       Row Name 01/29/25 1040          Positioning and Restraints    Pre-Treatment Position sitting in chair/recliner  -ND     Post Treatment Position chair  -ND     In Chair notified nsg;reclined;legs elevated;call light within reach;encouraged to call for assist;exit alarm on;waffle cushion;on mechanical lift sling  -ND               User Key  (r) = Recorded By, (t) = Taken By, (c) = Cosigned By      Initials Name Provider Type    Josephine Tan, PT Physical Therapist                   Outcome Measures       Row Name 01/29/25 1041          How much help from another person do you currently need...    Turning from your back to your side while in flat bed without using bedrails? 3  -ND     Moving from lying on back to sitting on the side of a flat bed without bedrails? 2  -ND     Moving to and from a bed to a chair (including a wheelchair)? 3  -ND     Standing up from a chair using your arms (e.g., wheelchair, bedside chair)? 3  -ND     Climbing 3-5 steps with a railing? 2  -ND     To walk in hospital room? 3  -ND     AM-PAC  6 Clicks Score (PT) 16  -ND     Highest Level of Mobility Goal 5 --> Static standing  -ND       Row Name 01/29/25 1041 01/29/25 0913       Functional Assessment    Outcome Measure Options AM-PAC 6 Clicks Basic Mobility (PT)  -ND AM-PAC 6 Clicks Daily Activity (OT)  -KF              User Key  (r) = Recorded By, (t) = Taken By, (c) = Cosigned By      Initials Name Provider Type    KF Michell Richter, OT Occupational Therapist    ND Josephine Barnhart, PT Physical Therapist                                 Physical Therapy Education       Title: PT OT SLP Therapies (In Progress)       Topic: Physical Therapy (In Progress)       Point: Mobility training (In Progress)       Learning Progress Summary            Patient Acceptance, E, NR by ND at 1/29/2025 1042    Acceptance, E, NR,NL by  at 1/26/2025 1344                      Point: Home exercise program (Not Started)       Learner Progress:  Not documented in this visit.              Point: Body mechanics (In Progress)       Learning Progress Summary            Patient Acceptance, E, NR by ND at 1/29/2025 1042    Acceptance, E, NR,NL by ZHANNA at 1/26/2025 1344                      Point: Precautions (In Progress)       Learning Progress Summary            Patient Acceptance, E, NR by ND at 1/29/2025 1042    Acceptance, E, NR,NL by  at 1/26/2025 1344                                      User Key       Initials Effective Dates Name Provider Type Discipline    ZHANNA 06/16/21 -  Paige Villalobos, PT Physical Therapist PT    ND 11/16/23 -  Josephine Barnhart, FELIZ Physical Therapist PT                  PT Recommendation and Plan     Progress: improving  Outcome Evaluation: Pt demonstrated improvement in performance of functional mobility this date with min-A for ambulating short distance with RWx. Pt would benefit from continued IP PT services to further address strength, balance, and activity tolerance deficits to facilitate increased functional strength and mobility. Recommend  SNF following d/c.     Time Calculation:         PT Charges       Row Name 25 1042             Time Calculation    Start Time 08  -ND      PT Received On 25  -ND         Timed Charges    89164 - PT Therapeutic Activity Minutes 14  -ND         Total Minutes    Timed Charges Total Minutes 14  -ND       Total Minutes                 User Key  (r) = Recorded By, (t) = Taken By, (c) = Cosigned By      Initials Name Provider Type    ND Josephine Barnhart, PT Physical Therapist                  Therapy Charges for Today       Code Description Service Date Service Provider Modifiers Qty    42865570076 HC PT THERAPEUTIC ACT EA 15 MIN 2025 Josephine Barnhart, PT GP 1            PT G-Codes  Outcome Measure Options: AM-PAC 6 Clicks Basic Mobility (PT)  AM-PAC 6 Clicks Score (PT): 16  AM-PAC 6 Clicks Score (OT): 16  PT Discharge Summary  Anticipated Discharge Disposition (PT): skilled nursing facility    Josephine Barnhart PT  2025      Electronically signed by Josephine Barnhart PT at 25 1043          Occupational Therapy Notes (most recent note)        Michell Richter, OT at 25 0823          Patient Name: Laureen Nettles  : 1932    MRN: [id number]                              Today's Date: 2025       Admit Date: 2025    Visit Dx:     ICD-10-CM ICD-9-CM   1. Urinary tract infection without hematuria, site unspecified  N39.0 599.0   2. Generalized weakness  R53.1 780.79   3. Renal insufficiency  N28.9 593.9   4. Dehydration  E86.0 276.51   5. Hypercalcemia  E83.52 275.42   6. COVID-19 virus infection  U07.1 079.89     Patient Active Problem List   Diagnosis    Hyperlipidemia    Hypothyroidism    Cardiac pacemaker in situ    Aortic valve regurgitation    HTN (hypertension)    Osteoporosis    Acquired cystic kidney disease    Acute systolic CHF (congestive heart failure)    UTI (urinary tract infection)    Dehydration     Past Medical History:   Diagnosis Date    Aortic  valve regurgitation     B12 deficiency     COVID-19 02/2022    Falls     Heart failure     HLD (hyperlipidemia)     HTN (hypertension)     Hypothyroidism     Pacemaker      Past Surgical History:   Procedure Laterality Date    ANAL FISTULA REPAIR      TONSILLECTOMY        General Information       Row Name 01/29/25 0908          OT Time and Intention    Document Type therapy note (daily note)  -KF     Mode of Treatment occupational therapy  -       Row Name 01/29/25 0908          General Information    Patient Profile Reviewed yes  -KF     Existing Precautions/Restrictions fall;other (see comments)  very [place], brief with mobility  -KF     Barriers to Rehab medically complex;previous functional deficit;hearing deficit  -       Row Name 01/29/25 0908          Cognition    Orientation Status (Cognition) oriented to;person;place;disoriented to;time;other (see comments)  Pt stated Feb 2023.  -       Row Name 01/29/25 0908          Safety Issues/Impairments Affecting Functional Mobility    Safety Issues Affecting Function (Mobility) awareness of need for assistance;insight into deficits/self-awareness;positioning of assistive device;safety precaution awareness;safety precautions follow-through/compliance;sequencing abilities  -KF     Impairments Affecting Function (Mobility) balance;endurance/activity tolerance;postural/trunk control;strength  -KF               User Key  (r) = Recorded By, (t) = Taken By, (c) = Cosigned By      Initials Name Provider Type    KF Michell Richter OT Occupational Therapist                     Mobility/ADL's       Row Name 01/29/25 0908          Bed Mobility    Bed Mobility supine-sit  -KF     Supine-Sit [place] (Bed Mobility) minimum assist (75% patient effort);1 person assist;verbal cues  -KF     Assistive Device (Bed Mobility) bed rails;head of bed elevated  -KF     Comment, (Bed Mobility) Increased time needed for pt to scoot hips toward the EOB  -       Row Name 01/29/25 0908           Transfers    Transfers sit-stand transfer;stand-sit transfer;toilet transfer  -     Comment, (Transfers) Cues provided for hand placement  -       Row Name 01/29/25 0908          Sit-Stand Transfer    Sit-Stand [place] (Transfers) minimum assist (75% patient effort);1 person assist;verbal cues  -     Assistive Device (Sit-Stand Transfers) walker, front-wheeled  -     Comment, (Sit-Stand Transfer) STS x1 from the EOB, x1 from commode  -       Row Name 01/29/25 0908          Stand-Sit Transfer    Stand-Sit [place] (Transfers) minimum assist (75% patient effort);1 person assist;verbal cues  -     Assistive Device (Stand-Sit Transfers) walker, front-wheeled  -       Row Name 01/29/25 0908          Toilet Transfer    Type (Toilet Transfer) sit-stand;stand-sit  -     [place] Level (Toilet Transfer) minimum assist (75% patient effort);1 person assist;verbal cues  -     Assistive Device (Toilet Transfer) walker, front-wheeled;commode;grab bars/safety frame  -       Row Name 01/29/25 0908          Functional Mobility    Functional Mobility- Ind. Level minimum assist (75% patient effort);1 person;verbal cues required  -     Functional Mobility- Device walker, front-wheeled  -     Functional Mobility-Distance (Feet) --  to the bathroom  -     Patient was able to Ambulate yes  -       Row Name 01/29/25 0908          Activities of Daily Living    BADL Assessment/Intervention lower body dressing;bathing;grooming;feeding;toileting  -[facility] Name 01/29/25 0908          Bathing Assessment/Intervention    [place] Level (Bathing) lower body;dependent (less than 25% patient effort)  -     Comment, (Bathing) Pt with episode of incontinence during ambulation to the bathroom. LB bathing performed dependently.  -       Row Name 01/29/25 0908          Lower Body Dressing Assessment/Training    [place] Level (Lower Body Dressing) doff;don;socks;dependent (less than 25%  patient effort)  -KF     Position (Lower Body Dressing) unsupported sitting  -KF       Row Name 01/29/25 0908          Grooming Assessment/Training    [place] Level (Grooming) oral care regimen;hair care, combing/brushing;contact guard assist  -KF     Position (Grooming) sink side;supported standing  -KF       Row Name 01/29/25 0908          Self-Feeding Assessment/Training    [place] Level (Feeding) prepare tray/open items;minimum assist (75% patient effort);liquids to mouth;scoop food and bring to mouth;independent  -KF     Position (Feeding) supported sitting  -KF       Row Name 01/29/25 0908          Toileting Assessment/Training    [place] Level (Toileting) adjust/manage clothing;perform perineal hygiene;standby assist  -KF     Position (Toileting) unsupported sitting  -KF               User Key  (r) = Recorded By, (t) = Taken By, (c) = Cosigned By      Initials Name Provider Type    KF Michell Richter OT Occupational Therapist                   Obj/Interventions       Row Name 01/29/25 0910          Balance    Balance Assessment sitting static balance;sitting dynamic balance;sit to stand dynamic balance;standing static balance;standing dynamic balance  -KF     Static Sitting Balance standby assist  -KF     Dynamic Sitting Balance standby assist  -KF     Position, Sitting Balance unsupported;sitting edge of bed;other (see comments)  commode  -KF     Sit to Stand Dynamic Balance minimal assist;1-person assist;verbal cues  -KF     Static Standing Balance contact guard  -KF     Dynamic Standing Balance minimal assist;1-person assist  -KF     Position/Device Used, Standing Balance supported;walker, front-wheeled  -KF     Balance Interventions sitting;standing;sit to stand;supported;static;dynamic;occupation based/functional task  -KF               User Key  (r) = Recorded By, (t) = Taken By, (c) = Cosigned By      Initials Name Provider Type    KF Michell Richter OT Occupational Therapist                    Goals/Plan    No documentation.                  Clinical Impression       Row Name 01/29/25 0911          Pain Assessment    Pretreatment Pain Rating 0/10 - no pain  -KF     Posttreatment Pain Rating 0/10 - no pain  -KF       Row Name 01/29/25 0911          Plan of Care Review    Plan of Care Reviewed With patient  -KF     Progress improving  -KF     Outcome Evaluation Pt with good participation and progress toward goals during OT session. Pt ambulated to/from the bathroom using a RWx with Cuco x1. Pt performed toileting with SBA and standing sink side grooming ADLs with CGA. Pt is quick to fatigue during activity however. The pt remains below baseline with generalized weakness, decreased activity tolerance, and balance deficits warranting continued IP OT services. Continue to rec a d/c to SNF.  -       Row Name 01/29/25 0911          Therapy Assessment/Plan (OT)    Rehab Potential (OT) good  -KF     Criteria for Skilled Therapeutic Interventions Met (OT) yes;skilled treatment is necessary  -KF     Therapy Frequency (OT) daily  -KF       Row Name 01/29/25 0911          Therapy Plan Review/Discharge Plan (OT)    Anticipated Discharge Disposition (OT) skilled nursing facility  -       Row Name 01/29/25 0911          Vital Signs    Pre Systolic BP Rehab --  non-tele, RN cleared  -KF     Pre Patient Position Supine  -KF     Intra Patient Position Standing  -KF     Post Patient Position Standing  -KF       Row Name 01/29/25 0911          Positioning and Restraints    Pre-Treatment Position in bed  -KF     Post Treatment Position bathroom  -KF     Bathroom with PT  -KF               User Key  (r) = Recorded By, (t) = Taken By, (c) = Cosigned By      Initials Name Provider Type    Michell Minaya, ROJAS Occupational Therapist                   Outcome Measures       Row Name 01/29/25 0913          How much help from another is currently needed...    Putting on and taking off regular lower body clothing? 2   -KF     Bathing (including washing, rinsing, and drying) 2  -KF     Toileting (which includes using toilet bed pan or urinal) 3  -KF     Putting on and taking off regular upper body clothing 3  -KF     Taking care of personal grooming (such as brushing teeth) 3  -KF     Eating meals 3  -KF     AM-PAC 6 Clicks Score (OT) 16  -KF       Row Name 01/29/25 0913          Functional Assessment    Outcome Measure Options AM-PAC 6 Clicks Daily Activity (OT)  -KF               User Key  (r) = Recorded By, (t) = Taken By, (c) = Cosigned By      Initials Name Provider Type    KF Michell Richter OT Occupational Therapist                    Occupational Therapy Education       Title: PT OT SLP Therapies (In Progress)       Topic: Occupational Therapy (In Progress)       Point: ADL training (Done)       Description:   Instruct learner(s) on proper safety adaptation and remediation techniques during self care or transfers.   Instruct in proper use of assistive devices.                  Learning Progress Summary            Patient Acceptance, E,TB, VU,DU by  at 1/29/2025 0823    Acceptance, E, NR by SAMANTHA at 1/26/2025 1054                      Point: Home exercise program (Not Started)       Description:   Instruct learner(s) on appropriate technique for monitoring, assisting and/or progressing therapeutic exercises/activities.                  Learner Progress:  Not documented in this visit.              Point: Precautions (Done)       Description:   Instruct learner(s) on prescribed precautions during self-care and functional transfers.                  Learning Progress Summary            Patient Acceptance, E,TB, VU,DU by KF at 1/29/2025 0823    Acceptance, E, NR by SAMANTHA at 1/26/2025 1054                      Point: Body mechanics (Done)       Description:   Instruct learner(s) on proper positioning and spine alignment during self-care, functional mobility activities and/or exercises.                  Learning Progress Summary             Patient Acceptance, E,TB, VU,DU by  at 1/29/2025 0823    Acceptance, E, NR by  at 1/26/2025 1054                                      User Key       Initials Effective Dates Name Provider Type Discipline     06/16/21 -  Domonique Delarosa OT Occupational Therapist OT     08/09/23 -  Michell Richter OT Occupational Therapist OT                  OT Recommendation and Plan  Therapy Frequency (OT): daily  Plan of Care Review  Plan of Care Reviewed With: patient  Progress: improving  Outcome Evaluation: Pt with good participation and progress toward goals during OT session. Pt ambulated to/from the bathroom using a RWx with Cuco x1. Pt performed toileting with SBA and standing sink side grooming ADLs with CGA. Pt is quick to fatigue during activity however. The pt remains below baseline with generalized weakness, decreased activity tolerance, and balance deficits warranting continued IP OT services. Continue to rec a d/c to SNF.     Time Calculation:         Time Calculation- OT       Row Name 01/29/25 0913             Time Calculation- OT    OT Start Time 0823  -KF      OT Received On 01/29/25  -KF      OT Goal Re-Cert Due Date 02/05/25  -KF         Timed Charges    11116 - OT Therapeutic Activity Minutes 5  -KF      58010 - OT Self Care/Mgmt Minutes 10  -KF         Total Minutes    Timed Charges Total Minutes 15  -KF       Total Minutes 15  -KF                User Key  (r) = Recorded By, (t) = Taken By, (c) = Cosigned By      Initials Name Provider Type    Michell Minaya OT Occupational Therapist                  Therapy Charges for Today       Code Description Service Date Service Provider Modifiers Qty    [id number] HC OT SELF CARE/MGMT/TRAIN EA 15 MIN 1/29/2025 Michell Richter OT GO 1                 Michell Richter OT  1/29/2025    Electronically signed by Michell Richter OT at 01/29/25 0914

## 2025-01-31 NOTE — PROGRESS NOTES
[facility] Medicine Services  PROGRESS NOTE    Patient Name: Laureen Nettles  : 1932  MRN: [id number]    Date of Admission: 2025  Primary Care Physician: Criselda Padgett MD    Subjective   Subjective     CC:  F/u AMS    HPI:  Patient resting in bed.  Says she still has some cough.  Says she feels little lackadaisical.  Says her appetite is okay and she likes food at [place] so she will eat well.  She feels like she needs to go to the bathroom.  Discussed getting up to the chair today.  Anticipate discharge on Monday to [facility].    Objective   Objective     Vital Signs:   Temp:  [97.5 °F (36.4 °C)-97.8 °F (36.6 °C)] 97.5 °F (36.4 °C)  Heart Rate:  [88-90] 88  Resp:  [17-18] 17  BP: (110-129)/(52-57) 129/57     Physical Exam:  Constitutional: No acute distress, awake, alert  HENT: NCAT, mucous membranes moist  Respiratory: Clear to auscultation bilaterally, respiratory effort normal, room air  Cardiovascular: RRR, no murmurs, rubs, or gallops  Gastrointestinal: Positive bowel sounds, soft, nontender, nondistended  Musculoskeletal: No bilateral ankle edema  Psychiatric: Appropriate affect, cooperative  Neurologic: Oriented x 3, moves all extremities, speech clear  Skin: No rashes      Results Reviewed:  LAB RESULTS:      Lab 25  0702 25  1343 25  1017 25  0904 25  1349 25  1310   WBC 11.41* 12.07* 12.38* 11.30* 12.98*  --    HEMOGLOBIN 12.4 11.3* 13.7 13.4 12.1  --    HEMATOCRIT 38.3 35.4 43.6 42.5 37.6  --    PLATELETS 223 214 210 184 215  --    NEUTROS ABS 8.61* 9.30* 9.77* 8.44* 10.60*  --    IMMATURE GRANS (ABS) 0.15* 0.20* 0.13* 0.13* 0.06*  --    LYMPHS ABS 1.58 1.52 1.59 1.72 1.42  --    MONOS ABS 0.77 0.73 0.64 0.76 0.83  --    EOS ABS 0.21 0.24 0.17 0.17 0.03  --    MCV 95.3 96.7 98.2* 97.7* 94.9  --    PROCALCITONIN  --   --   --   --   --  0.24   HSTROP T  --   --   --   --   --  35*         [facility] 25  0702  01/30/25  1343 01/29/25  1017 01/28/25  0855 01/26/25  0301 01/25/25  1349   SODIUM 138 136 139 135* 141 141   POTASSIUM 4.0 4.2 4.3 4.2 3.5 3.9   CHLORIDE 107 106 106 107 106 104   CO2 22.0 22.0 20.0* 18.0* 26.0 31.0*   ANION GAP 9.0 8.0 13.0 10.0 9.0 6.0   BUN 30* 28* 29* 24* 45* 48*   CREATININE 0.80 1.03* 0.92 0.74 1.02* 1.56*   EGFR 69.2 51.1* 58.5* 76.0 51.7* 31.1*   GLUCOSE 90 205* 100* 94 110* 138*   CALCIUM 9.0 8.6 9.0 8.8 9.5 11.0*   IONIZED CALCIUM  --   --   --   --   --  1.37*   MAGNESIUM 2.2 2.1 2.4* 2.1  --  1.9   PHOSPHORUS  --   --   --   --  4.2 1.5*   TSH  --   --   --   --   --  3.570         Lab 01/30/25  1343 01/29/25  1017 01/28/25  0855   TOTAL PROTEIN 5.4* 6.2 5.6*   ALBUMIN 3.0* 3.3* 2.6*   GLOBULIN 2.4 2.9 3.0   ALT (SGPT) 11 8 5   AST (SGOT) 22 19 19   BILIRUBIN 0.3 0.3 0.3   ALK PHOS 57 58 51         Lab 01/24/25  1310   HSTROP T 35*                 Brief Urine Lab Results  (Last result in the past 365 days)        Color   Clarity   Blood   Leuk Est   Nitrite   Protein   CREAT   Urine HCG        01/24/25 1222 Yellow   Turbid   Moderate (2+)   Large (3+)   Negative   30 mg/dL (1+)                   Microbiology Results Abnormal       Procedure Component Value - Date/Time    COVID PRE-OP / PRE-PROCEDURE SCREENING ORDER (NO ISOLATION) - Swab, Nasopharynx [[id number]]  (Abnormal) Collected: 01/24/25 1201    Lab Status: Final result Specimen: Swab from Nasopharynx Updated: 01/24/25 1256    Narrative:      The following orders were created for panel order COVID PRE-OP / PRE-PROCEDURE SCREENING ORDER (NO ISOLATION) - Swab, Nasopharynx.  Procedure                               Abnormality         Status                     ---------                               -----------         ------                     COVID-19 and FLU A/B PCR...[[id number]]  Abnormal            Final result                 Please view results for these tests on the individual orders.    COVID-19 and FLU A/B PCR, 1 HR TAT -  Swab, Nasopharynx [[id number]]  (Abnormal) Collected: 01/24/25 1201    Lab Status: Final result Specimen: Swab from Nasopharynx Updated: 01/24/25 1256     COVID19 Detected     Influenza A PCR Not Detected     Influenza B PCR Not Detected    Narrative:      Fact sheet for providers: https://www.fda.gov/media/294155/download    Fact sheet for patients: https://www.fda.gov/media/666302/download    Test performed by PCR.  Influenza A and Influenza B negative results should be considered presumptive in samples that have a positive SARS-CoV-2 result.    Competitive inhibition studies showed that SARS-CoV-2 virus, when present at concentrations above 3.6E+04 copies/mL, can inhibit the detection and amplification of influenza A and influenza B virus RNA if present at or below 1.8E+02 copies/mL or 4.9E+02 copies/mL, respectively, and may lead to false negative influenza virus results. If co-infection with influenza A or influenza B virus is suspected in samples with a positive SARS-CoV-2 result, the sample should be re-tested with another FDA cleared, approved, or authorized influenza test, if influenza virus detection would change clinical management.            No radiology results from the last 24 hrs    Results for orders placed during the hospital encounter of 08/29/21    Adult Transthoracic Echo Complete W/ Cont if Necessary Per Protocol    Interpretation Summary  · Estimated left ventricular EF = 30-35%. Moderate to severe LV systolic dysfunction.  · Left ventricular wall thickness is consistent with mild concentric hypertrophy.  · Left ventricular diastolic function is consistent with (grade II w/high LAP) pseudonormalization.  · Severe aortic valve regurgitation is present.  · Moderate to severe mitral valve regurgitation is present.  · Mild tricuspid valve regurgitation is present  · Estimated right ventricular systolic pressure from tricuspid regurgitation is normal (<35 mmHg).      Current medications:  Scheduled  Meds:apixaban, 2.5 mg, Oral, BID  [Held by provider] furosemide, 20 mg, Oral, Daily  levothyroxine, 88 mcg, Oral, Q AM  metoprolol succinate XL, 25 mg, Oral, Daily  senna-docusate sodium, 2 tablet, Oral, BID  sodium chloride, 10 mL, Intravenous, Q12H  [Held by provider] spironolactone, 25 mg, Oral, Daily      Continuous Infusions:   PRN Meds:.  acetaminophen    senna-docusate sodium **AND** polyethylene glycol **AND** bisacodyl **AND** bisacodyl    Calcium Replacement - Follow Nurse / BPA Driven Protocol    Magnesium Standard Dose Replacement - Follow Nurse / BPA Driven Protocol    ondansetron    Phosphorus Replacement - Follow Nurse / BPA Driven Protocol    Polyvinyl Alcohol-Povidone PF    Potassium Replacement - Follow Nurse / BPA Driven Protocol    sodium chloride    sodium chloride    sodium chloride    Assessment & Plan   Assessment & Plan     Active Hospital Problems    Diagnosis  POA    **UTI (urinary tract infection) [N39.0]  Yes    Dehydration [E86.0]  Yes      Resolved Hospital Problems   No resolved problems to display.        Brief Hospital Course to date:  Laureen Nettles is a 89 y.o. female with history of hypothyroidism and paroxysmal A-fib who lives at [facility] and was sent for lethargy and mental status changes.  Labs significant for hypercalcemia and mild MEENAKSHI.       This patient's problems and plans were partially entered by my partner and updated as appropriate by me 01/31/25.      Lethargy, improved   Hypercalcemia  -Likely multifactorial with hypercalcemia and dehydration found on admission, recent COVID  -PTH, TSH wnl  -Calcium supplements stopped.  Hydrated.  Now eating.     -Pt was on diuretics until admission - would stop or make them PRN at [facility].     COVID +  Possible L base infiltrate vs atelectasis  - COVID diagnosed on 1/21/25   - ? L basilar infiltrate. Infiltrate is likely from COVID and not bacterial or infectious. Patient also s/p cefazolin for suspected UTI, which  would cover most organisms   - denies symptoms  - WBCs 11.41, white count stable, pt afebrile, remains on room air    ? UTI, ruled out  -UA + but contaminated , treated empirically with cefazolin  -Ucx no growth, abx stopped 1/27, received 6 doses     MEENAKHSI - resolved   - baseline Cr 0.9, but on admission was 1.4; now 1.0   - spironolactone and Lasix held, BP normotensive     Dual chamber PPM   PAFib   History of CHF, EF 30% in 2021  - follows with Dr. Ascencio  - started on lasix/spironolactone for this in 2021  - hold diuretics and perhaps stop permanently, no edema/evidence of volume overload on exam  - metoprolol for rate control, low EF   - Eliquis for anticoagulation     Weakness/debility  -PT, OT following  -Currently too weak to return to assisted-living, plan is SNF  -Up to chair    Expected Discharge Location and Transportation: [facility]  Expected Discharge pending insurance approval  Expected Discharge Date: 2/3/2025; Expected Discharge Time: 12:00 PM     VTE Prophylaxis:  Pharmacologic & mechanical VTE prophylaxis orders are present.         AM-PAC 6 Clicks Score (PT): 12 (01/31/25 0800)    CODE STATUS:   Code Status and Medical Interventions: CPR (Attempt to Resuscitate); Full Support   Ordered at: 01/24/25 1345     Level Of Support Discussed With:    Patient     Code Status (Patient has no pulse and is not breathing):    CPR (Attempt to Resuscitate)     Medical Interventions (Patient has pulse or is breathing):    Full Support       Vilma Castillo, APRN  01/31/25

## 2025-01-31 NOTE — CASE MANAGEMENT/SOCIAL WORK
Continued Stay Note  [facility]     Patient Name: Laureen Nettles  MRN: [id number]  Today's Date: 1/31/2025    Admit Date: 1/24/2025    Plan: SNF at [facility]   Discharge Plan       Row Name 01/31/25 1025       Plan    Plan SNF at [facility]    Plan Comments I received a call from Alesha at [facility]. She has offered the patient a bed. I have requested that our team start the pre cert which has been done. Patient's 10 day covid quarantine will be up on Monday and the facility can admit her on Monday. I have attempted to call her POA Arden to see if he can transport the patient to [facility]. He did not answer. I left a voicemail for him to call me back. I spoke with the patient at the bedside. She is agreeable to go to [facility] for rehab. Will need IMM on Monday. Submitted for EMS midday on Monday to accommodate for insurance. EMS time has been arranged for 1230 Monday February 3. CM following    1052: Aredn returned my call. I updated him on the plan for Monday pending insurance. Will update him on Monday when more information is available.    Final Discharge Disposition Code 03 - skilled nursing facility (SNF)                   Discharge Codes    No documentation.                 Expected Discharge Date and Time       Expected Discharge Date Expected Discharge Time    Feb 3, 2025               Christine Bennett RN

## 2025-01-31 NOTE — CASE MANAGEMENT/SOCIAL WORK
Continued Stay Note  [facility]     Patient Name: Laureen Nettles  MRN: [id number]  Today's Date: 1/31/2025    Admit Date: 1/24/2025    Plan: SNF at [facility]; Insurance Approved   Discharge Plan       Row Name 01/31/25 1551       Plan    Plan SNF at [facility]; Insurance Approved    Plan Comments Insurance has approved the patient to go to [facility]. Her pre cert with insurance is good until 2-4-25. Plan is for the patient to discharge to [facility] with  EMS on 2-3-25 at 1230. PCS faxed today. CM following.    Final Discharge Disposition Code 03 - skilled nursing facility (SNF)                   Discharge Codes    No documentation.                 Expected Discharge Date and Time       Expected Discharge Date Expected Discharge Time    Feb 3, 2025               Christine Bennett RN     Normal

## 2025-02-01 RX ADMIN — Medication 10 ML: at 10:50

## 2025-02-01 RX ADMIN — SENNOSIDES AND DOCUSATE SODIUM 2 TABLET: 50; 8.6 TABLET ORAL at 10:49

## 2025-02-01 RX ADMIN — ACETAMINOPHEN 650 MG: 325 TABLET ORAL at 05:40

## 2025-02-01 RX ADMIN — BISACODYL 10 MG: 10 SUPPOSITORY RECTAL at 02:26

## 2025-02-01 RX ADMIN — METOPROLOL SUCCINATE 25 MG: 25 TABLET, EXTENDED RELEASE ORAL at 10:50

## 2025-02-01 RX ADMIN — Medication 10 ML: at 20:57

## 2025-02-01 RX ADMIN — APIXABAN 2.5 MG: 2.5 TABLET, FILM COATED ORAL at 10:50

## 2025-02-01 RX ADMIN — LEVOTHYROXINE SODIUM 88 MCG: 0.09 TABLET ORAL at 05:40

## 2025-02-01 RX ADMIN — APIXABAN 2.5 MG: 2.5 TABLET, FILM COATED ORAL at 20:57

## 2025-02-01 NOTE — PLAN OF CARE
Goal Outcome Evaluation:             Very pleasant and cooperative no co pain or any problems. Several large loose bms today . Good po intake fluids encouraged. Anticipate dc Monday to [facility].

## 2025-02-01 NOTE — PROGRESS NOTES
[facility] Medicine Services  PROGRESS NOTE    Patient Name: Laureen Nettles  : 1932  MRN: [id number]    Date of Admission: 2025  Primary Care Physician: Criselda Padgett MD    Subjective   Subjective     CC:  F/u AMS    HPI:  Patient sleeping. Opens eyes and states she is fine and does not need anything    Objective   Objective     Vital Signs:   Temp:  [97.6 °F (36.4 °C)-98 °F (36.7 °C)] 97.7 °F (36.5 °C)  Heart Rate:  [] 101  Resp:  [18] 18  BP: (120-135)/(51-61) 120/54     Physical Exam:  Constitutional: No acute distress, sleeping. Arouses to voice  HENT: NCAT, mucous membranes moist  Respiratory: Clear to auscultation bilaterally, respiratory effort normal   Cardiovascular: RRR,  Gastrointestinal: Positive bowel sounds, soft, nontender, nondistended  Musculoskeletal: No bilateral ankle edema  Psychiatric: Appropriate affect, cooperative  Neurologic: Oriented x 3, FOLEY, speech clear  Skin: No rashes        Results Reviewed:  LAB RESULTS:      Lab 25  0702 25  1343 25  1017 25  0904 25  1349   WBC 11.41* 12.07* 12.38* 11.30* 12.98*   HEMOGLOBIN 12.4 11.3* 13.7 13.4 12.1   HEMATOCRIT 38.3 35.4 43.6 42.5 37.6   PLATELETS 223 214 210 184 215   NEUTROS ABS 8.61* 9.30* 9.77* 8.44* 10.60*   IMMATURE GRANS (ABS) 0.15* 0.20* 0.13* 0.13* 0.06*   LYMPHS ABS 1.58 1.52 1.59 1.72 1.42   MONOS ABS 0.77 0.73 0.64 0.76 0.83   EOS ABS 0.21 0.24 0.17 0.17 0.03   MCV 95.3 96.7 98.2* 97.7* 94.9         Lab 25  0702 25  1343 25  1017 25  0855 25  0301 25  1349   SODIUM 138 136 139 135* 141 141   POTASSIUM 4.0 4.2 4.3 4.2 3.5 3.9   CHLORIDE 107 106 106 107 106 104   CO2 22.0 22.0 20.0* 18.0* 26.0 31.0*   ANION GAP 9.0 8.0 13.0 10.0 9.0 6.0   BUN 30* 28* 29* 24* 45* 48*   CREATININE 0.80 1.03* 0.92 0.74 1.02* 1.56*   EGFR 69.2 51.1* 58.5* 76.0 51.7* 31.1*   GLUCOSE 90 205* 100* 94 110* 138*   CALCIUM 9.0 8.6 9.0 8.8 9.5  11.0*   IONIZED CALCIUM  --   --   --   --   --  1.37*   MAGNESIUM 2.2 2.1 2.4* 2.1  --  1.9   PHOSPHORUS  --   --   --   --  4.2 1.5*   TSH  --   --   --   --   --  3.570         Lab 01/30/25  1343 01/29/25  1017 01/28/25  0855   TOTAL PROTEIN 5.4* 6.2 5.6*   ALBUMIN 3.0* 3.3* 2.6*   GLOBULIN 2.4 2.9 3.0   ALT (SGPT) 11 8 5   AST (SGOT) 22 19 19   BILIRUBIN 0.3 0.3 0.3   ALK PHOS 57 58 51                       Brief Urine Lab Results  (Last result in the past 365 days)        Color   Clarity   Blood   Leuk Est   Nitrite   Protein   CREAT   Urine HCG        01/24/25 1222 Yellow   Turbid   Moderate (2+)   Large (3+)   Negative   30 mg/dL (1+)                   Microbiology Results Abnormal       Procedure Component Value - Date/Time    COVID PRE-OP / PRE-PROCEDURE SCREENING ORDER (NO ISOLATION) - Swab, Nasopharynx [[id number]]  (Abnormal) Collected: 01/24/25 1201    Lab Status: Final result Specimen: Swab from Nasopharynx Updated: 01/24/25 1256    Narrative:      The following orders were created for panel order COVID PRE-OP / PRE-PROCEDURE SCREENING ORDER (NO ISOLATION) - Swab, Nasopharynx.  Procedure                               Abnormality         Status                     ---------                               -----------         ------                     COVID-19 and FLU A/B PCR...[[id number]]  Abnormal            Final result                 Please view results for these tests on the individual orders.    COVID-19 and FLU A/B PCR, 1 HR TAT - Swab, Nasopharynx [[id number]]  (Abnormal) Collected: 01/24/25 1201    Lab Status: Final result Specimen: Swab from Nasopharynx Updated: 01/24/25 1256     COVID19 Detected     Influenza A PCR Not Detected     Influenza B PCR Not Detected    Narrative:      Fact sheet for providers: https://www.fda.gov/media/844827/download    Fact sheet for patients: https://www.fda.gov/media/930427/download    Test performed by PCR.  Influenza A and Influenza B negative results should be  considered presumptive in samples that have a positive SARS-CoV-2 result.    Competitive inhibition studies showed that SARS-CoV-2 virus, when present at concentrations above 3.6E+04 copies/mL, can inhibit the detection and amplification of influenza A and influenza B virus RNA if present at or below 1.8E+02 copies/mL or 4.9E+02 copies/mL, respectively, and may lead to false negative influenza virus results. If co-infection with influenza A or influenza B virus is suspected in samples with a positive SARS-CoV-2 result, the sample should be re-tested with another FDA cleared, approved, or authorized influenza test, if influenza virus detection would change clinical management.            No radiology results from the last 24 hrs    Results for orders placed during the hospital encounter of 08/29/21    Adult Transthoracic Echo Complete W/ Cont if Necessary Per Protocol    Interpretation Summary  · Estimated left ventricular EF = 30-35%. Moderate to severe LV systolic dysfunction.  · Left ventricular wall thickness is consistent with mild concentric hypertrophy.  · Left ventricular diastolic function is consistent with (grade II w/high LAP) pseudonormalization.  · Severe aortic valve regurgitation is present.  · Moderate to severe mitral valve regurgitation is present.  · Mild tricuspid valve regurgitation is present  · Estimated right ventricular systolic pressure from tricuspid regurgitation is normal (<35 mmHg).      Current medications:  Scheduled Meds:apixaban, 2.5 mg, Oral, BID  [Held by provider] furosemide, 20 mg, Oral, Daily  levothyroxine, 88 mcg, Oral, Q AM  metoprolol succinate XL, 25 mg, Oral, Daily  senna-docusate sodium, 2 tablet, Oral, BID  sodium chloride, 10 mL, Intravenous, Q12H  [Held by provider] spironolactone, 25 mg, Oral, Daily      Continuous Infusions:   PRN Meds:.  acetaminophen    senna-docusate sodium **AND** polyethylene glycol **AND** bisacodyl **AND** bisacodyl    Calcium Replacement -  Follow Nurse / BPA Driven Protocol    Magnesium Standard Dose Replacement - Follow Nurse / BPA Driven Protocol    ondansetron    Phosphorus Replacement - Follow Nurse / BPA Driven Protocol    Polyvinyl Alcohol-Povidone PF    Potassium Replacement - Follow Nurse / BPA Driven Protocol    sodium chloride    sodium chloride    sodium chloride    Assessment & Plan   Assessment & Plan     Active Hospital Problems    Diagnosis  POA    **UTI (urinary tract infection) [N39.0]  Yes    Dehydration [E86.0]  Yes      Resolved Hospital Problems   No resolved problems to display.        Brief Hospital Course to date:  Laureen Nettles is a 89 y.o. female with history of hypothyroidism and paroxysmal A-fib who lives at [facility] and was sent for lethargy and mental status changes.  Labs significant for hypercalcemia and mild MEENAKSHI.       This patient's problems and plans were partially entered by my partner and updated as appropriate by me 02/01/25.      Lethargy, improved   Hypercalcemia  -Likely multifactorial with hypercalcemia and dehydration found on admission, recent COVID  -PTH, TSH wnl  -Calcium supplements stopped.  Improved PO intake/ hydration    -Pt was on lasix/ aldactone - would stop or make them PRN at DC. Have been held     COVID +  Possible L base infiltrate vs atelectasis  - COVID diagnosed on 1/21/25   - ? L basilar infiltrate. Infiltrate is likely from COVID and not bacterial or infectious. Patient also s/p cefazolin for suspected UTI   - denies symptoms  - WBCs trended down, stable    UTI, ruled out  -UA + but contaminated , treated empirically with cefazolin x 3 days  -Ucx no growth     MEENAKSHI - resolved   - baseline Cr 0.9, currently 0.8  - spironolactone and Lasix held, BP normotensive     Dual chamber PPM   PAFib   History of CHF, EF 30% in 2021  - follows with Dr. Ascencio  - started on lasix/spironolactone for this in 2021  - hold diuretics and perhaps stop permanently, no edema/evidence of  volume overload on exam  - metoprolol for rate control, low EF   - Eliquis for anticoagulation     Weakness/debility  -PT, OT following  -Currently too weak to return to assisted-living, plan is SNF  -Up to chair    Expected Discharge Location and Transportation: [facility] Monday 1230  Expected Discharge   Expected Discharge Date: 2/3/2025; Expected Discharge Time: 12:00 PM     VTE Prophylaxis:  Pharmacologic & mechanical VTE prophylaxis orders are present.         AM-PAC 6 Clicks Score (PT): 12 (01/31/25 2000)    CODE STATUS:   Code Status and Medical Interventions: CPR (Attempt to Resuscitate); Full Support   Ordered at: 01/24/25 1345     Level Of Support Discussed With:    Patient     Code Status (Patient has no pulse and is not breathing):    CPR (Attempt to Resuscitate)     Medical Interventions (Patient has pulse or is breathing):    Full Support       Jazmyne العراقي, APRN  02/01/25

## 2025-02-01 NOTE — PLAN OF CARE
Goal Outcome Evaluation:  Plan of Care Reviewed With: patient        Progress: improving  Outcome Evaluation: VSS on RA. Hard of hearing. Oriented to self, place, and year. Forgetful. Good PO intake. Adequate UOP. Bloating noted as well as abdominal fullness reported by pt. PRNs given for constipation as no meaningful BM since 1/26. No result from bowel meds with pt increasingly c/o abdominal discomfort. Provider notified. One time soap suds enema administered with great effect. Turns conducted. Up to BSC with x1 assist. Tentative plan to d/c to [facility] on Monday 2/3 via EMS.

## 2025-02-02 RX ADMIN — APIXABAN 2.5 MG: 2.5 TABLET, FILM COATED ORAL at 21:12

## 2025-02-02 RX ADMIN — Medication 10 ML: at 08:55

## 2025-02-02 RX ADMIN — LEVOTHYROXINE SODIUM 88 MCG: 0.09 TABLET ORAL at 05:11

## 2025-02-02 RX ADMIN — APIXABAN 2.5 MG: 2.5 TABLET, FILM COATED ORAL at 08:55

## 2025-02-02 RX ADMIN — METOPROLOL SUCCINATE 25 MG: 25 TABLET, EXTENDED RELEASE ORAL at 08:55

## 2025-02-02 RX ADMIN — Medication 10 ML: at 21:12

## 2025-02-02 NOTE — PROGRESS NOTES
"    [facility] Medicine Services  PROGRESS NOTE    Patient Name: Laureen Nettles  : 1932  MRN: [id number]    Date of Admission: 2025  Primary Care Physician: Criselda Padgett MD    Subjective   Subjective     CC:  F/u AMS    HPI:  Pt sitting up in bed. She appears hard of hearing. She is A&O to self. When asked if she was doing ok, she said \"yes\". She didn't answer any other questions.     Objective   Objective     Vital Signs:   Temp:  [96.8 °F (36 °C)-98.2 °F (36.8 °C)] 98.2 °F (36.8 °C)  Heart Rate:  [79-94] 90  Resp:  [16-18] 16  BP: ()/(54-68) 130/68     Physical Exam:  Constitutional: Awake, alert, NAD, frail and chronically ill appearing  HENT: NCAT, mucous membranes moist, appears [place]  Respiratory: Clear to auscultation bilaterally, nonlabored   Cardiovascular: RRR, no murmurs, rubs, or gallops  Gastrointestinal: Positive bowel sounds, soft, nontender, nondistended  Musculoskeletal: No bilateral ankle edema  Psychiatric: Appropriate affect, cooperative  Neurologic: Oriented to self, limited words   Skin: No rashes          Results Reviewed:  LAB RESULTS:      Lab 25  0702 25  1343 25  1017 25  0904   WBC 11.41* 12.07* 12.38* 11.30*   HEMOGLOBIN 12.4 11.3* 13.7 13.4   HEMATOCRIT 38.3 35.4 43.6 42.5   PLATELETS 223 214 210 184   NEUTROS ABS 8.61* 9.30* 9.77* 8.44*   IMMATURE GRANS (ABS) 0.15* 0.20* 0.13* 0.13*   LYMPHS ABS 1.58 1.52 1.59 1.72   MONOS ABS 0.77 0.73 0.64 0.76   EOS ABS 0.21 0.24 0.17 0.17   MCV 95.3 96.7 98.2* 97.7*         Lab 25  0702 25  1343 25  1017 25  0855   SODIUM 138 136 139 135*   POTASSIUM 4.0 4.2 4.3 4.2   CHLORIDE 107 106 106 107   CO2 22.0 22.0 20.0* 18.0*   ANION GAP 9.0 8.0 13.0 10.0   BUN 30* 28* 29* 24*   CREATININE 0.80 1.03* 0.92 0.74   EGFR 69.2 51.1* 58.5* 76.0   GLUCOSE 90 205* 100* 94   CALCIUM 9.0 8.6 9.0 8.8   MAGNESIUM 2.2 2.1 2.4* 2.1         Lab 25  1343 25  1017 " 01/28/25  0855   TOTAL PROTEIN 5.4* 6.2 5.6*   ALBUMIN 3.0* 3.3* 2.6*   GLOBULIN 2.4 2.9 3.0   ALT (SGPT) 11 8 5   AST (SGOT) 22 19 19   BILIRUBIN 0.3 0.3 0.3   ALK PHOS 57 58 51                       Brief Urine Lab Results  (Last result in the past 365 days)        Color   Clarity   Blood   Leuk Est   Nitrite   Protein   CREAT   Urine HCG        01/24/25 1222 Yellow   Turbid   Moderate (2+)   Large (3+)   Negative   30 mg/dL (1+)                   Microbiology Results Abnormal       Procedure Component Value - Date/Time    COVID PRE-OP / PRE-PROCEDURE SCREENING ORDER (NO ISOLATION) - Swab, Nasopharynx [[id number]]  (Abnormal) Collected: 01/24/25 1201    Lab Status: Final result Specimen: Swab from Nasopharynx Updated: 01/24/25 1256    Narrative:      The following orders were created for panel order COVID PRE-OP / PRE-PROCEDURE SCREENING ORDER (NO ISOLATION) - Swab, Nasopharynx.  Procedure                               Abnormality         Status                     ---------                               -----------         ------                     COVID-19 and FLU A/B PCR...[[id number]]  Abnormal            Final result                 Please view results for these tests on the individual orders.    COVID-19 and FLU A/B PCR, 1 HR TAT - Swab, Nasopharynx [[id number]]  (Abnormal) Collected: 01/24/25 1201    Lab Status: Final result Specimen: Swab from Nasopharynx Updated: 01/24/25 1256     COVID19 Detected     Influenza A PCR Not Detected     Influenza B PCR Not Detected    Narrative:      Fact sheet for providers: https://www.fda.gov/media/601578/download    Fact sheet for patients: https://www.fda.gov/media/961962/download    Test performed by PCR.  Influenza A and Influenza B negative results should be considered presumptive in samples that have a positive SARS-CoV-2 result.    Competitive inhibition studies showed that SARS-CoV-2 virus, when present at concentrations above 3.6E+04 copies/mL, can inhibit the  detection and amplification of influenza A and influenza B virus RNA if present at or below 1.8E+02 copies/mL or 4.9E+02 copies/mL, respectively, and may lead to false negative influenza virus results. If co-infection with influenza A or influenza B virus is suspected in samples with a positive SARS-CoV-2 result, the sample should be re-tested with another FDA cleared, approved, or authorized influenza test, if influenza virus detection would change clinical management.            No radiology results from the last 24 hrs    Results for orders placed during the hospital encounter of 08/29/21    Adult Transthoracic Echo Complete W/ Cont if Necessary Per Protocol    Interpretation Summary  · Estimated left ventricular EF = 30-35%. Moderate to severe LV systolic dysfunction.  · Left ventricular wall thickness is consistent with mild concentric hypertrophy.  · Left ventricular diastolic function is consistent with (grade II w/high LAP) pseudonormalization.  · Severe aortic valve regurgitation is present.  · Moderate to severe mitral valve regurgitation is present.  · Mild tricuspid valve regurgitation is present  · Estimated right ventricular systolic pressure from tricuspid regurgitation is normal (<35 mmHg).      Current medications:  Scheduled Meds:apixaban, 2.5 mg, Oral, BID  [Held by provider] furosemide, 20 mg, Oral, Daily  levothyroxine, 88 mcg, Oral, Q AM  metoprolol succinate XL, 25 mg, Oral, Daily  senna-docusate sodium, 2 tablet, Oral, BID  sodium chloride, 10 mL, Intravenous, Q12H  [Held by provider] spironolactone, 25 mg, Oral, Daily      Continuous Infusions:   PRN Meds:.  acetaminophen    senna-docusate sodium **AND** polyethylene glycol **AND** bisacodyl **AND** bisacodyl    Calcium Replacement - Follow Nurse / BPA Driven Protocol    Magnesium Standard Dose Replacement - Follow Nurse / BPA Driven Protocol    ondansetron    Phosphorus Replacement - Follow Nurse / BPA Driven Protocol    Polyvinyl  Alcohol-Povidone PF    Potassium Replacement - Follow Nurse / BPA Driven Protocol    sodium chloride    sodium chloride    sodium chloride    Assessment & Plan   Assessment & Plan     Active Hospital Problems    Diagnosis  POA    **UTI (urinary tract infection) [N39.0]  Yes    Dehydration [E86.0]  Yes      Resolved Hospital Problems   No resolved problems to display.        Brief Hospital Course to date:  Laureen Nettles is a 89 y.o. female with history of hypothyroidism and paroxysmal A-fib who lives at [facility] and was sent for lethargy and mental status changes.  Labs significant for hypercalcemia and mild MEENAKSHI.       This patient's problems and plans were partially entered by my partner and updated as appropriate by me 02/02/25.      Lethargy, improved   Hypercalcemia  -Likely multifactorial with hypercalcemia and dehydration found on admission, recent COVID  -PTH, TSH wnl  -Calcium supplements stopped.  Improved PO intake/ hydration    -Pt was on lasix/ aldactone - would stop or make them PRN at DC. Have been held     COVID +  Possible L base infiltrate vs atelectasis  - COVID diagnosed on 1/21/25   - ? L basilar infiltrate. Infiltrate is likely from COVID and not bacterial or infectious. Patient also s/p cefazolin for suspected UTI   - denies symptoms  - WBCs trended down, stable    UTI, ruled out  -UA + but contaminated , treated empirically with cefazolin x 3 days  -Ucx no growth     MEENAKSHI - resolved   - baseline Cr 0.9, currently 0.8  - spironolactone and Lasix held, BP normotensive     Dual chamber PPM   PAFib   History of CHF, EF 30% in 2021  - follows with Dr. Ascencio  - started on lasix/spironolactone for this in 2021  - hold diuretics and perhaps stop permanently, no edema/evidence of volume overload on exam  - metoprolol for rate control, low EF   - Eliquis for anticoagulation     Weakness/debility  -PT, OT following  -Currently too weak to return to assisted-living, plan is SNF  -Up to  chair    Expected Discharge Location and Transportation: [facility] Monday 1230  Expected Discharge   Expected Discharge Date: 2/3/2025; Expected Discharge Time: 12:00 PM     VTE Prophylaxis:  Pharmacologic & mechanical VTE prophylaxis orders are present.         AM-PAC 6 Clicks Score (PT): 16 (02/01/25 2000)    CODE STATUS:   Code Status and Medical Interventions: CPR (Attempt to Resuscitate); Full Support   Ordered at: 01/24/25 1345     Level Of Support Discussed With:    Patient     Code Status (Patient has no pulse and is not breathing):    CPR (Attempt to Resuscitate)     Medical Interventions (Patient has pulse or is breathing):    Full Support       NASREEN Nobles  02/02/25

## 2025-02-02 NOTE — PLAN OF CARE
Goal Outcome Evaluation:              Outcome Evaluation: Vitals stable and pt on room air. Airborne precautions maintained. Pt is oriented to self. No PRNs required. Purewick in place. BM this shift. Pt turned q2hr. Pt resting in between care. Plan of care ongoing.

## 2025-02-02 NOTE — PLAN OF CARE
Goal Outcome Evaluation:          Pleasant and cooperative. No co pain or any problems. Anticipate dc tomorrow to [facility].

## 2025-02-03 VITALS
HEART RATE: 112 BPM | DIASTOLIC BLOOD PRESSURE: 63 MMHG | HEIGHT: 60 IN | TEMPERATURE: 97.2 F | OXYGEN SATURATION: 93 % | BODY MASS INDEX: 16.88 KG/M2 | WEIGHT: 86 LBS | RESPIRATION RATE: 16 BRPM | SYSTOLIC BLOOD PRESSURE: 137 MMHG

## 2025-02-03 PROBLEM — N39.0 UTI (URINARY TRACT INFECTION): Status: RESOLVED | Noted: 2025-01-24 | Resolved: 2025-02-03

## 2025-02-03 PROBLEM — E86.0 DEHYDRATION: Status: RESOLVED | Noted: 2025-01-26 | Resolved: 2025-02-03

## 2025-02-03 RX ADMIN — METOPROLOL SUCCINATE 25 MG: 25 TABLET, EXTENDED RELEASE ORAL at 09:42

## 2025-02-03 RX ADMIN — LEVOTHYROXINE SODIUM 88 MCG: 0.09 TABLET ORAL at 05:18

## 2025-02-03 RX ADMIN — APIXABAN 2.5 MG: 2.5 TABLET, FILM COATED ORAL at 09:42

## 2025-02-03 RX ADMIN — SENNOSIDES AND DOCUSATE SODIUM 2 TABLET: 50; 8.6 TABLET ORAL at 09:42

## 2025-02-03 RX ADMIN — Medication 10 ML: at 09:43

## 2025-02-03 NOTE — PLAN OF CARE
Goal Outcome Evaluation:  Plan of Care Reviewed With: family        Progress: improving  Outcome Evaluation: Pt rested well throughout shift. VSS on RA. No PRN medications indicated. [place] @ baseline. PW in place. D/C to [facility] @ 3840 on 2/3. No other concerns noted at this time.

## 2025-02-03 NOTE — PLAN OF CARE
Goal Outcome Evaluation:  Plan of Care Reviewed With: patient                 VSS on RA. Patient denies pain. She is extremely hard of hearing. Tolerating diet. Adequate UOP. Report called to ELLIOT San at [facility]. EMS transfer at 1230. Discharge education completed.

## 2025-02-03 NOTE — CASE MANAGEMENT/SOCIAL WORK
Case Management Discharge Note      Final Note: Patient to discharge to [facility] today.  EMS transporting at 1230. PCS faxed. IMM given to patient's POA, Arden Marie, by phone and he requested a copy be sent to his email [email or web address]. I have completed this. RN will need to call report to [phone]. CM will fax the discharge summary when available. [facility] is the facility pharmacy. I have updated Epic     DC Summary faxed to [facility] at [phone]    Patient going to [address] room 317. I have requested that the [facility] nurse call Mr Marie to address his questions and notify him that she has arrive to their building.    Selected Continued Care - Admitted Since 1/24/2025       Destination Coordination complete.      Service Provider Services Address Phone Fax Patient Preferred    [facility] [address], [facility] [id number] [phone] --              Durable Medical Equipment    No services have been selected for the patient.                Dialysis/Infusion    No services have been selected for the patient.                Home Medical Care    No services have been selected for the patient.                Therapy    No services have been selected for the patient.                Community Resources    No services have been selected for the patient.                Community & DME    No services have been selected for the patient.                    Transportation Services  Ambulance: [facility]    Final Discharge Disposition Code: 03 - skilled nursing facility (SNF)

## 2025-02-03 NOTE — DISCHARGE SUMMARY
[facility] Medicine Services  DISCHARGE SUMMARY    Patient Name: Laureen Nettles  : 1932  MRN: [id number]    Date of Admission: 2025  Date of Discharge:  2025  Primary Care Physician: Criselda Padgett MD    Consults       No orders found from 2024 to 2025.            Hospital Course     Presenting Problem:   UTI (urinary tract infection) [N39.0]  Dehydration [E86.0]    Active Hospital Problems   No active problems to display.      Resolved Hospital Problems    Diagnosis Date Resolved POA    **UTI (urinary tract infection) [N39.0] 2025 Yes    Dehydration [E86.0] 2025 Yes          Hospital Course:  Laureen Nettles is a 89 y.o. female PMH hypothyroidism, PAF (Eliquis), HTN who lives at [facility] [place] was sent due to lethargy and mental status changes.  Patient found positive for COVID, hypercalcemia and mild MEENAKSHI.      Assessment/plan  Lethargy-improved  Hypercalcemia  - Likely multifactorial with hypercalcemia and dehydration noted on admission, recent COVID  - PTH, TSH WNL  - Calcium supplements stopped, improved with p.o. intake/hydration  - Lasix and Aldactone was stopped, consider making them as needed in the future    COVID positive  Possible L base infiltrate versus atelectasis  - COVID diagnosed on 2025  - Questionable left basilar infiltrate.  Infiltrates likely from COVID and not bacterial or infectious.  Patient also s/p cefazolin for suspected UTI  - Patient asymptomatic  - WBC trending downward    UTI ruled out  - UA showing contamination, treated empirically with cefazolin x 3 days  - UCX no growth    MEENAKSHI-resolved  - Baseline Cr 0.9  - Spironolactone and Lasix held, BP normotensive and patient euvolemic    Dual-chamber PPM  PAF  History of CHF EF 30% in   - Follows with Dr. King  - Started on Lasix spironolactone   - Diuretics held, perhaps stop permanently or make them as needed in the  "future    Weakness/debility  - PT/OT  - currently too weak to return to assisted living plan for SNF      Discharge Follow Up Recommendations for labs/diagnostics:  Follow-up with PCP in 1 week    Day of Discharge     HPI:   Pt resting in bed. She is A&O to self. Unable to identify where she is currently. Denies pain, nausea.     Otherwise ROS is negative except as mentioned in the HPI.    Vital Signs:   Temp:  [97.2 °F (36.2 °C)-98.9 °F (37.2 °C)] 97.2 °F (36.2 °C)  Heart Rate:  [] 112  Resp:  [16-18] 16  BP: (111-137)/(56-68) 137/63     Physical Exam:  Constitutional: Awake, alert, NAD, frail and chronically ill appearing  HENT: NCAT, mucous membranes moist, [place]  Respiratory: Clear to auscultation bilaterally, nonlabored respirations   Cardiovascular: RRR, no murmurs, rubs, or gallop  Gastrointestinal: Positive bowel sounds, soft, nontender, nondistended  Musculoskeletal: No bilateral ankle edema  Psychiatric: Appropriate affect, cooperative  Neurologic: Oriented to self, FOLEY, speech clear  Skin: No rashes      Pertinent  and/or Most Recent Results     Results from last 7 days   Lab Units 01/31/25  0702 01/30/25  1343 01/29/25  1017 01/28/25  0904 01/28/25  0855   WBC 10*3/mm3 11.41* 12.07* 12.38* 11.30*  --    HEMOGLOBIN g/dL 12.4 11.3* 13.7 13.4  --    HEMATOCRIT % 38.3 35.4 43.6 42.5  --    PLATELETS 10*3/mm3 223 214 210 184  --    SODIUM mmol/L 138 136 139  --  135*   POTASSIUM mmol/L 4.0 4.2 4.3  --  4.2   CHLORIDE mmol/L 107 106 106  --  107   CO2 mmol/L 22.0 22.0 20.0*  --  18.0*   BUN mg/dL 30* 28* 29*  --  24*   CREATININE mg/dL 0.80 1.03* 0.92  --  0.74   GLUCOSE mg/dL 90 205* 100*  --  94   CALCIUM mg/dL 9.0 8.6 9.0  --  8.8     Results from last 7 days   Lab Units 01/30/25  1343 01/29/25  1017 01/28/25  0855   BILIRUBIN mg/dL 0.3 0.3 0.3   ALK PHOS U/L 57 58 51   ALT (SGPT) U/L 11 8 5   AST (SGOT) U/L 22 19 19           Invalid input(s): \"TG\", \"LDLCALC\", \"LDLREALC\"        Brief Urine Lab Results  " (Last result in the past 365 days)        Color   Clarity   Blood   Leuk Est   Nitrite   Protein   CREAT   Urine HCG        01/24/25 1222 Yellow   Turbid   Moderate (2+)   Large (3+)   Negative   30 mg/dL (1+)                   Microbiology Results Abnormal       Procedure Component Value - Date/Time    COVID PRE-OP / PRE-PROCEDURE SCREENING ORDER (NO ISOLATION) - Swab, Nasopharynx [[id number]]  (Abnormal) Collected: 01/24/25 1201    Lab Status: Final result Specimen: Swab from Nasopharynx Updated: 01/24/25 1256    Narrative:      The following orders were created for panel order COVID PRE-OP / PRE-PROCEDURE SCREENING ORDER (NO ISOLATION) - Swab, Nasopharynx.  Procedure                               Abnormality         Status                     ---------                               -----------         ------                     COVID-19 and FLU A/B PCR...[[id number]]  Abnormal            Final result                 Please view results for these tests on the individual orders.    COVID-19 and FLU A/B PCR, 1 HR TAT - Swab, Nasopharynx [[id number]]  (Abnormal) Collected: 01/24/25 1201    Lab Status: Final result Specimen: Swab from Nasopharynx Updated: 01/24/25 1256     COVID19 Detected     Influenza A PCR Not Detected     Influenza B PCR Not Detected    Narrative:      Fact sheet for providers: https://www.fda.gov/media/333487/download    Fact sheet for patients: https://www.fda.gov/media/639571/download    Test performed by PCR.  Influenza A and Influenza B negative results should be considered presumptive in samples that have a positive SARS-CoV-2 result.    Competitive inhibition studies showed that SARS-CoV-2 virus, when present at concentrations above 3.6E+04 copies/mL, can inhibit the detection and amplification of influenza A and influenza B virus RNA if present at or below 1.8E+02 copies/mL or 4.9E+02 copies/mL, respectively, and may lead to false negative influenza virus results. If co-infection with  influenza A or influenza B virus is suspected in samples with a positive SARS-CoV-2 result, the sample should be re-tested with another FDA cleared, approved, or authorized influenza test, if influenza virus detection would change clinical management.            Imaging Results (All)       Procedure Component Value Units Date/Time    XR Chest 1 View [[id number]] Collected: 01/24/25 1221     Updated: 01/24/25 1225    Narrative:      XR CHEST 1 VW    Date of Exam: 1/24/2025 11:49 AM EST    Indication: Weak/Dizzy/AMS triage protocol    Comparison: Chest x-ray 8/29/2021    Findings:  The heart is stable in size. There is some left lower lobe airspace disease at the costophrenic angle and suspected underlying pleural effusion. No evidence for pneumothorax. Cardiac pacemaker is noted.      Impression:      Impression:  Some suspected left lower lobe airspace disease of the costophrenic angle with potential trace effusion.      Electronically Signed: Adriana Asencio MD    1/24/2025 12:22 PM EST    Workstation ID: [id number]                    Results for orders placed during the hospital encounter of 08/29/21    Adult Transthoracic Echo Complete W/ Cont if Necessary Per Protocol    Interpretation Summary  · Estimated left ventricular EF = 30-35%. Moderate to severe LV systolic dysfunction.  · Left ventricular wall thickness is consistent with mild concentric hypertrophy.  · Left ventricular diastolic function is consistent with (grade II w/high LAP) pseudonormalization.  · Severe aortic valve regurgitation is present.  · Moderate to severe mitral valve regurgitation is present.  · Mild tricuspid valve regurgitation is present  · Estimated right ventricular systolic pressure from tricuspid regurgitation is normal (<35 mmHg).        Discharge Details        Discharge Medications        Continue These Medications        Instructions Start Date   alendronate 70 MG tablet  Commonly known as: FOSAMAX   70 mg, Every 7 Days       Cholecalciferol 50 MCG (2000 UT) capsule   Take 1 capsule by mouth Daily.      cyanocobalamin 1000 MCG/ML injection   1,000 mcg, Every 28 Days      Eliquis 2.5 MG tablet tablet  Generic drug: apixaban   2.5 mg, Oral, 2 Times Daily      levothyroxine 88 MCG tablet  Commonly known as: SYNTHROID, LEVOTHROID   88 mcg, Every Early Morning      metoprolol succinate XL 25 MG 24 hr tablet  Commonly known as: Toprol XL   25 mg, Oral, Daily      PRESERVISION AREDS 2+MULTI VIT PO   Take 1 tablet by mouth 2 (Two) Times a Day.             Stop These Medications      calcium carbonate 600 MG tablet  Commonly known as: OS-FE     furosemide 20 MG tablet  Commonly known as: LASIX     rosuvastatin 10 MG tablet  Commonly known as: CRESTOR     spironolactone 25 MG tablet  Commonly known as: ALDACTONE              Allergies   Allergen Reactions    Penicillins Anaphylaxis    Potassium-Containing Compounds Rash         Discharge Disposition:  Skilled Nursing Facility (DC - External)    Discharge Diet:  Diet Order   Procedures    Diet: Regular/House; Fluid Consistency: Thin (IDDSI 0)         Discharge Activity:   Activity Instructions       Activity as Tolerated      Up WIth Assist                CODE STATUS:    Code Status and Medical Interventions: CPR (Attempt to Resuscitate); Full Support   Ordered at: 01/24/25 1345     Level Of Support Discussed With:    Patient     Code Status (Patient has no pulse and is not breathing):    CPR (Attempt to Resuscitate)     Medical Interventions (Patient has pulse or is breathing):    Full Support         Future Appointments   Date Time Provider Department Center   2/3/2025 12:30 PM MED 11  [facility] None   4/7/2026  2:15 PM Daniel Ascencio MD [facility]       Additional Instructions for the Follow-ups that You Need to Schedule       Discharge Follow-up with PCP   As directed       Currently Documented PCP:    Criselda Padgett MD    PCP Phone Number:    [phone]     Follow Up  Details: Follow up with PCP in 1 week                Time Spent on Discharge:  45 minutes    Electronically signed by NASREEN Nobles, 02/03/25, 9:45 AM EST.

## 2025-02-03 NOTE — DISCHARGE PLACEMENT REQUEST
"Rosa Osuna (89 y.o. Female)       Date of Birth   1932    Social Security Number       Address   [address] [address]    Home Phone   [phone]    MRN   [id number]       [facility]    Marital Status   Single                            Admission Date   25    Admission Type   Emergency    Admitting Provider   Wilber Castillo MD    Attending Provider   Wilber Castillo MD    Department, Room/Bed   [facility] 5B, N532/1       Discharge Date       Discharge Disposition   Skilled Nursing Facility (DC - External)    Discharge Destination                                 Attending Provider: Wilber Castillo MD    Allergies: Penicillins, Potassium-containing Compounds    Isolation: Contact Air   Infection: COVID (confirmed) (25)   Code Status: CPR    Ht: 152.4 cm (60\")   Wt: 39 kg (86 lb)    Admission Cmt: None   Principal Problem: UTI (urinary tract infection) [N39.0]                   Active Insurance as of 2025       Primary Coverage       Payor Plan Insurance Group Employer/Plan Group    HUMANA MEDICARE REPLACEMENT HUMANA MED ADV HMO [id number]       Payor Plan Address Payor Plan Phone Number Payor Plan Fax Number Effective Dates    [address] [address] [phone]  2025 - None Entered    [facility] [id number]         Subscriber Name Subscriber Birth Date Member ID       ROSA OSUNA 1932 [id number]                     Emergency Contacts        (Rel.) Home Phone Work Phone Mobile Phone    Arden Marie (Other) -- -- [phone]    Shara Marie (Relative) -- -- [phone]                 Discharge Summary        Marian Chopra APRN at 25 0945              [facility] Medicine Services  DISCHARGE SUMMARY    Patient Name: Rosa Osuna  : 1932  MRN: [id number]    Date of Admission: 2025  Date of Discharge:  2025  Primary Care Physician: Criselda Padgett " Patient is not listed as being on this medication in the recent past and is not established with me.  Needs to establish with a primary care for refills   MD MARIA    Consults       No orders found from 12/26/2024 to 1/25/2025.            Hospital Course     Presenting Problem:   UTI (urinary tract infection) [N39.0]  Dehydration [E86.0]    Active Hospital Problems   No active problems to display.      Resolved Hospital Problems    Diagnosis Date Resolved POA    **UTI (urinary tract infection) [N39.0] 02/03/2025 Yes    Dehydration [E86.0] 02/03/2025 Yes          Hospital Course:  Laureen Nettles is a 89 y.o. female PMH hypothyroidism, PAF (Eliquis), HTN who lives at [facility] [place] was sent due to lethargy and mental status changes.  Patient found positive for COVID, hypercalcemia and mild MEENAKSHI.      Assessment/plan  Lethargy-improved  Hypercalcemia  - Likely multifactorial with hypercalcemia and dehydration noted on admission, recent COVID  - PTH, TSH WNL  - Calcium supplements stopped, improved with p.o. intake/hydration  - Lasix and Aldactone was stopped, consider making them as needed in the future    COVID positive  Possible L base infiltrate versus atelectasis  - COVID diagnosed on 1/21/2025  - Questionable left basilar infiltrate.  Infiltrates likely from COVID and not bacterial or infectious.  Patient also s/p cefazolin for suspected UTI  - Patient asymptomatic  - WBC trending downward    UTI ruled out  - UA showing contamination, treated empirically with cefazolin x 3 days  - UCX no growth    MEENAKSHI-resolved  - Baseline Cr 0.9  - Spironolactone and Lasix held, BP normotensive and patient euvolemic    Dual-chamber PPM  PAF  History of CHF EF 30% in 2021  - Follows with Dr. King  - Started on Lasix spironolactone 2021  - Diuretics held, perhaps stop permanently or make them as needed in the future    Weakness/debility  - PT/OT  - currently too weak to return to assisted living plan for SNF      Discharge Follow Up Recommendations for labs/diagnostics:  Follow-up with PCP in 1 week    Day of Discharge     HPI:   Pt resting in bed. She is A&O to self.  "Unable to identify where she is currently. Denies pain, nausea.     Otherwise ROS is negative except as mentioned in the HPI.    Vital Signs:   Temp:  [97.2 °F (36.2 °C)-98.9 °F (37.2 °C)] 97.2 °F (36.2 °C)  Heart Rate:  [] 112  Resp:  [16-18] 16  BP: (111-137)/(56-68) 137/63     Physical Exam:  Constitutional: Awake, alert, NAD, frail and chronically ill appearing  HENT: NCAT, mucous membranes moist, [place]  Respiratory: Clear to auscultation bilaterally, nonlabored respirations   Cardiovascular: RRR, no murmurs, rubs, or gallop  Gastrointestinal: Positive bowel sounds, soft, nontender, nondistended  Musculoskeletal: No bilateral ankle edema  Psychiatric: Appropriate affect, cooperative  Neurologic: Oriented to self, FOLEY, speech clear  Skin: No rashes      Pertinent  and/or Most Recent Results     Results from last 7 days   Lab Units 01/31/25  0702 01/30/25  1343 01/29/25  1017 01/28/25  0904 01/28/25  0855   WBC 10*3/mm3 11.41* 12.07* 12.38* 11.30*  --    HEMOGLOBIN g/dL 12.4 11.3* 13.7 13.4  --    HEMATOCRIT % 38.3 35.4 43.6 42.5  --    PLATELETS 10*3/mm3 223 214 210 184  --    SODIUM mmol/L 138 136 139  --  135*   POTASSIUM mmol/L 4.0 4.2 4.3  --  4.2   CHLORIDE mmol/L 107 106 106  --  107   CO2 mmol/L 22.0 22.0 20.0*  --  18.0*   BUN mg/dL 30* 28* 29*  --  24*   CREATININE mg/dL 0.80 1.03* 0.92  --  0.74   GLUCOSE mg/dL 90 205* 100*  --  94   CALCIUM mg/dL 9.0 8.6 9.0  --  8.8     Results from last 7 days   Lab Units 01/30/25  1343 01/29/25  1017 01/28/25  0855   BILIRUBIN mg/dL 0.3 0.3 0.3   ALK PHOS U/L 57 58 51   ALT (SGPT) U/L 11 8 5   AST (SGOT) U/L 22 19 19           Invalid input(s): \"TG\", \"LDLCALC\", \"LDLREALC\"        Brief Urine Lab Results  (Last result in the past 365 days)        Color   Clarity   Blood   Leuk Est   Nitrite   Protein   CREAT   Urine HCG        01/24/25 1222 Yellow   Turbid   Moderate (2+)   Large (3+)   Negative   30 mg/dL (1+)                   Microbiology Results Abnormal       " Procedure Component Value - Date/Time    COVID PRE-OP / PRE-PROCEDURE SCREENING ORDER (NO ISOLATION) - Swab, Nasopharynx [[id number]]  (Abnormal) Collected: 01/24/25 1201    Lab Status: Final result Specimen: Swab from Nasopharynx Updated: 01/24/25 1256    Narrative:      The following orders were created for panel order COVID PRE-OP / PRE-PROCEDURE SCREENING ORDER (NO ISOLATION) - Swab, Nasopharynx.  Procedure                               Abnormality         Status                     ---------                               -----------         ------                     COVID-19 and FLU A/B PCR...[[id number]]  Abnormal            Final result                 Please view results for these tests on the individual orders.    COVID-19 and FLU A/B PCR, 1 HR TAT - Swab, Nasopharynx [[id number]]  (Abnormal) Collected: 01/24/25 1201    Lab Status: Final result Specimen: Swab from Nasopharynx Updated: 01/24/25 1256     COVID19 Detected     Influenza A PCR Not Detected     Influenza B PCR Not Detected    Narrative:      Fact sheet for providers: https://www.fda.gov/media/157288/download    Fact sheet for patients: https://www.fda.gov/media/668078/download    Test performed by PCR.  Influenza A and Influenza B negative results should be considered presumptive in samples that have a positive SARS-CoV-2 result.    Competitive inhibition studies showed that SARS-CoV-2 virus, when present at concentrations above 3.6E+04 copies/mL, can inhibit the detection and amplification of influenza A and influenza B virus RNA if present at or below 1.8E+02 copies/mL or 4.9E+02 copies/mL, respectively, and may lead to false negative influenza virus results. If co-infection with influenza A or influenza B virus is suspected in samples with a positive SARS-CoV-2 result, the sample should be re-tested with another FDA cleared, approved, or authorized influenza test, if influenza virus detection would change clinical management.            Imaging  Results (All)       Procedure Component Value Units Date/Time    XR Chest 1 View [[id number]] Collected: 01/24/25 1221     Updated: 01/24/25 1225    Narrative:      XR CHEST 1 VW    Date of Exam: 1/24/2025 11:49 AM EST    Indication: Weak/Dizzy/AMS triage protocol    Comparison: Chest x-ray 8/29/2021    Findings:  The heart is stable in size. There is some left lower lobe airspace disease at the costophrenic angle and suspected underlying pleural effusion. No evidence for pneumothorax. Cardiac pacemaker is noted.      Impression:      Impression:  Some suspected left lower lobe airspace disease of the costophrenic angle with potential trace effusion.      Electronically Signed: Adriana Asencio MD    1/24/2025 12:22 PM EST    Workstation ID: [id number]                    Results for orders placed during the hospital encounter of 08/29/21    Adult Transthoracic Echo Complete W/ Cont if Necessary Per Protocol    Interpretation Summary  · Estimated left ventricular EF = 30-35%. Moderate to severe LV systolic dysfunction.  · Left ventricular wall thickness is consistent with mild concentric hypertrophy.  · Left ventricular diastolic function is consistent with (grade II w/high LAP) pseudonormalization.  · Severe aortic valve regurgitation is present.  · Moderate to severe mitral valve regurgitation is present.  · Mild tricuspid valve regurgitation is present  · Estimated right ventricular systolic pressure from tricuspid regurgitation is normal (<35 mmHg).        Discharge Details        Discharge Medications        Continue These Medications        Instructions Start Date   alendronate 70 MG tablet  Commonly known as: FOSAMAX   70 mg, Every 7 Days      Cholecalciferol 50 MCG (2000 UT) capsule   Take 1 capsule by mouth Daily.      cyanocobalamin 1000 MCG/ML injection   1,000 mcg, Every 28 Days      Eliquis 2.5 MG tablet tablet  Generic drug: apixaban   2.5 mg, Oral, 2 Times Daily      levothyroxine 88 MCG tablet  Commonly  known as: SYNTHROID, LEVOTHROID   88 mcg, Every Early Morning      metoprolol succinate XL 25 MG 24 hr tablet  Commonly known as: Toprol XL   25 mg, Oral, Daily      PRESERVISION AREDS 2+MULTI VIT PO   Take 1 tablet by mouth 2 (Two) Times a Day.             Stop These Medications      calcium carbonate 600 MG tablet  Commonly known as: OS-FE     furosemide 20 MG tablet  Commonly known as: LASIX     rosuvastatin 10 MG tablet  Commonly known as: CRESTOR     spironolactone 25 MG tablet  Commonly known as: ALDACTONE              Allergies   Allergen Reactions    Penicillins Anaphylaxis    Potassium-Containing Compounds Rash         Discharge Disposition:  Skilled Nursing Facility (DC - External)    Discharge Diet:  Diet Order   Procedures    Diet: Regular/House; Fluid Consistency: Thin (IDDSI 0)         Discharge Activity:   Activity Instructions       Activity as Tolerated      Up WIth Assist                CODE STATUS:    Code Status and Medical Interventions: CPR (Attempt to Resuscitate); Full Support   Ordered at: 01/24/25 1345     Level Of Support Discussed With:    Patient     Code Status (Patient has no pulse and is not breathing):    CPR (Attempt to Resuscitate)     Medical Interventions (Patient has pulse or is breathing):    Full Support         Future Appointments   Date Time Provider Department Center   2/3/2025 12:30 PM MED 11 [facility] None   4/7/2026  2:15 PM Daniel Ascencio MD [facility]       Additional Instructions for the Follow-ups that You Need to Schedule       Discharge Follow-up with PCP   As directed       Currently Documented PCP:    Criselda Padgett MD    PCP Phone Number:    [phone]     Follow Up Details: Follow up with PCP in 1 week                Time Spent on Discharge:  45 minutes    Electronically signed by NASREEN Nobles, 02/03/25, 9:45 AM EST.       Electronically signed by Marian Chopra APRN at 02/03/25 8410

## 2025-04-14 RX ORDER — APIXABAN 2.5 MG/1
2.5 TABLET, FILM COATED ORAL 2 TIMES DAILY
Qty: 60 TABLET | Refills: 5 | Status: SHIPPED | OUTPATIENT
Start: 2025-04-14